# Patient Record
Sex: MALE | Race: WHITE | Employment: OTHER | URBAN - METROPOLITAN AREA
[De-identification: names, ages, dates, MRNs, and addresses within clinical notes are randomized per-mention and may not be internally consistent; named-entity substitution may affect disease eponyms.]

---

## 2017-02-22 ENCOUNTER — ANESTHESIA EVENT (OUTPATIENT)
Dept: ENDOSCOPY | Age: 62
End: 2017-02-22
Payer: COMMERCIAL

## 2017-02-22 ENCOUNTER — ANESTHESIA (OUTPATIENT)
Dept: ENDOSCOPY | Age: 62
End: 2017-02-22
Payer: COMMERCIAL

## 2017-02-22 ENCOUNTER — SURGERY (OUTPATIENT)
Age: 62
End: 2017-02-22

## 2017-02-22 PROCEDURE — 74011250636 HC RX REV CODE- 250/636

## 2017-02-22 PROCEDURE — 74011000250 HC RX REV CODE- 250

## 2017-02-22 RX ORDER — PROPOFOL 10 MG/ML
INJECTION, EMULSION INTRAVENOUS AS NEEDED
Status: DISCONTINUED | OUTPATIENT
Start: 2017-02-22 | End: 2017-02-22 | Stop reason: HOSPADM

## 2017-02-22 RX ORDER — SODIUM CHLORIDE 9 MG/ML
INJECTION, SOLUTION INTRAVENOUS
Status: DISCONTINUED | OUTPATIENT
Start: 2017-02-22 | End: 2017-02-22 | Stop reason: HOSPADM

## 2017-02-22 RX ORDER — LIDOCAINE HYDROCHLORIDE 20 MG/ML
INJECTION, SOLUTION EPIDURAL; INFILTRATION; INTRACAUDAL; PERINEURAL AS NEEDED
Status: DISCONTINUED | OUTPATIENT
Start: 2017-02-22 | End: 2017-02-22 | Stop reason: HOSPADM

## 2017-02-22 RX ADMIN — SODIUM CHLORIDE: 9 INJECTION, SOLUTION INTRAVENOUS at 12:15

## 2017-02-22 RX ADMIN — PROPOFOL 30 MG: 10 INJECTION, EMULSION INTRAVENOUS at 12:42

## 2017-02-22 RX ADMIN — PROPOFOL 20 MG: 10 INJECTION, EMULSION INTRAVENOUS at 12:41

## 2017-02-22 RX ADMIN — PROPOFOL 50 MG: 10 INJECTION, EMULSION INTRAVENOUS at 12:34

## 2017-02-22 RX ADMIN — PROPOFOL 50 MG: 10 INJECTION, EMULSION INTRAVENOUS at 12:33

## 2017-02-22 RX ADMIN — PROPOFOL 20 MG: 10 INJECTION, EMULSION INTRAVENOUS at 12:45

## 2017-02-22 RX ADMIN — LIDOCAINE HYDROCHLORIDE 40 MG: 20 INJECTION, SOLUTION EPIDURAL; INFILTRATION; INTRACAUDAL; PERINEURAL at 12:32

## 2017-02-22 RX ADMIN — PROPOFOL 30 MG: 10 INJECTION, EMULSION INTRAVENOUS at 12:44

## 2017-02-22 RX ADMIN — PROPOFOL 30 MG: 10 INJECTION, EMULSION INTRAVENOUS at 12:38

## 2017-02-22 RX ADMIN — PROPOFOL 20 MG: 10 INJECTION, EMULSION INTRAVENOUS at 12:39

## 2017-02-22 RX ADMIN — PROPOFOL 50 MG: 10 INJECTION, EMULSION INTRAVENOUS at 12:35

## 2017-02-22 NOTE — ANESTHESIA POSTPROCEDURE EVALUATION
Post-Anesthesia Evaluation and Assessment    Patient: Karina Black MRN: [id number]  SSN: [id number]    YOB: 1955  Age: 57 y.o. Sex: male       Cardiovascular Function/Vital Signs  Visit Vitals    /83    Pulse 68    Temp 36.8 °C (98.2 °F)    Resp 21    Ht 5' 10\" (1.778 m)    Wt 97.1 kg (214 lb)    SpO2 97%    BMI 30.71 kg/m2       Patient is status post MAC anesthesia for Procedure(s):  EGD WITH HALO  ENDOSCOPIC HALO ABLATION. Nausea/Vomiting: None    Postoperative hydration reviewed and adequate. Pain:  Pain Scale 1: Numeric (0 - 10) (02/22/17 1319)  Pain Intensity 1: 0 (02/22/17 1319)   Managed    Neurological Status: At baseline    Mental Status and Level of Consciousness: Arousable    Pulmonary Status:   O2 Device: Room air (02/22/17 1319)   Adequate oxygenation and airway patent    Complications related to anesthesia: None    Post-anesthesia assessment completed.  No concerns    Signed By: Eun Mcmillan MD     February 22, 2017

## 2017-02-22 NOTE — ANESTHESIA PREPROCEDURE EVALUATION
Anesthetic History   No history of anesthetic complications            Review of Systems / Medical History  Patient summary reviewed, nursing notes reviewed and pertinent labs reviewed    Pulmonary  Within defined limits                 Neuro/Psych   Within defined limits           Cardiovascular    Hypertension          Past MI and CAD      Comments: IMPRESSION  1. Moderate to severe left ventricular diastolic dysfunction. 2. Mild to moderate elevation of right-sided filling pressures. 3. Mild to moderate pulmonary hypertension (at least in part related to  left-sided disease). 4. No signs of left-to-right shunting by oximetry run.  5. Normal cardiac output. GI/Hepatic/Renal     GERD      Liver disease    Comments: St's  Esophageal Varies  Hiatal Hernia  cirrhosis Endo/Other    Diabetes: type 2    Arthritis     Other Findings   Comments: Hepatitis C  Alcoholic Cirrhosis  History of Thombocyopenia  Liver function improved per patient and he is not on the liver tx list anymore.          Physical Exam    Airway  Mallampati: II  TM Distance: 4 - 6 cm  Neck ROM: normal range of motion   Mouth opening: Normal     Cardiovascular    Rhythm: regular  Rate: normal         Dental  No notable dental hx       Pulmonary  Breath sounds clear to auscultation               Abdominal  Abdominal exam normal       Other Findings            Anesthetic Plan    ASA: 3  Anesthesia type: MAC          Induction: Intravenous  Anesthetic plan and risks discussed with: Patient

## 2017-03-01 ENCOUNTER — HOSPITAL ENCOUNTER (OUTPATIENT)
Dept: LAB | Age: 62
Discharge: HOME | End: 2017-03-01
Payer: COMMERCIAL

## 2017-03-01 ENCOUNTER — OFFICE VISIT (OUTPATIENT)
Dept: HEMATOLOGY | Age: 62
End: 2017-03-01

## 2017-03-01 VITALS
RESPIRATION RATE: 16 BRPM | OXYGEN SATURATION: 99 % | HEIGHT: 70 IN | DIASTOLIC BLOOD PRESSURE: 85 MMHG | HEART RATE: 68 BPM | TEMPERATURE: 96.8 F | SYSTOLIC BLOOD PRESSURE: 140 MMHG | WEIGHT: 214 LBS | BODY MASS INDEX: 30.64 KG/M2

## 2017-03-01 DIAGNOSIS — B19.20 COMPENSATED HCV CIRRHOSIS (HCC): Primary | ICD-10-CM

## 2017-03-01 DIAGNOSIS — K74.69 COMPENSATED HCV CIRRHOSIS (HCC): ICD-10-CM

## 2017-03-01 DIAGNOSIS — K74.69 COMPENSATED HCV CIRRHOSIS (HCC): Primary | ICD-10-CM

## 2017-03-01 DIAGNOSIS — B19.20 COMPENSATED HCV CIRRHOSIS (HCC): ICD-10-CM

## 2017-03-01 LAB
ALBUMIN SERPL BCP-MCNC: 3.6 G/DL (ref 3.4–5)
ALBUMIN/GLOB SERPL: 1 {RATIO} (ref 0.8–1.7)
ALP SERPL-CCNC: 139 U/L (ref 45–117)
ALT SERPL-CCNC: 33 U/L (ref 16–61)
ANION GAP BLD CALC-SCNC: 12 MMOL/L (ref 3–18)
AST SERPL W P-5'-P-CCNC: 34 U/L (ref 15–37)
BASOPHILS # BLD AUTO: 0 K/UL (ref 0–0.06)
BASOPHILS # BLD: 1 % (ref 0–2)
BILIRUB DIRECT SERPL-MCNC: 0.2 MG/DL (ref 0–0.2)
BILIRUB SERPL-MCNC: 0.5 MG/DL (ref 0.2–1)
BUN SERPL-MCNC: 21 MG/DL (ref 7–18)
BUN/CREAT SERPL: 18 (ref 12–20)
CALCIUM SERPL-MCNC: 8.9 MG/DL (ref 8.5–10.1)
CHLORIDE SERPL-SCNC: 109 MMOL/L (ref 100–108)
CO2 SERPL-SCNC: 24 MMOL/L (ref 21–32)
CREAT SERPL-MCNC: 1.19 MG/DL (ref 0.6–1.3)
DIFFERENTIAL METHOD BLD: ABNORMAL
EOSINOPHIL # BLD: 0.3 K/UL (ref 0–0.4)
EOSINOPHIL NFR BLD: 10 % (ref 0–5)
ERYTHROCYTE [DISTWIDTH] IN BLOOD BY AUTOMATED COUNT: 14.8 % (ref 11.6–14.5)
GLOBULIN SER CALC-MCNC: 3.6 G/DL (ref 2–4)
GLUCOSE SERPL-MCNC: 93 MG/DL (ref 74–99)
HCT VFR BLD AUTO: 34.8 % (ref 36–48)
HGB BLD-MCNC: 10.9 G/DL (ref 13–16)
INR PPP: 1.3 (ref 0.8–1.2)
LYMPHOCYTES # BLD AUTO: 20 % (ref 21–52)
LYMPHOCYTES # BLD: 0.6 K/UL (ref 0.9–3.6)
MCH RBC QN AUTO: 26.8 PG (ref 24–34)
MCHC RBC AUTO-ENTMCNC: 31.3 G/DL (ref 31–37)
MCV RBC AUTO: 85.5 FL (ref 74–97)
MONOCYTES # BLD: 0.3 K/UL (ref 0.05–1.2)
MONOCYTES NFR BLD AUTO: 9 % (ref 3–10)
NEUTS SEG # BLD: 1.9 K/UL (ref 1.8–8)
NEUTS SEG NFR BLD AUTO: 60 % (ref 40–73)
PLATELET # BLD AUTO: 76 K/UL (ref 135–420)
PMV BLD AUTO: 12.1 FL (ref 9.2–11.8)
POTASSIUM SERPL-SCNC: 4.7 MMOL/L (ref 3.5–5.5)
PROT SERPL-MCNC: 7.2 G/DL (ref 6.4–8.2)
PROTHROMBIN TIME: 15.3 SEC (ref 11.5–15.2)
RBC # BLD AUTO: 4.07 M/UL (ref 4.7–5.5)
SODIUM SERPL-SCNC: 145 MMOL/L (ref 136–145)
WBC # BLD AUTO: 3.1 K/UL (ref 4.6–13.2)

## 2017-03-01 PROCEDURE — 36415 COLL VENOUS BLD VENIPUNCTURE: CPT | Performed by: NURSE PRACTITIONER

## 2017-03-01 PROCEDURE — 82107 ALPHA-FETOPROTEIN L3: CPT | Performed by: NURSE PRACTITIONER

## 2017-03-01 PROCEDURE — 80076 HEPATIC FUNCTION PANEL: CPT | Performed by: NURSE PRACTITIONER

## 2017-03-01 PROCEDURE — 85610 PROTHROMBIN TIME: CPT | Performed by: NURSE PRACTITIONER

## 2017-03-01 PROCEDURE — 85025 COMPLETE CBC W/AUTO DIFF WBC: CPT | Performed by: NURSE PRACTITIONER

## 2017-03-01 PROCEDURE — 80048 BASIC METABOLIC PNL TOTAL CA: CPT | Performed by: NURSE PRACTITIONER

## 2017-03-01 NOTE — MR AVS SNAPSHOT
Visit Information Date & Time Provider Department Dept. Phone Encounter #  
 3/1/2017 10:00 AM Negar [facility] NP [facility] of [address] [place] [address] [id number] Follow-up Instructions Return in about 3 months (around 6/1/2017), or if symptoms worsen or fail to improve. Upcoming Health Maintenance Date Due HEMOGLOBIN A1C Q6M 1955 FOOT EXAM Q1 12/28/1964 MICROALBUMIN Q1 12/28/1964 EYE EXAM RETINAL OR DILATED Q1 12/28/1964 Pneumococcal 19-64 Medium Risk (1 of 1 - PPSV23) 12/28/1973 DTaP/Tdap/Td series (1 - Tdap) 12/28/1975 FOBT Q 1 YEAR AGE 50-75 12/28/2004 LIPID PANEL Q1 3/28/2014 ZOSTER VACCINE AGE 60> 12/28/2014 INFLUENZA AGE 9 TO ADULT 8/1/2016 Allergies as of 3/1/2017  Review Complete On: 3/1/2017 By: Jeanette Ying NP No Known Allergies Current Immunizations  Never Reviewed No immunizations on file. Not reviewed this visit You Were Diagnosed With   
  
 Codes Comments Compensated HCV cirrhosis ([facility]    -  Primary ICD-10-CM: B19.20, E86.86 ICD-9-CM: 070.54, 571.5 Vitals BP  
  
  
  
  
  
 140/85 (BP 1 Location: Left arm, BP Patient Position: Sitting) Vitals History BMI and BSA Data Body Mass Index Body Surface Area 30.71 kg/m 2 2.19 m 2 Preferred Pharmacy Pharmacy Name Phone [id number] [place] [address] [phone] Your Updated Medication List  
  
   
This list is accurate as of: 3/1/17 10:26 AM.  Always use your most recent med list.  
  
  
  
  
 gabapentin 600 mg tablet Commonly known as:  NEURONTIN Take 600 mg by mouth as needed. Indications: NEUROPATHIC PAIN  
  
 glipiZIDE SR 10 mg CR tablet Commonly known as:  GLUCOTROL XL Take 10 mg by mouth daily. Indications: TYPE 2 DIABETES MELLITUS  
  
 LIPITOR 10 mg tablet Generic drug:  atorvastatin Take 10 mg by mouth every evening. metoprolol succinate 50 mg XL tablet Commonly known as:  TOPROL-XL Take 50 mg by mouth daily. Indications: HYPERTENSION  
  
 multivitamin tablet Commonly known as:  ONE A DAY Take 1 Tab by mouth daily. nitroglycerin 0.4 mg SL tablet Commonly known as:  NITROSTAT  
1 Tab by SubLINGual route every five (5) minutes as needed for Chest Pain. ONGLYZA 5 mg Tab tablet Generic drug:  sAXagliptin Take 5 mg by mouth daily. PRILOSEC PO Take 20 mg by mouth daily. Indications: GERD Follow-up Instructions Return in about 3 months (around 6/1/2017), or if symptoms worsen or fail to improve. To-Do List   
 03/01/2017 Lab:  AFP WITH AFP-L3%   
  
 03/01/2017 Lab:  CBC WITH AUTOMATED DIFF   
  
 03/01/2017 Lab:  HEPATIC FUNCTION PANEL   
  
 03/01/2017 Lab:  METABOLIC PANEL, BASIC   
  
 03/01/2017 Lab:  PROTHROMBIN TIME + INR   
  
 03/01/2017 Imaging:  [facility]! Dear Mamta Elizabeth: Thank you for requesting a [employer] account. Our records indicate that you already have an active [employer] account. You can access your account anytime at [email or web address] Did you know that you can access your hospital and ER discharge instructions at any time in [employer]? You can also review all of your test results from your hospital stay or ER visit. Additional Information If you have questions, please visit the Frequently Asked Questions section of the [employer] website at [email or web address] Remember, [employer] is NOT to be used for urgent needs. For medical emergencies, dial 911. Now available from your iPhone and Android! Please provide this summary of care documentation to your next provider. Your primary care clinician is listed as VENITA PARKS. If you have any questions after today's visit, please call [phone].

## 2017-03-01 NOTE — PROGRESS NOTES
[address] Dilia Vidal MD, Jose Jackson NP   [address], NP   Rishabh Sales NP        at [address], [address] [facility]     [address] [facility] [place] [facility].     [phone]     FAX: [phone]    at [address], [id number] [place] [address], [address]     [phone]     FAX: [phone]       Patient Care Team:  Hodan Bray MD as PCP - General (Family Practice)  Ksenia Jackson MD (Gastroenterology)  Chapincito Montiel MD (Cardiology)  [address], NP as Consulting Provider (Gastroenterology)  Kaykay Whitney MD as Consulting Provider (Orthopedic Surgery)        Problem List  Date Reviewed: 9/27/2016          Codes Class Noted    H/O alcohol abuse ICD-10-CM: Z87.898  ICD-9-CM: 305.03  Unknown    Overview Signed 4/27/2016  6:37 PM by Nathaly Polanco V     quit few months ago, particpates in [place] [address]             H/O tobacco use, presenting hazards to health ICD-10-CM: Z87.891  ICD-9-CM: V15.82  Unknown    Overview Signed 4/27/2016  6:37 PM by Nathaly Polanco V     quit few months ago             CAD (coronary artery disease), native coronary artery ICD-10-CM: I25.10  ICD-9-CM: 414.01  Unknown    Overview Addendum 1/3/2014  3:55 PM by Sahil Polanco V     p/mLAD 60% ((FFR 0.84). Otherwise mild coronary artery disease (march 2013).  LV EF 65% (echo feb 2013)             Hepatic encephalopathy (HCC) ICD-10-CM: K72.90  ICD-9-CM: 572.2  2/26/2013        CKD (chronic kidney disease) ICD-10-CM: N18.9  ICD-9-CM: 585.9  2/26/2013        Esophageal varices ([facility] ICD-10-CM: I85.00  ICD-9-CM: 456.1  2/26/2013        Cirrhosis ([facility] ICD-10-CM: K74.60  ICD-9-CM: 571.5  2/2/2013    Overview Signed 2/2/2013  5:22 PM by Lance Hartmann MD     Secondary to chronic HCV             Ascites ICD-10-CM: R18.8  ICD-9-CM: 789.59  2/2/2013        Hypertension ICD-10-CM: I10  ICD-9-CM: 401.9  5/23/2011        Ventral hernia, unspecified, without mention of obstruction or gangrene ICD-10-CM: K43.9  ICD-9-CM: 553.20  5/23/2011        Hyperlipidemia ICD-10-CM: E78.5  ICD-9-CM: 272.4  5/23/2011        Type II or unspecified type diabetes mellitus without mention of complication, not stated as uncontrolled ICD-10-CM: E11.9  ICD-9-CM: 250.00  5/23/2011        GERD (gastroesophageal reflux disease) ICD-10-CM: K21.9  ICD-9-CM: 530.81  5/23/2011        St's esophagus ICD-10-CM: K22.70  ICD-9-CM: 530.85  5/23/2011        Chronic hepatitis C ([facility] ICD-10-CM: B18.2  ICD-9-CM: 070.54  5/23/2011    Overview Signed 2/2/2013  5:24 PM by Jean Kitchen MD     Genotype 1  Peginterferon/ribavirin non-response as part of HALT-C clinical trial.    Peginterferon maintenance therapy during HALT-C clinical trial.   Conatus non-responder study. Discontinued secondary to joint pain. Peginterferon/Riba/Boceprevir. Treated for 12 weeks with significant anemia and thrombocytopenia. Non-responder. Thrombocytopenia, secondary to cirrhosis ICD-10-CM: D69.59  ICD-9-CM: 287.49  5/23/2011              Ottoniel Beard returns to the [place] [address] for monitoring of cirrhosis secondary to chronic HCV. The active problem list, all pertinent past medical history, medications, liver histology, endoscopic studies, radiologic findings and laboratory findings related to the liver disorder were reviewed with the patient. Ascites resolved. The patient has not developed hepatic encephalopathy. The patient has esophageal varices without bleeding. The patient has began, but has not completed liver transplant evaluation testing. The Thallium stress test demonstrated a fixed and 2 small reversible lesions. Cardiac catheterization did not indicate the need for stenting. Mr. Elva Rebollar was noted to have a nodule on his thyroid while being worked up for a pleural effusion. He had a biopsy that was negative for malignancy. We have discussed the liver transplant process and various centers he could go to for liver transplantation. He and his wife would like to be seen at [place], [place] if this becomes neccessary. Mr. Casey Arevalo completed 12 weeks of treatment with sofosbuvir and simeprevir in June 2014. He had a SVR one year post treatment. Since Mr. Vincent Carroll last office visit he has been feeling well. He denies any further alcohol use since January 2016. He states that he continues to participate in [place]. Dr. Kenyon Stockton has performed two HALO procedures for St esophagus since his last office visit. No varices were identified during these procedures. Mr. Casey Arevalo is overall feeling well since his last office visit. His lower extremity edema has completely resolved and he is currently not taking any diuretics. He deneis any shortness of breathe. He continues to struggle with arthralgias due to osteoarthritis. He does have difficulty completing daily activities due to arthritis. The patient has not experienced problems concentrating, swelling of the abdomen, swelling of the lower extremities, hematemesis, hematochezia. ALLERGIES:  No Known Allergies    Current Outpatient Prescriptions   Medication Sig    atorvastatin (LIPITOR) 10 mg tablet Take 10 mg by mouth every evening.  nitroglycerin (NITROSTAT) 0.4 mg SL tablet 1 Tab by SubLINGual route every five (5) minutes as needed for Chest Pain.  metoprolol succinate (TOPROL-XL) 50 mg XL tablet Take 50 mg by mouth daily. Indications: HYPERTENSION    gabapentin (NEURONTIN) 600 mg tablet Take 600 mg by mouth as needed. Indications: NEUROPATHIC PAIN    glipiZIDE SR (GLUCOTROL) 10 mg CR tablet Take 10 mg by mouth daily. Indications: TYPE 2 DIABETES MELLITUS    saxagliptin (ONGLYZA) 5 mg Tab tablet Take 5 mg by mouth daily.  multivitamin (ONE A DAY) tablet Take 1 Tab by mouth daily.       OMEPRAZOLE (PRILOSEC PO) Take 20 mg by mouth daily. Indications: GERD     No current facility-administered medications for this visit. SYSTEM REVIEW NOT RELATED TO LIVER DISEASE OR REVIEWED ABOVE:  Constitution systems: Negative for fever, chills, weight gain, weight loss. Eyes: Negative for visual changes. ENT: Negative for sore throat, painful swallowing. Respiratory: Negative for cough, hemoptysis, SOB. Cardiology: Negative for chest pain, palpitations. GI:  Negative for constipation or diarrhea. : Negative for urinary frequency, dysuria, hematuria, nocturia. Skin: Negative for rash. Hematology: Positive for easy bruising. Negative for blood clots. Musculo-skelatal: Positive for weakness. Neurologic: Negative for headaches, dizziness, vertigo, memory problems not related to HE. Psychology: Negative for anxiety, depression. FAMILY/SOCIAL HISTORY:  The patient is . The patient has 2 children. Stooped smoking 1/2016. History of heavy alcohol use. Last alcohol consumption was 1/2016. The patient retired in 2010. PHYSICAL EXAMINATION:    Visit Vitals    /85 (BP 1 Location: Left arm, BP Patient Position: Sitting)    Pulse 68    Temp 96.8 °F (36 °C) (Tympanic)    Resp 16    Ht 5' 10\" (1.778 m)    Wt 214 lb (97.1 kg)    SpO2 99%    BMI 30.71 kg/m2       PHYSICAL EXAMINATION:  VS: per nursing note  General: No acute distress. Eyes: Sclera anicteric. ENT: No oral lesions. Thyroid normal.  Nodes: No adenopathy. Skin: No spider angiomata. No jaundice. No palmar erythema. Respiratory: Lungs clear to auscultation. Cardiovascular: Regular heart rate. No murmurs. No JVD. Abdomen: Soft non-tender, liver size normal to percussion/palpation. Spleen not palpable. No obvious ascites. Extremities: No edema. No muscle wasting. No gross arthritic changes. Neurologic: Alert and oriented. Cranial nerves grossly intact. No asterixis.       [facility] [facility] [facility] Units 8/29/2016 3/10/2016 12/1/2015   WBC 4.6 - 13.2 K/uL 2.3 (L) 2.4 (L) 2.3 (L)   ANC 1.8 - 8.0 K/UL 1.4 (L) 1.3 (L) 1.3 (L)   HGB 13.0 - 16.0 g/dL 9.5 (L) 10.0 (L) 9.5 (L)    - 420 K/uL 63 (L) 55 (L) 42 (L)   INR 0.8 - 1.2   1.3 (H) 1.4 (H) 1.4 (H)   AST 15 - 37 U/L 38 (H) 31 55 (H)   ALT 16 - 61 U/L 20 20 34   Alk Phos 45 - 117 U/L 181 (H) 127 (H) 243 (H)   Bili, Total 0.2 - 1.0 MG/DL 0.7 0.8 0.8   Bili, Direct 0.0 - 0.2 MG/DL 0.2 0.5 (H) 0.5 (H)   Albumin 3.4 - 5.0 g/dL 3.7 2.3 (L) 2.6 (L)   BUN 7.0 - 18 MG/DL 19 (H) 20 (H) 19 (H)   Creat 0.6 - 1.3 MG/DL 1.19 1.41 (H) 1.27   Na 136 - 145 mmol/L 143 141 145   K 3.5 - 5.5 mmol/L 4.2 4.7 5.0   Cl 100 - 108 mmol/L 109 (H) 110 (H) 110 (H)   CO2 21 - 32 mmol/L 25 25 26   Glucose 74 - 99 mg/dL 91 165 (H) 118 (H)   Ammonia 27 - 102 ug/dL        Liver [facility] Latest Ref Rng & Units 8/31/2015 5/20/2015   WBC 4.6 - 13.2 K/uL 2.9 (L) 3.0 (L)   ANC 1.8 - 8.0 K/UL 1.8 1.6   HGB 13.0 - 16.0 g/dL 9.0 (L) 10.9 (L)    - 420 K/uL 59 (LL) 33 (LL)   INR 0.8 - 1.2   1.3 (H) 1.3 (H)   AST 15 - 37 U/L 65 (H) 76 (H)   ALT 16 - 61 U/L 32 32   Alk Phos 45 - 117 U/L 149 (H) 159 (H)   Bili, Total 0.2 - 1.0 MG/DL 0.9 1.3 (H)   Bili, Direct 0.0 - 0.2 MG/DL 0.50 (H) 0.63 (H)   Albumin 3.4 - 5.0 g/dL 3.3 (L) 3.2 (L)   BUN 7.0 - 18 MG/DL 25 24   Creat 0.6 - 1.3 MG/DL 1.36 (H) 1.26   Na 136 - 145 mmol/L 135 136   K 3.5 - 5.5 mmol/L 5.2 5.7 (H)   Cl 100 - 108 mmol/L 100 101   CO2 21 - 32 mmol/L 18 20   Glucose 74 - 99 mg/dL 79 84   Ammonia 27 - 102 ug/dL       SEROLOGY  2/2012. Anti-HCV negative, CMV IgG positive, EBV IgG positive,     LIVER HISTOLOGY  None available. ENDOSCOPIC PROCEDURES  11/2009:  EGD. Reported to demonstrate varices and Sts esophagus. 12/2010:  EGD. Grade I-II esophageal varices. 12/2010:  Colonoscopy - unremarkable. 3/2013. EGD by Dr Carmel Jo. Report requested. 05/27/2014:  EGD per MLS. Multiple medium esophageal varices. Six bands placed. Barrets esophagus (bx negative for malignancy). Repeat in 6 weeks. 09/2014: EGD per MLS. Small esophageal varices. Mild portal hypertensive gastropathy. No gastric varices. Polyp biopsied in the duodenal bulb found to be carcinoid by pathology. Large segment of Barretts esophagus. Biopsy technologically difficult due to bleeding. 02/2015:  EGD per Dr. Annika Jensen. Esophageal varices. Seven bands placed. Portal hypertensive gastropathy. St esophagus. 3/2015: EGD per Dr. Annika Jensen. St esophagus. Portal hypertensive gastropathy. Varices. 4/2015: EGD per Dr. Annika Jensen. Esophageal varices. Seven bands placed. Portal hypertensive gastropathy. St esophagus. Will continue EGDs every four months. Can not perform HALO procedure until varices completley obliterated. 9/2015:  EGD per Dr. Annika Jensen. Grade 1 varices. Four bands applied. Repeat in 6 months. If varices obliterated will perform Halo. 9/2016:  EGD per Dr. Mildred Rascon. Small varices in mid esophagus. Distal esophagus with several columns of St's as previously noted. Banding of this mucosa was attempted but bands would not apply. The mucosa was friable and started to bleed actively. 1:10:000 epinephrine was injected (5 ml) at various bleeding sites. This did not control the bleeding. Gold probe cautery was then applies to the bleeding sites and hemostasis was achieved. 11/2016:  EGD with HALO per Dr. Javier Womack. [place] colored mucosa was seen at 34 cm and extended upto 38 cm. No nodules or ulceration was seen. Scarring from previous band ligation of varices was seen. No varices were seen. Small sliding hiatal hernia was seen. Stomach: Streaky erythema was seen in antrum and was radiating towards the pylorus. No gastric varices were seen Duodenum: normal  2/2017:  EGD with HALO per Dr. Javier Womack. Repeat in 3 months.     RADIOLOGY  1/2009:  Ultrasound liver - echogenic consistent with cirrhosis, no liver mass lesions, no ascites. 11/2010:  Abdominal ultrasound - no focal liver lesions noted, no ascites. 03/2012:  Ultrasound of the liver. Echogenic consistent with chronic liver disease, cirrhosis. No liver mass lesions. No ascites. 09/2012: Ultrasound of the liver. Echogenic consistent with chronic liver disease, cirrhosis. No liver mass lesions. No ascites. 2/12:  Chest x-ray PA and Lateral:  Normal heart size without consolidation. Band of atelectasis or scarring noted in the retrosternal region. There is blunting on the right CP angle due to small effusion. 2/2013:  Ultrasound of liver. Echogenic consistent with cirrhosis. 2.5 x 1.7 lesion in left lobe. Appears larger than on previous US. No dilated bile ducts. No ascites. 2/2013. MRI abdomen. No contrast given. Changes consistent with cirrhosis. No liver mass lesions. No dilated bile ducts. No bile duct strictures. No ascites. 03/2013 - Triple phase CT of abdomen. Hypervascular mass in left lobe stable and consistent with hemangioma. Stable non enhancing mass in segment 4/5. Large right pleural effusion. 08/2013: Ultrasound of the liver. Echogenic consistent with cirrhosis. No ascites. No bile duct strictures. 4.5 x 1.9 x 2.1 hyperechoic circumscribed structure similar to to CT which indicated hemangioma. 02/2014: The previously documented hemangioma within the subcapsular left hepatic lobe  is not identified on this exam. Hyperechoic mass within segment V/IV is identified, shows some interval increase since comparison ultrasound (maximum diameter currently 3.2 cm, previously 2.5 cm) but prior MRI suggests this represents a regenerative nodule. Second hyperechoic masslike area is now seen at subcapsular anterior right hepatic lobe measuring 3 cm maximally. This lesion is not clearly identified on prior ultrasound or MRI. Regenerative nodule is certainly possible but the finding is nonspecific.  04/2014:  Abdominal MRI.  2 cm lesion in the right hepatic lobe demonstrating faint arterial enhancement and early washout, concerning for hepatocellular carcinoma. 10/2014: Dynamic CT of the abdomen. Cirrhosis of the liver and evidence for portal hypertension with hepatomegaly and varices. Probable hemangioma although now with slightly atypical appearance in the left lobe of the liver lateral segment is not significantly changed in the interval in size as far back as 2011. This is an atypical appearance and behavior for hepatocellular carcinoma. 3. Probable focal fibrosis or regenerative nodule in the interlobar fissure. 3/2015: Ultrasound of the liver. A relatively discrete nodule is present in the right lobe measuring 3.2 x 2.2 x 2.2 cm. This is visible in retrospect on the prior study of 2014 February and is not hypervascular or well-defined on the following CT and MR studies. 11/2015: Abdominal ultrasound: Nodule in right hepatic lobe unchanged from previous imaging. No new lesions. No ascites. 01/2016: Ultrasound of liver. Echogenic consistent with cirrhosis. No liver mass lesions. No dilated bile ducts. No ascites. 9/2016: Ultrasound of the liver. Heterogeneous echotexture and lobulated contour of the liver, consistent with reported cirrhosis. Echogenic right hepatic mass is grossly stable given slight differences in  technique/positioning. No new hepatic mass was seen. Additional previously seen left hepatic mass is  not visualized on today's exam.     LIVER TRANSPLANT EVALUATION TESTING  2/2013. Blood ETOH positive. 2/2013. TSH 2.8, T3 112, T4 free 2.0  2/103. PSA 0.5  2/103. TB Quantiferon negative  2/2013. Blood type O+  2/2013. ECHO. Normal wall motion. LVEF 65%. No valve abnormalities. Trivial TR.  2/2013. FEV1 108% of predicted, FEV1% 103% of predicted, DLCO **% of predicted. 2/2013. Lexicon stress test.  LVEF 74%. Fixed inferior defect with normal wall motion.  Small reversible defect at apex and in distal inferolateral apical wall.  02/2013:  DEXA scan - Osteopenia. ASSESSMENT AND PLAN:    1. Cirrhosis secondary to chronic HCV and ETOH abuse. The liver function is depressed. The platelet count is depressed. CBC, BMP, hepatic panel, PT/INR ordered today. 2. Chronic HCV, genotype 1. He completed twelve weeks of treatment with simeprevir and sofosbuvir in June 2014. He had a SVR one year post treatment. 3. Peripheral edema. Resolved. 4. Esophageal varicies without prior bleeding. He has had repeat EGDs with HALO procedure performed. No varices identified. 5. Hepatic encephalopathy has not developed to date. There is no need for treatment with lactulose and/or xifaxan at this time. 6. ETOH abuse. Continue AA. Will repeat ETOH at every office visit. ETOH level ordered today. 7. The patient had a postive thalium stress test with reversible ischemia. He has a cardiac catheterization. No significant ASCAD identified. 8.  Back pain. Improved. 9. Thrombocytopenia secondary to cirrhosis. There is no evidence of overt bleeding. There is no indication for platelet transfusions or pharmacologic treatment to increase the platelet count. 10. [address] screening. AFP and abdominal ultrasound ordered today. 11. All of the above issues were discussed with the patient. All questions were answered. The patient expressed a clear understanding of the above. [address] in 12 weeks. 15. Dr. Benja Story was available during this visit.        Negar Kumar NP  [facility] of [address] [place] [address], [address] [place] [address]   [phone]

## 2017-03-03 LAB
AFP L3 MFR SERPL: 8.4 % (ref 0–9.9)
AFP SERPL-MCNC: 10.5 NG/ML (ref 0–8)

## 2017-03-28 ENCOUNTER — TELEPHONE (OUTPATIENT)
Dept: HEMATOLOGY | Age: 62
End: 2017-03-28

## 2017-03-28 NOTE — TELEPHONE ENCOUNTER
Patient would like for you to give him a call. He have some questions about a Ultrasound that he is suppose to be having at [place].

## 2017-04-04 ENCOUNTER — HOSPITAL ENCOUNTER (OUTPATIENT)
Dept: ULTRASOUND IMAGING | Age: 62
Discharge: HOME | End: 2017-04-04
Attending: NURSE PRACTITIONER
Payer: COMMERCIAL

## 2017-04-04 DIAGNOSIS — B19.20 COMPENSATED HCV CIRRHOSIS (HCC): ICD-10-CM

## 2017-04-04 DIAGNOSIS — K74.69 COMPENSATED HCV CIRRHOSIS (HCC): ICD-10-CM

## 2017-04-04 PROCEDURE — 76705 ECHO EXAM OF ABDOMEN: CPT

## 2017-04-19 ENCOUNTER — OFFICE VISIT (OUTPATIENT)
Dept: CARDIOLOGY CLINIC | Age: 62
End: 2017-04-19

## 2017-04-19 VITALS
DIASTOLIC BLOOD PRESSURE: 80 MMHG | BODY MASS INDEX: 31.92 KG/M2 | HEART RATE: 62 BPM | HEIGHT: 70 IN | OXYGEN SATURATION: 98 % | SYSTOLIC BLOOD PRESSURE: 138 MMHG | WEIGHT: 223 LBS

## 2017-04-19 DIAGNOSIS — D69.6 THROMBOCYTOPENIA (HCC): ICD-10-CM

## 2017-04-19 DIAGNOSIS — I10 ESSENTIAL HYPERTENSION: ICD-10-CM

## 2017-04-19 DIAGNOSIS — I25.10 CORONARY ARTERY DISEASE INVOLVING NATIVE CORONARY ARTERY OF NATIVE HEART WITHOUT ANGINA PECTORIS: Primary | ICD-10-CM

## 2017-04-19 DIAGNOSIS — D72.819 LEUKOPENIA, UNSPECIFIED TYPE: ICD-10-CM

## 2017-04-19 DIAGNOSIS — E78.5 HYPERLIPIDEMIA, UNSPECIFIED HYPERLIPIDEMIA TYPE: ICD-10-CM

## 2017-04-19 RX ORDER — TERAZOSIN 5 MG/1
5 CAPSULE ORAL DAILY
COMMUNITY
Start: 2017-04-14

## 2017-04-19 NOTE — PROGRESS NOTES
1. Have you been to the ER, urgent care clinic since your last visit? Hospitalized since your last visit? No     2. Have you seen or consulted any other health care providers outside of the [facility] since your last visit? Include any pap smears or colon screening.  No

## 2017-04-24 RX ORDER — ATORVASTATIN CALCIUM 10 MG/1
TABLET, FILM COATED ORAL
Qty: 90 TAB | Refills: 2 | Status: SHIPPED | OUTPATIENT
Start: 2017-04-24 | End: 2017-05-18

## 2017-04-24 NOTE — PROGRESS NOTES
PATIENT NAME: Valencia Claude         57 y.o.      1955              DATE:4/19/2017    REASON FOR VISIT: Coronary artery disease    HISTORY OF PRESENT ILLNESS: 60% LAD lesion seen on cath in January 2014. Left ventricular ejection fraction was 65%. The patient has a history of hyperlipidemia. He also has a history of cirrhosis of the liver and has questions about the safety of taking a statin in the setting of liver disease. His lipids have apparently been well controlled. He is hypertensive. Blood pressures have been well controlled. Ideally he should be on low-dose aspirin. He has a history of thrombocytopenia and leukopenia and wonders about the safety of taking aspirin. Denies chest pain and other symptoms suggestive of angina. Denies MOLINA, PND, orthopnea. Denies palpitation, syncope, presyncope. Denies edema in the lower extremities. PAST MEDICAL HISTORY:   Past Medical History:  5/23/2011: St's esophagus  No date: CAD (coronary artery disease), native coronary*      Comment: p/mLAD 60% ((FFR 0.84). Otherwise mild                coronary artery disease. 5/23/2011: Chronic hepatitis C without mention of hepatic*  No date: Chronic pain      Comment: back  5/23/2011: GERD (gastroesophageal reflux disease)  No date: H/O alcohol abuse      Comment: quit few months ago, particpates in AA  No date: H/O tobacco use, presenting hazards to health      Comment: quit few months ago  02/19/2013: History of echocardiogram      Comment: EF 65%. No reg'l WMA. RVSP 25 mmHg.    02/19/2013: History of myocardial perfusion scan      Comment: Fixed inferior defect, likely artifact. Sm,                mild reversible apical defect concerning for                mild ischemia; could be apical thinning. Sm                reversible distal inferolateral apical wall. Sm distal inferolateral, apical, & inferoapical               defect possibly ischemia. EF 74%. No WMA. Neg EKG on pharm stress test.  5/23/2011: Hyperlipidemia  20yrs+- 1990's: Hypertension  No date: Liver disease      Comment: chronic hep c   -non detectable since 2013  No date: Liver disease      Comment: cirrhosis  No date: OA (osteoarthritis)  No date: Other ill-defined conditions      Comment: triglycerides  03/28/2013: S/P cardiac cath      Comment: p/mLAD 60% ((FFR 0.84). Otherwise mild                coronary artery disease. RA 9.  RV 48/14. PA                50/20. W 25. COCI:  6.5/3.2 (TD) and 6.8/3.2                (Ingrid). 5/23/2011: Thrombocytopenia, secondary to cirrhosis  2007: Type II or unspecified type diabetes mellitus *  5/23/2011: Ventral hernia, unspecified, without mention o*    PAST SURGICAL HISTORY:   Past Surgical History:  No date: HX CATARACT REMOVAL      Comment: bilateral  No date: HX COLECTOMY      Comment: diverticulitis  No date: HX COLOSTOMY      Comment: then reversable  December 2010: HX GI      Comment: endoscopy  No date: HX GI      Comment: bowel resection - one surgery as of 3/11/2015  3/28/2013: HX HEART CATHETERIZATION      Comment: no stents  No date: HX HERNIA REPAIR      Comment: 3x ventral hernia, 1 testicular  1985: HX ORTHOPAEDIC      Comment: right wrist fracture (old injury) - and right                carpal tunnel  08/2016: HX ORTHOPAEDIC Right      Comment: wrist surgery - fusion and second carpal                tunnel      SOCIAL HISTORY:  Social History    Marital status:              Spouse name:                       Years of education:                 Number of children:               Social History Main Topics    Smoking status: Former Smoker                                                                Packs/day: 0.50      Years: 25.00          Quit date: 1/19/2016    Smokeless status: Never Used                        Alcohol use: No              Drug use:  No              Sexual activity: Yes               Partners with: Female        ALLERGIES:   No Known Allergies     CURRENT MEDICATIONS:   Current Outpatient Prescriptions:  terazosin (HYTRIN) 5 mg capsule,   atorvastatin (LIPITOR) 10 mg tablet, Take 10 mg by mouth every evening. metoprolol succinate (TOPROL-XL) 50 mg XL tablet, Take 50 mg by mouth daily. Indications: HYPERTENSION  glipiZIDE SR (GLUCOTROL) 10 mg CR tablet, Take 10 mg by mouth two (2) times a day. Indications: type 2 diabetes mellitus  saxagliptin (ONGLYZA) 5 mg Tab tablet, Take 5 mg by mouth daily. multivitamin (ONE A DAY) tablet, Take 1 Tab by mouth daily. OMEPRAZOLE (PRILOSEC PO), Take 20 mg by mouth daily. Indications: GERD  nitroglycerin (NITROSTAT) 0.4 mg SL tablet, 1 Tab by SubLINGual route every five (5) minutes as needed for Chest Pain.  gabapentin (NEURONTIN) 600 mg tablet, Take 600 mg by mouth as needed. Indications: NEUROPATHIC PAIN    No current facility-administered medications for this visit. REVIEW of SYSTEMS:History obtained from chart review and the patient  General ROS: negative for - weight gain or weight loss  Hematological and Lymphatic ROS: negative for - bleeding problems  Respiratory ROS: no cough, shortness of breath, or wheezing  Cardiovascular ROS: Please see history of present illness  Gastrointestinal ROS: no abdominal pain, change in bowel habits, or black or bloody stools  Musculoskeletal ROS: negative     PHYSICAL EXAMINATION:   /80  Pulse 62  Ht 5' 10\" (1.778 m)  Wt 101.2 kg (223 lb)  SpO2 98%  BMI 32 kg/m2  BP Readings from Last 3 Encounters:  04/19/17 : 138/80  03/01/17 : 140/85  02/22/17 : 142/83    Pulse Readings from Last 3 Encounters:  04/19/17 : 62  03/01/17 : 68  02/22/17 : 68    Wt Readings from Last 3 Encounters:  04/19/17 : 101.2 kg (223 lb)  03/01/17 : 97.1 kg (214 lb)  02/22/17 : 97.1 kg (214 lb)    General: Well-developed white male no apparent distress. HEENT: Sclera clear. Mucous membranes pink and moist.  Neck: No jugular venous distention. Carotid upstrokes 2+ without bruits. Chest: Clear to percussion and auscultation. Heart: PMI not palpable. Regular rhythm. No murmur or gallop. Abdomen: Nontender without masses or organomegaly. Extremities: No edema. Dorsalis pedis and posterior tibial pulses 2+. Skin: Warm and dry. No stasis changes. Neuro: Alert, oriented, speech WNL, no facial asymmetry. Gait WNL. EKG: Within normal limits    IMPRESSION:   Coronary artery disease, nonobstructive as of cath in January 2014. Asymptomatic  Hypertension, controlled  Hyperlipidemia, controlled  History of thrombocytopenia  History of cirrhosis of the liver    PLAN:  I have discussed statins with the patient. These are not contraindicated in patients with cirrhosis of the liver  As far as the low dose aspirin in the setting of thrombocytopenia is concerned, I have referred him to his hematologist oncologist regarding this question. My understanding is that aspirin is not contraindicated in this situation but I defer to the hematologist  No additional cardiac testing indicated at this time. No changes recommended    The diagnoses and plan were discussed with patient. All questions answered. Plan of care agreed to by all concerned. Rama Coleman MD       ,

## 2017-05-18 RX ORDER — LISINOPRIL 5 MG/1
20 TABLET ORAL DAILY
COMMUNITY
End: 2020-03-02 | Stop reason: DRUGHIGH

## 2017-05-19 ENCOUNTER — ANESTHESIA EVENT (OUTPATIENT)
Dept: ENDOSCOPY | Age: 62
End: 2017-05-19
Payer: COMMERCIAL

## 2017-05-19 ENCOUNTER — ANESTHESIA (OUTPATIENT)
Dept: ENDOSCOPY | Age: 62
End: 2017-05-19
Payer: COMMERCIAL

## 2017-05-19 ENCOUNTER — HOSPITAL ENCOUNTER (OUTPATIENT)
Age: 62
Setting detail: OUTPATIENT SURGERY
Discharge: HOME | End: 2017-05-19
Attending: INTERNAL MEDICINE | Admitting: INTERNAL MEDICINE
Payer: COMMERCIAL

## 2017-05-19 VITALS
HEART RATE: 62 BPM | BODY MASS INDEX: 30.49 KG/M2 | TEMPERATURE: 97.6 F | OXYGEN SATURATION: 100 % | SYSTOLIC BLOOD PRESSURE: 170 MMHG | WEIGHT: 213 LBS | HEIGHT: 70 IN | RESPIRATION RATE: 22 BRPM | DIASTOLIC BLOOD PRESSURE: 85 MMHG

## 2017-05-19 PROCEDURE — 77030036721 HC CATH BLN ABLAT ESOPH BARRX COVD -H1: Performed by: INTERNAL MEDICINE

## 2017-05-19 PROCEDURE — 77030025937 HC CAP SEAL HOLO BRMX -B: Performed by: INTERNAL MEDICINE

## 2017-05-19 PROCEDURE — 76040000019: Performed by: INTERNAL MEDICINE

## 2017-05-19 PROCEDURE — 74011250636 HC RX REV CODE- 250/636

## 2017-05-19 PROCEDURE — 74011000250 HC RX REV CODE- 250

## 2017-05-19 PROCEDURE — 76060000031 HC ANESTHESIA FIRST 0.5 HR: Performed by: INTERNAL MEDICINE

## 2017-05-19 RX ORDER — ATROPINE SULFATE 0.1 MG/ML
0.5 INJECTION INTRAVENOUS
Status: DISCONTINUED | OUTPATIENT
Start: 2017-05-19 | End: 2017-05-19 | Stop reason: HOSPADM

## 2017-05-19 RX ORDER — EPINEPHRINE 0.1 MG/ML
1 INJECTION INTRACARDIAC; INTRAVENOUS
Status: DISCONTINUED | OUTPATIENT
Start: 2017-05-19 | End: 2017-05-19 | Stop reason: HOSPADM

## 2017-05-19 RX ORDER — DEXTROMETHORPHAN/PSEUDOEPHED 2.5-7.5/.8
1.2 DROPS ORAL
Status: DISCONTINUED | OUTPATIENT
Start: 2017-05-19 | End: 2017-05-19 | Stop reason: HOSPADM

## 2017-05-19 RX ORDER — MIDAZOLAM HYDROCHLORIDE 1 MG/ML
.25-1 INJECTION, SOLUTION INTRAMUSCULAR; INTRAVENOUS
Status: DISCONTINUED | OUTPATIENT
Start: 2017-05-19 | End: 2017-05-19 | Stop reason: HOSPADM

## 2017-05-19 RX ORDER — PROPOFOL 10 MG/ML
INJECTION, EMULSION INTRAVENOUS AS NEEDED
Status: DISCONTINUED | OUTPATIENT
Start: 2017-05-19 | End: 2017-05-19 | Stop reason: HOSPADM

## 2017-05-19 RX ORDER — FENTANYL CITRATE 50 UG/ML
100 INJECTION, SOLUTION INTRAMUSCULAR; INTRAVENOUS
Status: DISCONTINUED | OUTPATIENT
Start: 2017-05-19 | End: 2017-05-19 | Stop reason: HOSPADM

## 2017-05-19 RX ORDER — SODIUM CHLORIDE 0.9 % (FLUSH) 0.9 %
5-10 SYRINGE (ML) INJECTION AS NEEDED
Status: DISCONTINUED | OUTPATIENT
Start: 2017-05-19 | End: 2017-05-19 | Stop reason: HOSPADM

## 2017-05-19 RX ORDER — SODIUM CHLORIDE 9 MG/ML
INJECTION, SOLUTION INTRAVENOUS
Status: DISCONTINUED | OUTPATIENT
Start: 2017-05-19 | End: 2017-05-19 | Stop reason: HOSPADM

## 2017-05-19 RX ORDER — SODIUM CHLORIDE 0.9 % (FLUSH) 0.9 %
5-10 SYRINGE (ML) INJECTION EVERY 8 HOURS
Status: DISCONTINUED | OUTPATIENT
Start: 2017-05-19 | End: 2017-05-19 | Stop reason: HOSPADM

## 2017-05-19 RX ORDER — SODIUM CHLORIDE 9 MG/ML
50 INJECTION, SOLUTION INTRAVENOUS CONTINUOUS
Status: DISCONTINUED | OUTPATIENT
Start: 2017-05-19 | End: 2017-05-19 | Stop reason: HOSPADM

## 2017-05-19 RX ORDER — LIDOCAINE HYDROCHLORIDE 20 MG/ML
INJECTION, SOLUTION EPIDURAL; INFILTRATION; INTRACAUDAL; PERINEURAL AS NEEDED
Status: DISCONTINUED | OUTPATIENT
Start: 2017-05-19 | End: 2017-05-19 | Stop reason: HOSPADM

## 2017-05-19 RX ORDER — NALOXONE HYDROCHLORIDE 0.4 MG/ML
0.4 INJECTION, SOLUTION INTRAMUSCULAR; INTRAVENOUS; SUBCUTANEOUS
Status: DISCONTINUED | OUTPATIENT
Start: 2017-05-19 | End: 2017-05-19 | Stop reason: HOSPADM

## 2017-05-19 RX ORDER — FLUMAZENIL 0.1 MG/ML
0.2 INJECTION INTRAVENOUS
Status: DISCONTINUED | OUTPATIENT
Start: 2017-05-19 | End: 2017-05-19 | Stop reason: HOSPADM

## 2017-05-19 RX ADMIN — PROPOFOL 40 MG: 10 INJECTION, EMULSION INTRAVENOUS at 10:59

## 2017-05-19 RX ADMIN — PROPOFOL 40 MG: 10 INJECTION, EMULSION INTRAVENOUS at 11:01

## 2017-05-19 RX ADMIN — PROPOFOL 40 MG: 10 INJECTION, EMULSION INTRAVENOUS at 10:55

## 2017-05-19 RX ADMIN — PROPOFOL 80 MG: 10 INJECTION, EMULSION INTRAVENOUS at 10:53

## 2017-05-19 RX ADMIN — PROPOFOL 40 MG: 10 INJECTION, EMULSION INTRAVENOUS at 11:11

## 2017-05-19 RX ADMIN — PROPOFOL 40 MG: 10 INJECTION, EMULSION INTRAVENOUS at 11:07

## 2017-05-19 RX ADMIN — PROPOFOL 40 MG: 10 INJECTION, EMULSION INTRAVENOUS at 10:57

## 2017-05-19 RX ADMIN — PROPOFOL 40 MG: 10 INJECTION, EMULSION INTRAVENOUS at 11:03

## 2017-05-19 RX ADMIN — LIDOCAINE HYDROCHLORIDE 30 MG: 20 INJECTION, SOLUTION EPIDURAL; INFILTRATION; INTRACAUDAL; PERINEURAL at 10:53

## 2017-05-19 RX ADMIN — PROPOFOL 40 MG: 10 INJECTION, EMULSION INTRAVENOUS at 11:09

## 2017-05-19 RX ADMIN — SODIUM CHLORIDE: 9 INJECTION, SOLUTION INTRAVENOUS at 10:32

## 2017-05-19 RX ADMIN — PROPOFOL 40 MG: 10 INJECTION, EMULSION INTRAVENOUS at 11:05

## 2017-05-19 NOTE — PROGRESS NOTES
Radha Lang  1955  [id number]    Situation:  Verbal report received from: GUANAKO ORTEGA RN  Procedure: Procedure(s):  EGD, HALO     Background:    Preoperative diagnosis: BARRETTS, HIATAL HERNIA  Postoperative diagnosis: 1.- Barretts Esophagus    :  Dr. Rommel Allen  Assistant(s): Endoscopy Technician-1: Branden Arthur  Endoscopy RN-1: Laith Calle RN    Specimens: * No specimens in log *  H. Pylori  no    Assessment:  Intra-procedure medications   Anesthesia gave intra-procedure sedation and medications, see anesthesia flow sheet yes    Intravenous fluids: NS@ KVO     Vital signs stable YES    Abdominal assessment: round and soft YES    Recommendation:  Discharge patient per MD order YES.   Return to floor N/A  Family or Friend YES  Permission to share finding with family or friend yes

## 2017-05-19 NOTE — ANESTHESIA POSTPROCEDURE EVALUATION
Post-Anesthesia Evaluation and Assessment    Patient: Brenda Duenas MRN: [id number]  SSN: [id number]    YOB: 1955  Age: 57 y.o. Sex: male       Cardiovascular Function/Vital Signs  Visit Vitals    /85    Pulse 62    Temp 36.4 °C (97.6 °F)    Resp 22    Ht 5' 10\" (1.778 m)    Wt 96.6 kg (213 lb)    SpO2 100%    BMI 30.56 kg/m2       Patient is status post MAC anesthesia for Procedure(s):  EGD, HALO . Nausea/Vomiting: None    Postoperative hydration reviewed and adequate. Pain:  Pain Scale 1: Numeric (0 - 10) (05/19/17 1139)  Pain Intensity 1: 0 (05/19/17 1139)   Managed    Neurological Status: At baseline    Mental Status and Level of Consciousness: Arousable    Pulmonary Status:   O2 Device: Room air (05/19/17 1139)   Adequate oxygenation and airway patent    Complications related to anesthesia: None    Post-anesthesia assessment completed.  No concerns    Signed By: Mikal Shrestha MD     May 19, 2017

## 2017-05-19 NOTE — H&P
[address]  [address] [facility] [address] [facility], [address]  [phone]                                History and Physical     NAME: Cruz Sousa   :  1955   MRN:  [id number]     HPI:  The patient was seen and examined. Past Surgical History:   Procedure Laterality Date    HX CATARACT REMOVAL      bilateral    HX COLECTOMY      diverticulitis    HX COLOSTOMY      then reversable    HX GI  2010    endoscopy    HX GI      bowel resection - one surgery as of 3/11/2015    HX GI      EGD with halo    HX HEART CATHETERIZATION  3/28/2013    no stents    HX HERNIA REPAIR      3x ventral hernia, 1 testicular    HX ORTHOPAEDIC  1985    right wrist fracture (old injury) - and right carpal tunnel    HX ORTHOPAEDIC Right 2016    wrist surgery - fusion and second carpal tunnel     Past Medical History:   Diagnosis Date    St's esophagus 2011    CAD (coronary artery disease), native coronary artery     p/mLAD 60% ((FFR 0.84). Otherwise mild coronary artery disease.  Chronic hepatitis C without mention of hepatic coma, genotype 1 2011    Chronic pain     back    GERD (gastroesophageal reflux disease) 2011    H/O alcohol abuse     quit few months ago, particpates in [place] [id number]    H/O tobacco use, presenting hazards to health     quit few months ago    History of echocardiogram 2013    EF 65%. No reg'l WMA. RVSP 25 mmHg.  History of myocardial perfusion scan 2013    Fixed inferior defect, likely artifact. Sm, mild reversible apical defect concerning for mild ischemia; could be apical thinning. Sm reversible distal inferolateral apical wall. Sm distal inferolateral, apical, & inferoapical defect possibly ischemia. EF 74%. No WMA.   Neg EKG on pharm stress test.    Hyperlipidemia 2011    Hypertension 20yrs+- 's    Liver disease     chronic hep c   -non detectable since     Liver disease     cirrhosis    OA (osteoarthritis)     Other ill-defined conditions     triglycerides    S/P cardiac cath 03/28/2013    p/mLAD 60% ((FFR 0.84). Otherwise mild coronary artery disease. RA 9.  RV 48/14. PA 50/20. W 25. COCI:  6.5/3.2 (TD) and 6.8/3.2 (Ingrid).       Thrombocytopenia, secondary to cirrhosis 5/23/2011    Type II or unspecified type diabetes mellitus without mention of complication, not stated as uncontrolled 2007    Ventral hernia, unspecified, without mention of obstruction or gangrene 5/23/2011     Social History   Substance Use Topics    Smoking status: Former Smoker     Packs/day: 0.50     Years: 25.00     Quit date: 1/19/2016    Smokeless tobacco: Never Used    Alcohol use No     No Known Allergies  Family History   Problem Relation Age of Onset    Diabetes Mother     Hypertension Mother     Other Mother      Gout    Heart Failure Mother     Cancer Father      carcinoma left lung    Diabetes Sister     Heart Failure Sister     HIV/AIDS Brother      Current Facility-Administered Medications   Medication Dose Route Frequency    0.9% sodium chloride infusion  50 mL/hr IntraVENous CONTINUOUS    sodium chloride (NS) flush 5-10 mL  5-10 mL IntraVENous Q8H    sodium chloride (NS) flush 5-10 mL  5-10 mL IntraVENous PRN    midazolam (VERSED) injection 0.25-10 mg  0.25-10 mg IntraVENous Multiple    fentaNYL citrate (PF) injection 100 mcg  100 mcg IntraVENous MULTIPLE DOSE GIVEN    naloxone (NARCAN) injection 0.4 mg  0.4 mg IntraVENous Multiple    flumazenil (ROMAZICON) 0.1 mg/mL injection 0.2 mg  0.2 mg IntraVENous Multiple    simethicone (MYLICON) 20VO/8.8YJ oral drops 80 mg  1.2 mL Oral Multiple    atropine injection 0.5 mg  0.5 mg IntraVENous ONCE PRN    EPINEPHrine (ADRENALIN) 0.1 mg/mL syringe 1 mg  1 mg Endoscopically ONCE PRN     Facility-Administered Medications Ordered in Other Encounters   Medication Dose Route Frequency    0.9% sodium chloride infusion   IntraVENous CONTINUOUS PHYSICAL EXAM:  General: WD, WN. Alert, cooperative, no acute distress    HEENT: NC, Atraumatic. PERRLA, EOMI. Anicteric sclerae. Lungs:  CTA Bilaterally. No Wheezing/Rhonchi/Rales. Heart:  Regular  rhythm,  No murmur, No Rubs, No Gallops  Abdomen: Soft, Non distended, Non tender.  +Bowel sounds, no HSM  Extremities: No c/c/e  Neurologic:  CN 2-12 gi, Alert and oriented X 3. No acute neurological distress   Psych:   Good insight. Not anxious nor agitated. The heart, lungs and mental status were satisfactory for the administration of MAC sedation and for the procedure.       Mallampati score: 3       Assessment:   · St's esophagus    Plan:   · Endoscopic procedure  · MAC sedation   ·

## 2017-05-19 NOTE — DISCHARGE INSTRUCTIONS
[address]  [address] [facility]  [phone]                     Discharge Instructions after Ablation of St's Esophagus    You have undergone an upper Endoscopy with ablation of St's esophagus. You may experience one or more of the following symptoms after treatment: chest discomfort, sore throat, difficulty or pain when swallowing and /or nausea/vomiting. These symptoms should improve with each day. You will be provided with several medications and specific instructions (below) to make you as comfortable as possible during this time. Should any of your symptoms be more severe in nature or longer in duration then we have described, please contact your physician. It is important to continue a strict and long-term regimen of anti-secretory medication after this treatment, such as Nexium, Prevacid, or other similar medication. ·  Maximize anti-secretory regimen, per MD  Please DO NOT stop taking this   medicine. If you are unable to swallow the pill you may crush it if allowed   or contact your physician for a liquid form. ·  Antacid/lidocaine mixture by mouth as needed, per MD    ·  Liquid acetaminophen (Tylenol) with or without codeine by mouth as needed, per  MD    ·  Anti-emetic (anti-nausea) medication per rectum as needed, per MD    ·  Full liquid diet for 24 hours, and then advance to soft diet for 1 week. Avoid   extreme cold or hot beverage and food. Avoid aspirin or non-steroidal anti-inflammatory medications (motrin, advil, and   Ibuprofen)    ·  Contact your treating physician immediately for significant chest pain, difficulty   swallowing, fever, bleeding, abdominal pain, difficulty breathing, vomiting or   other warning signs provided by the physician, so that the physician may    complete the appropriate diagnostic work-up and/or interventions as needed to   avoid complications.     ·  If you seek care for a digestive issue from any healthcare personnel in the   next 6 months following this procedure, other than the treating physician,   the treating physician should be consulted before any treatment is    Initiated. St's Esophagus: Care Instructions  Your Care Instructions    The esophagus is the tube that connects the throat to the stomach. Food and liquids go through this tube. In St's esophagus, the cells that line the tube change. This is usually because of gastroesophageal reflux disease (GERD). GERD causes acid from your stomach to back up into the esophagus. When you have St's esophagus, you are slightly more likely to get cancer of the esophagus. So regular testing is important to watch for signs of this cancer. You can treat GERD to control your symptoms and feel better. Follow-up care is a key part of your treatment and safety. Be sure to make and go to all appointments, and call your doctor if you are having problems. It's also a good idea to know your test results and keep a list of the medicines you take. How can you care for yourself at home? · Take your medicines exactly as prescribed. Call your doctor if you think you are having a problem with your medicine. · If you take over-the-counter medicine, such as antacids or acid reducers, follow all instructions on the label. If you use these medicines often, talk with your doctor. Be careful when you take over-the-counter antacid medicines. Many of these medicines have aspirin in them. Read the label to make sure that you are not taking more than the recommended dose. Too much aspirin can be harmful. · Do not smoke or chew tobacco. Smoking can make GERD worse. If you need help quitting, talk to your doctor about stop-smoking programs and medicines. These can increase your chances of quitting for good. · Chocolate, mint, and alcohol can make GERD worse. They relax the valve between the esophagus and the stomach.   · Spicy foods, foods that have a lot of acid (like tomatoes and oranges), and coffee can make GERD symptoms worse in some people. If your symptoms are worse after you eat a certain food, you may want to stop eating that food to see if your symptoms get better. · Eat smaller meals, and more often. After eating, wait 2 to 3 hours before you lie down. · Raise the head of your bed 6 to 8 inches by putting blocks under the frame or a foam wedge under the head of the mattress. · Do not wear tight clothing around your midsection. · If you are overweight, lose weight. Even losing 5 to 10 pounds can help. When should you call for help? Call 911 if you have symptoms of a heart attack. These may include:  · Chest pain or pressure. · Sweating. · Shortness of breath. · Nausea or vomiting. · Pain, pressure, or a strange feeling in the back, neck, jaw, or upper belly or in one or both shoulders or arms. · Lightheadedness or sudden weakness. · A fast or irregular heartbeat. After you call 911, the  may tell you to chew 1 adult-strength or 2 to 4 low-dose aspirin. Wait for an ambulance. Do not try to drive yourself. Call your doctor now or seek immediate medical care if:  · You have new or different belly pain. · Your stools are black and look like tar, or they have streaks of blood. Watch closely for changes in your health, and be sure to contact your doctor if:  · Your symptoms get worse or are not improving as expected. · You have any pain or difficulty swallowing. · Food seems to catch in your throat or chest.  Where can you learn more? Go to http://fermin-ana maria.info/. Enter L146 in the search box to learn more about \"St's Esophagus: Care Instructions. \"  Current as of: November 16, 2016  Content Version: 11.2  © 1261-7481 [employer]. Care instructions adapted under license by [employer] (which disclaims liability or warranty for this information).  If you have questions about a medical condition or this instruction, always ask your healthcare professional. [place] [address] any warranty or liability for your use of this information.

## 2017-05-19 NOTE — PROCEDURES
[address] [facility].  [address] [facility] [address], [address]  [phone]                            NAME:  Radha Lang   :   1955   MRN:   [id number]     Date/Time:  2017 11:17 AM    Endoscopy with Circumferential Ablation of Esophageal Tissue Procedure Note    :  Camelia Vargas MD    Referring Provider:  Olvin Owen MD    Anethesia/Sedation:  MAC anesthesia    Baseline Patient History:  · Length of IM: 3 cm  · Presence of Dysplasia NO  · GERD Symptoms NO  · Anti-Secretory Therapy Yes  Pre-Op Diagnosis: St's esophagus  Post-Op Diagnosis: St's esophagus    Summary of Findings:   Esophagus: Kenefic colored mucosa was seen starting at 35 cm till 38 cm. This was non circumferential.   Stomach: normal  Duodenum: normal    Procedure Description:  The patient was positioned in the left lateral decubitus position and vital sign monitoring equipment connected in the usual manner. Intravenous sedation was administered. (Esophagoscopy or EGD) was performed with identification of the top of the intestinal metaplasia and the top of the gastric folds. (Is this a follow up exam? YES If yes, record the estimate of resolution from baseline 75%. ) The distance from the bite block to each of these anatomic landmarks was recorded, and the total length of intestinal metaplasia calculated from these measurements. These landmarks were located as follows:    Top of intestinal metaplasia: 35 cm  Top of the gastric folds: 38 cm    The St's esophagus tissue was irrigated with water. A guidewire was placed and the endoscope removed. A Barrx 360 Express RFA Balloon Catheter was introduced over the guidewire. The endoscope was introduced in a [id number] manner with the Balloon Catheter. The balloon electrode was positioned under direct visualization so that the proximal edge of the electrode was slightly above the top of the intestinal metaplasia.  The balloon was automatically inflated and energy applied at 230 W and 10 J/cm2. The displayed inner diameter measurement was recorded. The electrode was moved distally by 4cm. aligning the proximal edge of the electrode with the distal edge of the ablation zone. Inflation and ablation was repeated until the top of the gastric folds was reached. The Ablation Catheter was removed, cleaned and reintroduced. The ablation zone was cleaned of coagulative debris with irrigation and suction using the endoscope and a cleaning cap. The Ablation Catheter was positioned under direct visualization so that the proximal edge of the electrode was at the proximal edge of the ablation zone. Inflation, ablation, and repositioning were repeated as in the first set of treatments. The Ablation Catheter and guidewire were removed. (Esophagoscopy or EGD) confirmed complete ablation of all intestinal metaplasia.     Follow Up:   Follow up EGD with HALO in 3 months   Follow post HALO ablation guidelines

## 2017-05-19 NOTE — IP AVS SNAPSHOT
[address] 
[phone] Patient: Zohra Burkett MRN: [id number] :1955 You are allergic to the following No active allergies Recent Documentation Height Weight BMI Smoking Status 1.778 m 96.6 kg 30.56 kg/m2 Former Smoker Emergency Contacts Name Discharge Info Relation Home Work Mobile Heidy Waterman DISCHARGE CAREGIVER [3] Spouse [3] [phone] Sid Waterman  Spouse [3] [phone] About your hospitalization You were admitted on:  May 19, 2017 You last received care in the:  [facility] ENDOSCOPY You were discharged on:  May 19, 2017 Unit phone number:  [phone] Why you were hospitalized Your primary diagnosis was:  Not on File Providers Seen During Your Hospitalizations Provider Role Specialty Primary office phone Guanako Albert MD Attending Provider Gastroenterology [phone] Your Primary Care Physician (PCP) Primary Care Physician Office Phone Office Fax Vincent Mayer [phone] Follow-up Information None Current Discharge Medication List  
  
CONTINUE these medications which have NOT CHANGED Dose & Instructions Dispensing Information Comments Morning Noon Evening Bedtime  
 glipiZIDE SR 10 mg CR tablet Commonly known as:  GLUCOTROL XL Your last dose was: Your next dose is:    
   
   
 Dose:  10 mg Take 10 mg by mouth two (2) times a day. Indications: type 2 diabetes mellitus Refills:  0 LIPITOR 10 mg tablet Generic drug:  atorvastatin Your last dose was: Your next dose is:    
   
   
 Dose:  10 mg Take 10 mg by mouth every evening. Refills:  0  
     
   
   
   
  
 lisinopril 5 mg tablet Commonly known as:  Jodean Embs Your last dose was: Your next dose is:    
   
   
 Dose:  5 mg Take 5 mg by mouth daily. Refills:  0  
     
   
   
   
  
 metoprolol succinate 50 mg XL tablet Commonly known as:  TOPROL-XL Your last dose was: Your next dose is:    
   
   
 Dose:  50 mg Take 50 mg by mouth daily. Indications: HYPERTENSION Refills:  0  
     
   
   
   
  
 multivitamin tablet Commonly known as:  ONE A DAY Your last dose was: Your next dose is:    
   
   
 Dose:  1 Tab Take 1 Tab by mouth daily. Refills:  0 ONGLYZA 5 mg Tab tablet Generic drug:  sAXagliptin Your last dose was: Your next dose is:    
   
   
 Dose:  5 mg Take 5 mg by mouth daily. Refills:  0 PRILOSEC PO Your last dose was: Your next dose is:    
   
   
 Dose:  20 mg Take 20 mg by mouth daily. Indications: GERD Refills:  0  
     
   
   
   
  
 terazosin 5 mg capsule Commonly known as:  HYTRIN Your last dose was: Your next dose is:    
   
   
 Dose:  5 mg Take 5 mg by mouth daily. Refills:  0 Discharge Instructions [address] 
[address] [place] [address], [address] 
[phone] Discharge Instructions after Ablation of St's Esophagus You have undergone an upper Endoscopy with ablation of St's esophagus. You may experience one or more of the following symptoms after treatment: chest discomfort, sore throat, difficulty or pain when swallowing and /or nausea/vomiting. These symptoms should improve with each day. You will be provided with several medications and specific instructions (below) to make you as comfortable as possible during this time. Should any of your symptoms be more severe in nature or longer in duration then we have described, please contact your physician.   It is important to continue a strict and long-term regimen of anti-secretory medication after this treatment, such as Nexium, Prevacid, or other similar medication. ·  Maximize anti-secretory regimen, per MD  Please DO NOT stop taking this   medicine. If you are unable to swallow the pill you may crush it if allowed   or contact your physician for a liquid form. ·  Antacid/lidocaine mixture by mouth as needed, per MD 
 
·  Liquid acetaminophen (Tylenol) with or without codeine by mouth as needed, per  MD 
 
·  Anti-emetic (anti-nausea) medication per rectum as needed, per MD 
 
·  Full liquid diet for 24 hours, and then advance to soft diet for 1 week. Avoid   extreme cold or hot beverage and food. Avoid aspirin or non-steroidal anti-inflammatory medications (motrin, advil, and   Ibuprofen) ·  Contact your treating physician immediately for significant chest pain, difficulty   swallowing, fever, bleeding, abdominal pain, difficulty breathing, vomiting or   other warning signs provided by the physician, so that the physician may    complete the appropriate diagnostic work-up and/or interventions as needed to   avoid complications. ·  If you seek care for a digestive issue from any healthcare personnel in the   next 6 months following this procedure, other than the treating physician,   the treating physician should be consulted before any treatment is    Initiated. St's Esophagus: Care Instructions Your Care Instructions The esophagus is the tube that connects the throat to the stomach. Food and liquids go through this tube. In St's esophagus, the cells that line the tube change. This is usually because of gastroesophageal reflux disease (GERD). GERD causes acid from your stomach to back up into the esophagus. When you have St's esophagus, you are slightly more likely to get cancer of the esophagus. So regular testing is important to watch for signs of this cancer. You can treat GERD to control your symptoms and feel better. Follow-up care is a key part of your treatment and safety. Be sure to make and go to all appointments, and call your doctor if you are having problems. It's also a good idea to know your test results and keep a list of the medicines you take. How can you care for yourself at home? · Take your medicines exactly as prescribed. Call your doctor if you think you are having a problem with your medicine. · If you take over-the-counter medicine, such as antacids or acid reducers, follow all instructions on the label. If you use these medicines often, talk with your doctor. Be careful when you take over-the-counter antacid medicines. Many of these medicines have aspirin in them. Read the label to make sure that you are not taking more than the recommended dose. Too much aspirin can be harmful. · Do not smoke or chew tobacco. Smoking can make GERD worse. If you need help quitting, talk to your doctor about stop-smoking programs and medicines. These can increase your chances of quitting for good. · Chocolate, mint, and alcohol can make GERD worse. They relax the valve between the esophagus and the stomach. · Spicy foods, foods that have a lot of acid (like tomatoes and oranges), and coffee can make GERD symptoms worse in some people. If your symptoms are worse after you eat a certain food, you may want to stop eating that food to see if your symptoms get better. · Eat smaller meals, and more often. After eating, wait 2 to 3 hours before you lie down. · Raise the head of your bed 6 to 8 inches by putting blocks under the frame or a foam wedge under the head of the mattress. · Do not wear tight clothing around your midsection. · If you are overweight, lose weight. Even losing 5 to 10 pounds can help. When should you call for help? Call 911 if you have symptoms of a heart attack. These may include: · Chest pain or pressure. · Sweating. · Shortness of breath. · Nausea or vomiting. · Pain, pressure, or a strange feeling in the back, neck, jaw, or upper belly or in one or both shoulders or arms. · Lightheadedness or sudden weakness. · A fast or irregular heartbeat. After you call 911, the  may tell you to chew 1 adult-strength or 2 to 4 low-dose aspirin. Wait for an ambulance. Do not try to drive yourself. Call your doctor now or seek immediate medical care if: 
· You have new or different belly pain. · Your stools are black and look like tar, or they have streaks of blood. Watch closely for changes in your health, and be sure to contact your doctor if: 
· Your symptoms get worse or are not improving as expected. · You have any pain or difficulty swallowing. · Food seems to catch in your throat or chest. 
Where can you learn more? Go to http://fermin-ana maria.info/. Enter L146 in the search box to learn more about \"St's Esophagus: Care Instructions. \" Current as of: November 16, 2016 Content Version: 11.2 © 2662-6534 [employer]. Care instructions adapted under license by [employer] (which disclaims liability or warranty for this information). If you have questions about a medical condition or this instruction, always ask your healthcare professional. [address] any warranty or liability for your use of this information. Discharge Orders None Introducing [facility]! Dear Slick Trevino: Thank you for requesting a [employer] account. Our records indicate that you already have an active [employer] account. You can access your account anytime at [email or web address] Did you know that you can access your hospital and ER discharge instructions at any time in [employer]? You can also review all of your test results from your hospital stay or ER visit. Additional Information If you have questions, please visit the Frequently Asked Questions section of the MyChart website at [email or web address] Remember, MyChart is NOT to be used for urgent needs. For medical emergencies, dial 911. Now available from your iPhone and Android! General Information Please provide this summary of care documentation to your next provider. Patient Signature:  ____________________________________________________________ Date:  ____________________________________________________________  
  
[facility] Provider Signature:  ____________________________________________________________ Date:  ____________________________________________________________

## 2017-08-23 ENCOUNTER — HOSPITAL ENCOUNTER (OUTPATIENT)
Age: 62
Setting detail: OUTPATIENT SURGERY
Discharge: HOME | End: 2017-08-23
Attending: INTERNAL MEDICINE | Admitting: INTERNAL MEDICINE
Payer: COMMERCIAL

## 2017-08-23 ENCOUNTER — ANESTHESIA EVENT (OUTPATIENT)
Dept: ENDOSCOPY | Age: 62
End: 2017-08-23
Payer: COMMERCIAL

## 2017-08-23 ENCOUNTER — ANESTHESIA (OUTPATIENT)
Dept: ENDOSCOPY | Age: 62
End: 2017-08-23
Payer: COMMERCIAL

## 2017-08-23 VITALS
SYSTOLIC BLOOD PRESSURE: 143 MMHG | TEMPERATURE: 98.2 F | HEIGHT: 70 IN | HEART RATE: 61 BPM | BODY MASS INDEX: 31.64 KG/M2 | RESPIRATION RATE: 20 BRPM | WEIGHT: 221 LBS | OXYGEN SATURATION: 100 % | DIASTOLIC BLOOD PRESSURE: 90 MMHG

## 2017-08-23 LAB
GLUCOSE BLD STRIP.AUTO-MCNC: 119 MG/DL (ref 65–100)
SERVICE CMNT-IMP: ABNORMAL

## 2017-08-23 PROCEDURE — 77030025937 HC CAP SEAL HOLO BRMX -B: Performed by: INTERNAL MEDICINE

## 2017-08-23 PROCEDURE — 76040000019: Performed by: INTERNAL MEDICINE

## 2017-08-23 PROCEDURE — 82962 GLUCOSE BLOOD TEST: CPT

## 2017-08-23 PROCEDURE — 76060000031 HC ANESTHESIA FIRST 0.5 HR: Performed by: INTERNAL MEDICINE

## 2017-08-23 PROCEDURE — 74011250636 HC RX REV CODE- 250/636: Performed by: INTERNAL MEDICINE

## 2017-08-23 PROCEDURE — C1886 CATHETER, ABLATION: HCPCS | Performed by: INTERNAL MEDICINE

## 2017-08-23 PROCEDURE — 74011000250 HC RX REV CODE- 250

## 2017-08-23 PROCEDURE — 74011250636 HC RX REV CODE- 250/636

## 2017-08-23 RX ORDER — PROPOFOL 10 MG/ML
INJECTION, EMULSION INTRAVENOUS AS NEEDED
Status: DISCONTINUED | OUTPATIENT
Start: 2017-08-23 | End: 2017-08-23 | Stop reason: HOSPADM

## 2017-08-23 RX ORDER — SODIUM CHLORIDE 0.9 % (FLUSH) 0.9 %
5-10 SYRINGE (ML) INJECTION EVERY 8 HOURS
Status: DISCONTINUED | OUTPATIENT
Start: 2017-08-23 | End: 2017-08-23 | Stop reason: HOSPADM

## 2017-08-23 RX ORDER — LIDOCAINE HYDROCHLORIDE 20 MG/ML
INJECTION, SOLUTION EPIDURAL; INFILTRATION; INTRACAUDAL; PERINEURAL AS NEEDED
Status: DISCONTINUED | OUTPATIENT
Start: 2017-08-23 | End: 2017-08-23 | Stop reason: HOSPADM

## 2017-08-23 RX ORDER — SODIUM CHLORIDE 9 MG/ML
50 INJECTION, SOLUTION INTRAVENOUS CONTINUOUS
Status: DISCONTINUED | OUTPATIENT
Start: 2017-08-23 | End: 2017-08-23 | Stop reason: HOSPADM

## 2017-08-23 RX ORDER — SODIUM CHLORIDE 9 MG/ML
INJECTION, SOLUTION INTRAVENOUS
Status: DISCONTINUED | OUTPATIENT
Start: 2017-08-23 | End: 2017-08-23 | Stop reason: HOSPADM

## 2017-08-23 RX ORDER — FLUMAZENIL 0.1 MG/ML
0.2 INJECTION INTRAVENOUS
Status: DISCONTINUED | OUTPATIENT
Start: 2017-08-23 | End: 2017-08-23 | Stop reason: HOSPADM

## 2017-08-23 RX ORDER — SODIUM CHLORIDE 0.9 % (FLUSH) 0.9 %
5-10 SYRINGE (ML) INJECTION AS NEEDED
Status: DISCONTINUED | OUTPATIENT
Start: 2017-08-23 | End: 2017-08-23 | Stop reason: HOSPADM

## 2017-08-23 RX ORDER — EPINEPHRINE 0.1 MG/ML
1 INJECTION INTRACARDIAC; INTRAVENOUS
Status: DISCONTINUED | OUTPATIENT
Start: 2017-08-23 | End: 2017-08-23 | Stop reason: HOSPADM

## 2017-08-23 RX ORDER — DEXTROMETHORPHAN/PSEUDOEPHED 2.5-7.5/.8
1.2 DROPS ORAL
Status: DISCONTINUED | OUTPATIENT
Start: 2017-08-23 | End: 2017-08-23 | Stop reason: HOSPADM

## 2017-08-23 RX ORDER — MIDAZOLAM HYDROCHLORIDE 1 MG/ML
.25-1 INJECTION, SOLUTION INTRAMUSCULAR; INTRAVENOUS
Status: DISCONTINUED | OUTPATIENT
Start: 2017-08-23 | End: 2017-08-23 | Stop reason: HOSPADM

## 2017-08-23 RX ORDER — NALOXONE HYDROCHLORIDE 0.4 MG/ML
0.4 INJECTION, SOLUTION INTRAMUSCULAR; INTRAVENOUS; SUBCUTANEOUS
Status: DISCONTINUED | OUTPATIENT
Start: 2017-08-23 | End: 2017-08-23 | Stop reason: HOSPADM

## 2017-08-23 RX ORDER — FENTANYL CITRATE 50 UG/ML
100 INJECTION, SOLUTION INTRAMUSCULAR; INTRAVENOUS
Status: DISCONTINUED | OUTPATIENT
Start: 2017-08-23 | End: 2017-08-23 | Stop reason: HOSPADM

## 2017-08-23 RX ORDER — ATROPINE SULFATE 0.1 MG/ML
0.5 INJECTION INTRAVENOUS
Status: DISCONTINUED | OUTPATIENT
Start: 2017-08-23 | End: 2017-08-23 | Stop reason: HOSPADM

## 2017-08-23 RX ADMIN — PROPOFOL 40 MG: 10 INJECTION, EMULSION INTRAVENOUS at 12:07

## 2017-08-23 RX ADMIN — PROPOFOL 40 MG: 10 INJECTION, EMULSION INTRAVENOUS at 12:11

## 2017-08-23 RX ADMIN — PROPOFOL 40 MG: 10 INJECTION, EMULSION INTRAVENOUS at 12:10

## 2017-08-23 RX ADMIN — SODIUM CHLORIDE 50 ML/HR: 900 INJECTION, SOLUTION INTRAVENOUS at 11:42

## 2017-08-23 RX ADMIN — LIDOCAINE HYDROCHLORIDE 50 MG: 20 INJECTION, SOLUTION EPIDURAL; INFILTRATION; INTRACAUDAL; PERINEURAL at 12:00

## 2017-08-23 RX ADMIN — PROPOFOL 40 MG: 10 INJECTION, EMULSION INTRAVENOUS at 12:16

## 2017-08-23 RX ADMIN — PROPOFOL 80 MG: 10 INJECTION, EMULSION INTRAVENOUS at 12:00

## 2017-08-23 RX ADMIN — PROPOFOL 30 MG: 10 INJECTION, EMULSION INTRAVENOUS at 12:13

## 2017-08-23 RX ADMIN — PROPOFOL 40 MG: 10 INJECTION, EMULSION INTRAVENOUS at 12:02

## 2017-08-23 RX ADMIN — PROPOFOL 30 MG: 10 INJECTION, EMULSION INTRAVENOUS at 12:05

## 2017-08-23 RX ADMIN — SODIUM CHLORIDE: 9 INJECTION, SOLUTION INTRAVENOUS at 11:44

## 2017-08-23 NOTE — ANESTHESIA PREPROCEDURE EVALUATION
Anesthetic History   No history of anesthetic complications            Review of Systems / Medical History  Patient summary reviewed, nursing notes reviewed and pertinent labs reviewed    Pulmonary  Within defined limits                 Neuro/Psych   Within defined limits           Cardiovascular  Within defined limits  Hypertension          CAD         GI/Hepatic/Renal  Within defined limits         Liver disease     Endo/Other  Within defined limits  Diabetes         Other Findings              Physical Exam    Airway  Mallampati: II  TM Distance: > 6 cm  Neck ROM: normal range of motion   Mouth opening: Normal     Cardiovascular  Regular rate and rhythm,  S1 and S2 normal,  no murmur, click, rub, or gallop             Dental  No notable dental hx       Pulmonary  Breath sounds clear to auscultation               Abdominal  GI exam deferred       Other Findings            Anesthetic Plan    ASA: 3  Anesthesia type: MAC          Induction: Intravenous  Anesthetic plan and risks discussed with: Patient

## 2017-08-23 NOTE — H&P
[address] [facility] [address]  [address] [facility] 140 [facility], [address]  [phone]                                History and Physical     NAME: Cedrick Shirley   :  1955   MRN:  [id number]     HPI:  The patient was seen and examined. Past Surgical History:   Procedure Laterality Date    HX CATARACT REMOVAL      bilateral    HX COLECTOMY      diverticulitis    HX COLOSTOMY      then reversable    HX GI  2010    endoscopy    HX GI      bowel resection - one surgery as of 3/11/2015    HX GI      EGD with halo    HX HEART CATHETERIZATION  3/28/2013    no stents    HX HERNIA REPAIR      3x ventral hernia, 1 testicular    HX ORTHOPAEDIC  1985    right wrist fracture (old injury) - and right carpal tunnel    HX ORTHOPAEDIC Right 2016    wrist surgery - fusion and second carpal tunnel     Past Medical History:   Diagnosis Date    St's esophagus 2011    CAD (coronary artery disease), native coronary artery     p/mLAD 60% ((FFR 0.84). Otherwise mild coronary artery disease.  Chronic hepatitis C without mention of hepatic coma, genotype 1 2011    Chronic pain     back    GERD (gastroesophageal reflux disease) 2011    H/O alcohol abuse     quit few months ago, particpates in [place] [address]    H/O tobacco use, presenting hazards to health     quit few months ago    History of echocardiogram 2013    EF 65%. No reg'l WMA. RVSP 25 mmHg.  History of myocardial perfusion scan 2013    Fixed inferior defect, likely artifact. Sm, mild reversible apical defect concerning for mild ischemia; could be apical thinning. Sm reversible distal inferolateral apical wall. Sm distal inferolateral, apical, & inferoapical defect possibly ischemia. EF 74%. No WMA.   Neg EKG on pharm stress test.    Hyperlipidemia 2011    Hypertension 20yrs+- 1990's    Liver disease     chronic hep c   -non detectable since     Liver disease     cirrhosis    OA (osteoarthritis)     Other ill-defined conditions     triglycerides    S/P cardiac cath 03/28/2013    p/mLAD 60% ((FFR 0.84). Otherwise mild coronary artery disease. RA 9.  RV 48/14. PA 50/20. W 25. COCI:  6.5/3.2 (TD) and 6.8/3.2 (Ingrid).       Thrombocytopenia, secondary to cirrhosis 5/23/2011    Type II or unspecified type diabetes mellitus without mention of complication, not stated as uncontrolled 2007    Ventral hernia, unspecified, without mention of obstruction or gangrene 5/23/2011     Social History   Substance Use Topics    Smoking status: Former Smoker     Packs/day: 0.50     Years: 25.00     Quit date: 1/19/2016    Smokeless tobacco: Never Used    Alcohol use No     No Known Allergies  Family History   Problem Relation Age of Onset    Diabetes Mother     Hypertension Mother     Other Mother      Gout    Heart Failure Mother     Cancer Father      carcinoma left lung    Diabetes Sister     Heart Failure Sister     HIV/AIDS Brother      Current Facility-Administered Medications   Medication Dose Route Frequency    0.9% sodium chloride infusion  50 mL/hr IntraVENous CONTINUOUS    sodium chloride (NS) flush 5-10 mL  5-10 mL IntraVENous Q8H    sodium chloride (NS) flush 5-10 mL  5-10 mL IntraVENous PRN    midazolam (VERSED) injection 0.25-10 mg  0.25-10 mg IntraVENous Multiple    fentaNYL citrate (PF) injection 100 mcg  100 mcg IntraVENous MULTIPLE DOSE GIVEN    naloxone (NARCAN) injection 0.4 mg  0.4 mg IntraVENous Multiple    flumazenil (ROMAZICON) 0.1 mg/mL injection 0.2 mg  0.2 mg IntraVENous Multiple    simethicone (MYLICON) 96US/8.9IA oral drops 80 mg  1.2 mL Oral Multiple    atropine injection 0.5 mg  0.5 mg IntraVENous ONCE PRN    EPINEPHrine (ADRENALIN) 0.1 mg/mL syringe 1 mg  1 mg Endoscopically ONCE PRN     Facility-Administered Medications Ordered in Other Encounters   Medication Dose Route Frequency    0.9% sodium chloride infusion   IntraVENous CONTINUOUS PHYSICAL EXAM:  General: WD, WN. Alert, cooperative, no acute distress    HEENT: NC, Atraumatic. PERRLA, EOMI. Anicteric sclerae. Lungs:  CTA Bilaterally. No Wheezing/Rhonchi/Rales. Heart:  Regular  rhythm,  No murmur, No Rubs, No Gallops  Abdomen: Soft, Non distended, Non tender.  +Bowel sounds, no HSM  Extremities: No c/c/e  Neurologic:  CN 2-12 gi, Alert and oriented X 3. No acute neurological distress   Psych:   Good insight. Not anxious nor agitated. The heart, lungs and mental status were satisfactory for the administration of MAC sedation and for the procedure.       Mallampati score: 3       Assessment:   · St's     Plan:   · Endoscopic procedure  · MAC sedation   ·

## 2017-08-23 NOTE — ROUTINE PROCESS
Shellie [facility]  1955  [id number]    Situation:  Verbal report received from: RN Luna Kendrick  Procedure: Procedure(s):  EGD WITH HALO ABLATION  ESOPHAGOGASTRODUODENOSCOPY (EGD)    Background:    Preoperative diagnosis: BARRETTS  Postoperative diagnosis: Barrettt's Esophagus  Hiatal Hernia    :  Dr. Clementina Baer  Assistant(s): Endoscopy Technician-1: Geovany Cristina  Endoscopy RN-1: Eliseo Quiroz RN    Specimens: * No specimens in log *  H. Pylori  no    Assessment:  Intra-procedure medications       Anesthesia gave intra-procedure sedation and medications, see anesthesia flow sheet yes    Intravenous fluids:   400  NS @ KVO     Vital signs stable yes    Abdominal assessment: round and soft yes    Recommendation:  Discharge patient per MD order yes.   Return to floor no  Family or Friend : wife  Permission to share finding with family or friend yes

## 2017-08-23 NOTE — PERIOP NOTES

## 2017-08-23 NOTE — DISCHARGE INSTRUCTIONS
[address] [facility] [address]  [address] [facility] [address] [facility]  [phone]                     Discharge Instructions after Ablation of St's Esophagus    You have undergone an upper Endoscopy with ablation of Ts's esophagus. You may experience one or more of the following symptoms after treatment: chest discomfort, sore throat, difficulty or pain when swallowing and /or nausea/vomiting. These symptoms should improve with each day. You will be provided with several medications and specific instructions (below) to make you as comfortable as possible during this time. Should any of your symptoms be more severe in nature or longer in duration then we have described, please contact your physician. It is important to continue a strict and long-term regimen of anti-secretory medication after this treatment, such as Nexium, Prevacid, or other similar medication. ·  Maximize anti-secretory regimen, per MD  Please DO NOT stop taking this   medicine. If you are unable to swallow the pill you may crush it if allowed   or contact your physician for a liquid form. ·  Antacid/lidocaine mixture by mouth as needed, per MD    ·  Liquid acetaminophen (Tylenol) with or without codeine by mouth as needed, per  MD    ·  Anti-emetic (anti-nausea) medication per rectum as needed, per MD    ·  Full liquid diet for 24 hours, and then advance to soft diet for 1 week. Avoid   extreme cold or hot beverage and food. Avoid aspirin or non-steroidal anti-inflammatory medications (motrin, advil, and   Ibuprofen)    ·  Contact your treating physician immediately for significant chest pain, difficulty   swallowing, fever, bleeding, abdominal pain, difficulty breathing, vomiting or   other warning signs provided by the physician, so that the physician may    complete the appropriate diagnostic work-up and/or interventions as needed to   avoid complications.     ·  If you seek care for a digestive issue from any healthcare personnel in the   next 6 months following this procedure, other than the treating physician,   the treating physician should be consulted before any treatment is    Initiated.

## 2017-08-23 NOTE — ANESTHESIA POSTPROCEDURE EVALUATION
Post-Anesthesia Evaluation and Assessment    Patient: Jessika Rehman MRN: [id number]  SSN: [id number]    YOB: 1955  Age: 57 y.o. Sex: male       Cardiovascular Function/Vital Signs  Visit Vitals    /90    Pulse 61    Temp 36.8 °C (98.2 °F)    Resp 20    Ht 5' 10\" (1.778 m)    Wt 100.2 kg (221 lb)    SpO2 100%    BMI 31.71 kg/m2       Patient is status post MAC anesthesia for Procedure(s):  EGD WITH HALO ABLATION  ESOPHAGOGASTRODUODENOSCOPY (EGD). Nausea/Vomiting: None    Postoperative hydration reviewed and adequate. Pain:  Pain Scale 1: Visual (08/23/17 1245)  Pain Intensity 1: 0 (08/23/17 1245)   Managed    Neurological Status: At baseline    Mental Status and Level of Consciousness: Arousable    Pulmonary Status:   O2 Device: Room air (08/23/17 1240)   Adequate oxygenation and airway patent    Complications related to anesthesia: None    Post-anesthesia assessment completed.  No concerns    Signed By: Ganga Chappell MD     August 23, 2017

## 2017-08-23 NOTE — PROCEDURES
118 [facility].  217 [facility] [address]  [place], [address]  [phone]                            NAME:  Javier James   :   1955   MRN:   [id number]     Date/Time:  2017 12:43 PM    Endoscopy with Channel Ablation of Esophageal Tissue Procedure Note    :  Rebecca Harrison MD    Referring Provider:  Stanly Mcardle, MD    Anethesia/Sedation:  MAC anesthesia    Baseline Patient History:  · Length of IM: 2 cm  · Presence of Dysplasia NO  · GERD Symptoms NO  · Anti-Secretory Therapy Yes  Pre-Op Diagnosis: St's esophagus  Post-Op Diagnosis: St's esophagus    Summary of Findings:   Esophagus: Scarring from previous banding was seen in lower third of esophagus. Wink colored islands were seen at 35 to 37 cm. No nodule or ulceration was seen. This was about 70 % resolved. Stomach: Mucosal changes of GAVE were seen in antrum. Duodenum: normal        Procedure Description:  The patient was positioned in the left lateral decubitus position and vital sign monitoring equipment connected in the usual manner. Intravenous sedation was administered. (Esophagoscopy or EGD) was performed with identification of the top of the intestinal metaplasia and the top of the gastric folds. This a follow up exam and the estimate of resolution from baseline is about 70%. The distance from the bite block to each of these anatomic landmarks was recorded, and the total length of intestinal metaplasia calculated from these measurements. These landmarks were located as follows:    Top of intestinal metaplasia: 35 cm  Top of the gastric folds: 38 cm    NBI was used. The St's esophagus tissue was irrigated with water. The endoscope was introduced. The Ablation Catheter was introduced through the endoscope working channel. Sts tissue was targeted.  The endoscope with Ablation Catheter was positioned under direct visualization so that the Ablation Catheter was in contact with Sts tissue. Energy was applied twice at a power density of 48 W/cm2 and energy density of 12 J/cm2. The electrode was then moved to the next region of visible of Sts tissue. Ablation was repeated until all visible Sts tissue was ablated. The Ablation Catheter was removed through the endoscope working channel and cleaned. The endoscope was left in place. The ablation zone was cleaned of coagulative debris. The Ablation Catheter was then reintroduced. A second complete ablation set was applied as in the first treatment set. The Ablation Catheter was removed through the endoscope working channel. The ablated area was inspected. The endoscope was then removed. Follow Up:   EGD with HALO in 6 months.  Follow post ablation guidelines   Low salt diet   Follow up with Dr Hernan Newton as scheduled.

## 2017-08-23 NOTE — IP AVS SNAPSHOT
[address] [place] [address] 
[phone] Patient: Perez Duran MRN: [id number] :1955 You are allergic to the following No active allergies Recent Documentation Height Weight BMI Smoking Status 1.778 m 100.2 kg 31.71 kg/m2 Former Smoker Emergency Contacts Name Discharge Info Relation Home Work Mobile Heidy Waterman DISCHARGE CAREGIVER [3] Spouse [3] [phone] DAMIEN WatermanHeidy  Spouse [3] [phone] About your hospitalization You were admitted on:  2017 You last received care in the:  [facility] ENDOSCOPY You were discharged on:  2017 Unit phone number:  [phone] Why you were hospitalized Your primary diagnosis was:  Not on File Providers Seen During Your Hospitalizations Provider Role Specialty Primary office phone Juan Miguel Villalta MD Attending Provider Gastroenterology [phone] Your Primary Care Physician (PCP) Primary Care Physician Office Phone Office Fax Alba Monet [phone] Follow-up Information None Your Appointments   9:30 AM [place] [id number] [facility] ([address])  
 [place] [address]  
[phone] Current Discharge Medication List  
  
CONTINUE these medications which have NOT CHANGED Dose & Instructions Dispensing Information Comments Morning Noon Evening Bedtime  
 glipiZIDE SR 10 mg CR tablet Commonly known as:  GLUCOTROL XL Your last dose was: Your next dose is:    
   
   
 Dose:  10 mg Take 10 mg by mouth two (2) times a day. Indications: type 2 diabetes mellitus Refills:  0 LIPITOR 10 mg tablet Generic drug:  atorvastatin Your last dose was: Your next dose is:    
   
   
 Dose:  10 mg Take 10 mg by mouth every evening. Refills:  0  
     
   
   
   
  
 lisinopril 5 mg tablet Commonly known as:  [place] Your last dose was: Your next dose is:    
   
   
 Dose:  5 mg Take 5 mg by mouth daily. Refills:  0  
     
   
   
   
  
 metoprolol succinate 50 mg XL tablet Commonly known as:  TOPROL-XL Your last dose was: Your next dose is:    
   
   
 Dose:  50 mg Take 50 mg by mouth daily. Indications: HYPERTENSION Refills:  0  
     
   
   
   
  
 multivitamin tablet Commonly known as:  ONE A DAY Your last dose was: Your next dose is:    
   
   
 Dose:  1 Tab Take 1 Tab by mouth daily. Refills:  0 ONGLYZA 5 mg Tab tablet Generic drug:  sAXagliptin Your last dose was: Your next dose is:    
   
   
 Dose:  5 mg Take 5 mg by mouth daily. Refills:  0 PRILOSEC PO Your last dose was: Your next dose is:    
   
   
 Dose:  20 mg Take 20 mg by mouth daily. Indications: GERD Refills:  0  
     
   
   
   
  
 terazosin 5 mg capsule Commonly known as:  HYTRIN Your last dose was: Your next dose is:    
   
   
 Dose:  5 mg Take 5 mg by mouth daily. Refills:  0 Discharge Instructions [address] [place] [address]. 
[address] [place] [address],  [address]  
[phone] Discharge Instructions after Ablation of St's Esophagus You have undergone an upper Endoscopy with ablation of St's esophagus. You may experience one or more of the following symptoms after treatment: chest discomfort, sore throat, difficulty or pain when swallowing and /or nausea/vomiting. These symptoms should improve with each day.   You will be provided with several medications and specific instructions (below) to make you as comfortable as possible during this time. Should any of your symptoms be more severe in nature or longer in duration then we have described, please contact your physician. It is important to continue a strict and long-term regimen of anti-secretory medication after this treatment, such as Nexium, Prevacid, or other similar medication. ·  Maximize anti-secretory regimen, per MD  Please DO NOT stop taking this   medicine. If you are unable to swallow the pill you may crush it if allowed   or contact your physician for a liquid form. ·  Antacid/lidocaine mixture by mouth as needed, per MD 
 
·  Liquid acetaminophen (Tylenol) with or without codeine by mouth as needed, per  MD 
 
·  Anti-emetic (anti-nausea) medication per rectum as needed, per MD 
 
·  Full liquid diet for 24 hours, and then advance to soft diet for 1 week. Avoid   extreme cold or hot beverage and food. Avoid aspirin or non-steroidal anti-inflammatory medications (motrin, advil, and   Ibuprofen) ·  Contact your treating physician immediately for significant chest pain, difficulty   swallowing, fever, bleeding, abdominal pain, difficulty breathing, vomiting or   other warning signs provided by the physician, so that the physician may    complete the appropriate diagnostic work-up and/or interventions as needed to   avoid complications. ·  If you seek care for a digestive issue from any healthcare personnel in the   next 6 months following this procedure, other than the treating physician,   the treating physician should be consulted before any treatment is    Initiated. Discharge Orders None [facility] SERVICES! Dear Juan M Cover: Thank you for requesting a [employer] account. Our records indicate that you already have an active [employer] account. You can access your account anytime at [email or web address] Did you know that you can access your hospital and ER discharge instructions at any time in MyChart? You can also review all of your test results from your hospital stay or ER visit. Additional Information If you have questions, please visit the Frequently Asked Questions section of the [email or web address] website at [email or web address] Remember, MyChart is NOT to be used for urgent needs. For medical emergencies, dial 911. Now available from your iPhone and Android! General Information Please provide this summary of care documentation to your next provider. Patient Signature:  ____________________________________________________________ Date:  ____________________________________________________________  
  
Willam Cook Provider Signature:  ____________________________________________________________ Date:  ____________________________________________________________

## 2017-08-31 ENCOUNTER — OFFICE VISIT (OUTPATIENT)
Dept: HEMATOLOGY | Age: 62
End: 2017-08-31

## 2017-08-31 ENCOUNTER — HOSPITAL ENCOUNTER (OUTPATIENT)
Dept: LAB | Age: 62
Discharge: HOME | End: 2017-08-31
Payer: COMMERCIAL

## 2017-08-31 VITALS
WEIGHT: 218 LBS | HEART RATE: 66 BPM | RESPIRATION RATE: 16 BRPM | HEIGHT: 70 IN | DIASTOLIC BLOOD PRESSURE: 77 MMHG | BODY MASS INDEX: 31.21 KG/M2 | SYSTOLIC BLOOD PRESSURE: 120 MMHG | OXYGEN SATURATION: 96 % | TEMPERATURE: 97.4 F

## 2017-08-31 DIAGNOSIS — K70.30 ALCOHOLIC CIRRHOSIS OF LIVER WITHOUT ASCITES (HCC): ICD-10-CM

## 2017-08-31 DIAGNOSIS — K70.30 ALCOHOLIC CIRRHOSIS OF LIVER WITHOUT ASCITES (HCC): Primary | ICD-10-CM

## 2017-08-31 LAB
ALBUMIN SERPL-MCNC: 4 G/DL (ref 3.4–5)
ALBUMIN/GLOB SERPL: 1.1 {RATIO} (ref 0.8–1.7)
ALP SERPL-CCNC: 136 U/L (ref 45–117)
ALT SERPL-CCNC: 21 U/L (ref 16–61)
ANION GAP SERPL CALC-SCNC: 12 MMOL/L (ref 3–18)
AST SERPL-CCNC: 27 U/L (ref 15–37)
BASOPHILS # BLD: 0 K/UL (ref 0–0.06)
BASOPHILS NFR BLD: 1 % (ref 0–2)
BILIRUB DIRECT SERPL-MCNC: 0.2 MG/DL (ref 0–0.2)
BILIRUB SERPL-MCNC: 0.6 MG/DL (ref 0.2–1)
BUN SERPL-MCNC: 37 MG/DL (ref 7–18)
BUN/CREAT SERPL: 25 (ref 12–20)
CALCIUM SERPL-MCNC: 9.4 MG/DL (ref 8.5–10.1)
CHLORIDE SERPL-SCNC: 105 MMOL/L (ref 100–108)
CO2 SERPL-SCNC: 21 MMOL/L (ref 21–32)
CREAT SERPL-MCNC: 1.47 MG/DL (ref 0.6–1.3)
DIFFERENTIAL METHOD BLD: ABNORMAL
EOSINOPHIL # BLD: 0.1 K/UL (ref 0–0.4)
EOSINOPHIL NFR BLD: 4 % (ref 0–5)
ERYTHROCYTE [DISTWIDTH] IN BLOOD BY AUTOMATED COUNT: 15.6 % (ref 11.6–14.5)
GLOBULIN SER CALC-MCNC: 3.8 G/DL (ref 2–4)
GLUCOSE SERPL-MCNC: 128 MG/DL (ref 74–99)
HCT VFR BLD AUTO: 33.2 % (ref 36–48)
HGB BLD-MCNC: 10.9 G/DL (ref 13–16)
INR PPP: 1.3 (ref 0.8–1.2)
LYMPHOCYTES # BLD: 0.7 K/UL (ref 0.9–3.6)
LYMPHOCYTES NFR BLD: 19 % (ref 21–52)
MCH RBC QN AUTO: 27.7 PG (ref 24–34)
MCHC RBC AUTO-ENTMCNC: 32.8 G/DL (ref 31–37)
MCV RBC AUTO: 84.5 FL (ref 74–97)
MONOCYTES # BLD: 0.3 K/UL (ref 0.05–1.2)
MONOCYTES NFR BLD: 10 % (ref 3–10)
NEUTS SEG # BLD: 2.2 K/UL (ref 1.8–8)
NEUTS SEG NFR BLD: 66 % (ref 40–73)
PLATELET # BLD AUTO: 70 K/UL (ref 135–420)
PMV BLD AUTO: 11.5 FL (ref 9.2–11.8)
POTASSIUM SERPL-SCNC: 5.1 MMOL/L (ref 3.5–5.5)
PROT SERPL-MCNC: 7.8 G/DL (ref 6.4–8.2)
PROTHROMBIN TIME: 15.6 SEC (ref 11.5–15.2)
RBC # BLD AUTO: 3.93 M/UL (ref 4.7–5.5)
SODIUM SERPL-SCNC: 138 MMOL/L (ref 136–145)
WBC # BLD AUTO: 3.4 K/UL (ref 4.6–13.2)

## 2017-08-31 PROCEDURE — 36415 COLL VENOUS BLD VENIPUNCTURE: CPT | Performed by: NURSE PRACTITIONER

## 2017-08-31 PROCEDURE — 85610 PROTHROMBIN TIME: CPT | Performed by: NURSE PRACTITIONER

## 2017-08-31 PROCEDURE — 80076 HEPATIC FUNCTION PANEL: CPT | Performed by: NURSE PRACTITIONER

## 2017-08-31 PROCEDURE — 85025 COMPLETE CBC W/AUTO DIFF WBC: CPT | Performed by: NURSE PRACTITIONER

## 2017-08-31 PROCEDURE — 80048 BASIC METABOLIC PNL TOTAL CA: CPT | Performed by: NURSE PRACTITIONER

## 2017-08-31 PROCEDURE — 82107 ALPHA-FETOPROTEIN L3: CPT | Performed by: NURSE PRACTITIONER

## 2017-08-31 NOTE — PROGRESS NOTES
Carole Durán is a 57 y.o. male    No chief complaint on file. 1. Have you been to the ER, urgent care clinic or hospitalized since your last visit? NO.     2. Have you seen or consulted any other health care providers outside of the [address] [place] [address] since your last visit (Inclogde any pap smears or colon screening)?  NO  Learning Assessment 4/27/2016   PRIMARY LEARNER Patient   HIGHEST LEVEL OF EDUCATION - PRIMARY LEARNER  -   BARRIERS PRIMARY LEARNER -   CO-LEARNER CAREGIVER -   PRIMARY LANGUAGE ENGLISH   LEARNER PREFERENCE PRIMARY DEMONSTRATION     LISTENING     READING   LEARNING SPECIAL TOPICS -   ANSWERED BY patient   RELATIONSHIP SELF   lisy for pcp and endocrinology  and tom for cortisone shot

## 2017-08-31 NOTE — MR AVS SNAPSHOT
Visit Information Date & Time Provider Department Dept. Phone Encounter #  
 8/31/2017  9:30 AM GAIL [facility]yes [address] [id number] Follow-up Instructions Return in about 3 months (around 11/30/2017). Upcoming Health Maintenance Date Due HEMOGLOBIN A1C Q6M 1955 FOOT EXAM Q1 12/28/1964 MICROALBUMIN Q1 12/28/1964 EYE EXAM RETINAL OR DILATED Q1 12/28/1964 Pneumococcal 19-64 Highest Risk (1 of 3 - PCV13) 12/28/1973 DTaP/Tdap/Td series (1 - Tdap) 12/28/1975 FOBT Q 1 YEAR AGE 50-75 12/28/2004 LIPID PANEL Q1 3/28/2014 ZOSTER VACCINE AGE 60> 10/28/2014 INFLUENZA AGE 9 TO ADULT 8/1/2017 Allergies as of 8/31/2017  Review Complete On: 8/31/2017 By: Shanika Severino No Known Allergies Current Immunizations  Never Reviewed No immunizations on file. Not reviewed this visit You Were Diagnosed With   
  
 Codes Comments Alcoholic cirrhosis of liver without ascites ([facility].)    -  Primary ICD-10-CM: K70.30 ICD-9-CM: 571.2 Vitals BP Pulse Temp Resp Height(growth percentile) 120/77 (BP 1 Location: Right arm, BP Patient Position: Sitting) 66 97.4 °F (36.3 °C) (Tympanic) 16 5' 10\" (1.778 m) Weight(growth percentile) SpO2 BMI Smoking Status 218 lb (98.9 kg) 96% 31.28 kg/m2 Former Smoker BMI and BSA Data Body Mass Index Body Surface Area  
 31.28 kg/m 2 2.21 m 2 Preferred Pharmacy Pharmacy Name Phone [id number] [address] [phone] Your Updated Medication List  
  
   
This list is accurate as of: 8/31/17 10:08 AM.  Always use your most recent med list.  
  
  
  
  
 glipiZIDE SR 10 mg CR tablet Commonly known as:  GLUCOTROL XL Take 10 mg by mouth two (2) times a day. Indications: type 2 diabetes mellitus LIPITOR 10 mg tablet Generic drug:  atorvastatin Take 10 mg by mouth every evening. lisinopril 5 mg tablet Commonly known as:  Jimmie Boehringer Take 5 mg by mouth daily. metoprolol succinate 50 mg XL tablet Commonly known as:  TOPROL-XL Take 50 mg by mouth daily. Indications: HYPERTENSION  
  
 multivitamin tablet Commonly known as:  ONE A DAY Take 1 Tab by mouth daily. ONGLYZA 5 mg Tab tablet Generic drug:  sAXagliptin Take 5 mg by mouth daily. PRILOSEC PO Take 20 mg by mouth daily. Indications: GERD  
  
 terazosin 5 mg capsule Commonly known as:  HYTRIN Take 5 mg by mouth daily. Follow-up Instructions Return in about 3 months (around 11/30/2017). To-Do List   
 09/30/2017 Imaging:  MRI ABD W WO CONT Referral Information Referral ID Referred By Referred To  
  
 [id number] Fan ARROYO Not Available Visits Status Start Date End Date 1 New Request 8/31/17 8/31/18 If your referral has a status of pending review or denied, additional information will be sent to support the outcome of this decision. Introducing hospitals & HEALTH SERVICES! Dear Jacob Braswell: Thank you for requesting a [employer] account. Our records indicate that you already have an active [employer] account. You can access your account anytime at [email or web address] Did you know that you can access your hospital and ER discharge instructions at any time in [employer]? You can also review all of your test results from your hospital stay or ER visit. Additional Information If you have questions, please visit the Frequently Asked Questions section of the [employer] website at [email or web address] Remember, [employer] is NOT to be used for urgent needs. For medical emergencies, dial 911. Now available from your iPhone and Android! Please provide this summary of care documentation to your next provider. Your primary care clinician is listed as VENITA PARKS. If you have any questions after today's visit, please call [phone].

## 2017-08-31 NOTE — PROGRESS NOTES
[address] Lemus MD, [address]       GAIL Tucker PA-C Racheal Brewer, ACNP-BC   GAIL Monique NP        at [address], [address] [facility], [address]     [phone]     FAX: [phone]    at [facility], [address] [place] [address], [address]     [phone]     FAX: [phone]       Patient Care Team:  Flora Solis MD as PCP - General (Family Practice)  Angie Arshad MD (Gastroenterology)  Mike Fragoso NP as Consulting Provider (Gastroenterology)  Christal Alves MD as Consulting Provider (Orthopedic Surgery)  Justice Tinoco MD (Cardiology)        Problem List  Date Reviewed: 3/1/2017          Codes Class Noted    H/O alcohol abuse ICD-10-CM: Z87.898  ICD-9-CM: 305.03  Unknown    Overview Signed 4/27/2016  6:37 PM by Amaury Schaffer V     quit few months ago, particpates in [place] [address]             H/O tobacco use, presenting hazards to health ICD-10-CM: Z87.891  ICD-9-CM: V15.82  Unknown    Overview Signed 4/27/2016  6:37 PM by Gisele Polanco V     quit few months ago             CAD (coronary artery disease), native coronary artery ICD-10-CM: I25.10  ICD-9-CM: 414.01  Unknown    Overview Addendum 1/3/2014  3:55 PM by Sahil Polanco V     p/mLAD 60% ((FFR 0.84). Otherwise mild coronary artery disease (march 2013).  LV EF 65% (echo feb 2013)             Hepatic encephalopathy (HCC) ICD-10-CM: K72.90  ICD-9-CM: 572.2  2/26/2013        CKD (chronic kidney disease) ICD-10-CM: N18.9  ICD-9-CM: 585.9  2/26/2013        Esophageal varices ([address] ICD-10-CM: I85.00  ICD-9-CM: 456.1  2/26/2013        Cirrhosis ([address] ICD-10-CM: K74.60  ICD-9-CM: 571.5  2/2/2013    Overview Signed 2/2/2013  5:22 PM by Star Sheikh MD     Secondary to chronic HCV             Ascites ICD-10-CM: R18.8  ICD-9-CM: 789.59  2/2/2013 Hypertension ICD-10-CM: I10  ICD-9-CM: 401.9  5/23/2011        Ventral hernia, unspecified, without mention of obstruction or gangrene ICD-10-CM: K43.9  ICD-9-CM: 553.20  5/23/2011        Hyperlipidemia ICD-10-CM: E78.5  ICD-9-CM: 272.4  5/23/2011        Type II or unspecified type diabetes mellitus without mention of complication, not stated as uncontrolled ICD-10-CM: E11.9  ICD-9-CM: 250.00  5/23/2011        GERD (gastroesophageal reflux disease) ICD-10-CM: K21.9  ICD-9-CM: 530.81  5/23/2011        Ts's esophagus ICD-10-CM: K22.70  ICD-9-CM: 530.85  5/23/2011        Chronic hepatitis C ([facility] ICD-10-CM: B18.2  ICD-9-CM: 070.54  5/23/2011    Overview Signed 2/2/2013  5:24 PM by Justyna Carrasco MD     Genotype 1  Peginterferon/ribavirin non-response as part of HALT-C clinical trial.    Peginterferon maintenance therapy during HALT-C clinical trial.   Conatus non-responder study. Discontinued secondary to joint pain. Peginterferon/Riba/Boceprevir. Treated for 12 weeks with significant anemia and thrombocytopenia. Non-responder. 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 The active problem list, all pertinent past medical history, medications, liver histology, endoscopic studies, radiologic findings and laboratory findings related to the liver disorder were reviewed with the patient. Ascites resolved. The patient has not developed hepatic encephalopathy. The patient has esophageal varices without bleeding. The patient has began, but has not completed liver transplant evaluation testing. The Thallium stress test demonstrated a fixed and 2 small reversible lesions. Cardiac catheterization did not indicate the need for stenting.        Mr. Dea Rodriguez was noted to have a nodule on his thyroid while being worked up for a pleural effusion. He had a biopsy that was negative for malignancy. We have discussed the liver transplant process and various centers he could go to for liver transplantation. He and his wife would like to be seen at [place], [place] if this becomes neccessary. Mr. Jaden Hidalgo completed 12 weeks of treatment with sofosbuvir and simeprevir in June 2014. He had a SVR two years post treatment. Since Mr. Taisha Bui last office visit he has been feeling well. He denies any further alcohol use since January 2016. He states that he continues to participate in [place]. Dr. Janes Yao has performed 2 more HALO procedures for St esophagus since his last office visit. No varices were identified during these procedures. Mr. Jaden Hidalgo is overall feeling well since his last office visit. His lower extremity edema has completely resolved and he is currently not taking any diuretics. He deneis any shortness of breath. He continues to struggle with arthralgias due to osteoarthritis. He does have difficulty completing daily activities due to arthritis. The patient has not experienced problems concentrating, swelling of the abdomen, swelling of the lower extremities, hematemesis, hematochezia. ALLERGIES:  No Known Allergies    Current Outpatient Prescriptions   Medication Sig    lisinopril (PRINIVIL, ZESTRIL) 5 mg tablet Take 5 mg by mouth daily.  terazosin (HYTRIN) 5 mg capsule Take 5 mg by mouth daily.  atorvastatin (LIPITOR) 10 mg tablet Take 10 mg by mouth every evening.  metoprolol succinate (TOPROL-XL) 50 mg XL tablet Take 50 mg by mouth daily. Indications: HYPERTENSION    glipiZIDE SR (GLUCOTROL) 10 mg CR tablet Take 10 mg by mouth two (2) times a day. Indications: type 2 diabetes mellitus    saxagliptin (ONGLYZA) 5 mg Tab tablet Take 5 mg by mouth daily.  multivitamin (ONE A DAY) tablet Take 1 Tab by mouth daily.       OMEPRAZOLE (PRILOSEC PO) Take 20 mg by mouth daily. Indications: GERD     No current facility-administered medications for this visit. SYSTEM REVIEW NOT RELATED TO LIVER DISEASE OR REVIEWED ABOVE:  Constitution systems: Negative for fever, chills, weight gain, weight loss. Eyes: Negative for visual changes. ENT: Negative for sore throat, painful swallowing. Respiratory: Negative for cough, hemoptysis, SOB. Cardiology: Negative for chest pain, palpitations. GI:  Negative for constipation or diarrhea. : Negative for urinary frequency, dysuria, hematuria, nocturia. Skin: Negative for rash. Hematology: Positive for easy bruising. Negative for blood clots. Musculo-skelatal: Positive for weakness. Neurologic: Negative for headaches, dizziness, vertigo, memory problems not related to HE. Psychology: Negative for anxiety, depression. FAMILY/SOCIAL HISTORY:  The patient is . The patient has 2 children. Stooped smoking 1/2016. History of heavy alcohol use. Last alcohol consumption was 1/2016. The patient retired in 2010. PHYSICAL EXAMINATION:    Visit Vitals    /77 (BP 1 Location: Right arm, BP Patient Position: Sitting)    Pulse 66    Temp 97.4 °F (36.3 °C) (Tympanic)    Resp 16    Ht 5' 10\" (1.778 m)    Wt 218 lb (98.9 kg)    SpO2 96%    BMI 31.28 kg/m2       PHYSICAL EXAMINATION:  VS: per nursing note  General: No acute distress. Eyes: Sclera anicteric. ENT: No oral lesions. Thyroid normal.  Nodes: No adenopathy. Skin: No spider angiomata. No jaundice. No palmar erythema. Respiratory: Lungs clear to auscultation. Cardiovascular: Regular heart rate. No murmurs. No JVD. Abdomen: Soft non-tender, liver size normal to percussion/palpation. Spleen not palpable. No obvious ascites. Extremities: No edema. No muscle wasting. No gross arthritic changes. Neurologic: Alert and oriented. Cranial nerves grossly intact. No asterixis.       [address] [facility] Ref Rng & Units 3/1/2017 8/29/2016 3/10/2016   WBC 4.6 - 13.2 K/uL 3.1 (L) 2.3 (L) 2.4 (L)   ANC 1.8 - 8.0 K/UL 1.9 1.4 (L) 1.3 (L)   HGB 13.0 - 16.0 g/dL 10.9 (L) 9.5 (L) 10.0 (L)    - 420 K/uL 76 (L) 63 (L) 55 (L)   INR 0.8 - 1.2   1.3 (H) 1.3 (H) 1.4 (H)   AST 15 - 37 U/L 34 38 (H) 31   ALT 16 - 61 U/L 33 20 20   Alk Phos 45 - 117 U/L 139 (H) 181 (H) 127 (H)   Bili, Total 0.2 - 1.0 MG/DL 0.5 0.7 0.8   Bili, Direct 0.0 - 0.2 MG/DL 0.2 0.2 0.5 (H)   Albumin 3.4 - 5.0 g/dL 3.6 3.7 2.3 (L)   BUN 7.0 - 18 MG/DL 21 (H) 19 (H) 20 (H)   Creat 0.6 - 1.3 MG/DL 1.19 1.19 1.41 (H)   Na 136 - 145 mmol/L 145 143 141   K 3.5 - 5.5 mmol/L 4.7 4.2 4.7   Cl 100 - 108 mmol/L 109 (H) 109 (H) 110 (H)   CO2 21 - 32 mmol/L 24 25 25   Glucose 74 - 99 mg/dL 93 91 165 (H)   Ammonia 27 - 102 ug/dL        Cancer Screening Latest Ref Rng & Units 3/1/2017 8/29/2016 3/10/2016   AFP Tumor Marker 0.0 - 8.3 ng/mL      AFP, Serum 0.0 - 8.0 ng/mL 10.5 (H) 11.3 (H) 15.4 (H)   AFP-L3% 0.0 - 9.9 % 8.4 7.7 9.6     SEROLOGY  2/2012. Anti-HCV negative, CMV IgG positive, EBV IgG positive,     LIVER HISTOLOGY  None available. ENDOSCOPIC PROCEDURES  11/2009:  EGD. Reported to demonstrate varices and Sts esophagus. 12/2010:  EGD. Grade I-II esophageal varices. 12/2010:  Colonoscopy - unremarkable. 3/2013. EGD by Dr Tasia Genao. Report requested. 05/27/2014:  EGD per MLS. Multiple medium esophageal varices. Six bands placed. Barrets esophagus (bx negative for malignancy). Repeat in 6 weeks. 09/2014: EGD per MLS. Small esophageal varices. Mild portal hypertensive gastropathy. No gastric varices. Polyp biopsied in the duodenal bulb found to be carcinoid by pathology. Large segment of Barretts esophagus. Biopsy technologically difficult due to bleeding. 02/2015:  EGD per Dr. Gi Corley. Esophageal varices. Seven bands placed. Portal hypertensive gastropathy. St esophagus. 3/2015: EGD per Dr. Gi Corley. St esophagus. Portal hypertensive gastropathy. Varices. 4/2015: EGD per Dr. Divine Barrios. Esophageal varices. Seven bands placed. Portal hypertensive gastropathy. St esophagus. Will continue EGDs every four months. Can not perform HALO procedure until varices completley obliterated. 9/2015:  EGD per Dr. Divine Barrios. Grade 1 varices. Four bands applied. Repeat in 6 months. If varices obliterated will perform Halo. 9/2016:  EGD per Dr. Lynette Samson. Small varices in mid esophagus. Distal esophagus with several columns of St's as previously noted. Banding of this mucosa was attempted but bands would not apply. The mucosa was friable and started to bleed actively. 1:10:000 epinephrine was injected (5 ml) at various bleeding sites. This did not control the bleeding. Gold probe cautery was then applies to the bleeding sites and hemostasis was achieved. 11/2016:  EGD with HALO per Dr. Eliud Andrade. [place] colored mucosa was seen at 34 cm and extended upto 38 cm. No nodules or ulceration was seen. Scarring from previous band ligation of varices was seen. No varices were seen. Small sliding hiatal hernia was seen. Stomach: Streaky erythema was seen in antrum and was radiating towards the pylorus. No gastric varices were seen Duodenum: normal  2/2017:  EGD with HALO per Dr. Eliud Andrade. Repeat in 3 months. 05/2017. EGD by Dr. Divine Barrios. HALO for St's. Repeat in 3 months. 08/2017. EGD by Dr. Divine Barrios. HALO for St's. Repeat in 3 months. RADIOLOGY  1/2009:  Ultrasound liver - echogenic consistent with cirrhosis, no liver mass lesions, no ascites. 11/2010:  Abdominal ultrasound - no focal liver lesions noted, no ascites. 03/2012:  Ultrasound of the liver. Echogenic consistent with chronic liver disease, cirrhosis. No liver mass lesions. No ascites. 09/2012: Ultrasound of the liver. Echogenic consistent with chronic liver disease, cirrhosis. No liver mass lesions. No ascites.    2/12:  Chest x-ray PA and Lateral: Normal heart size without consolidation. Band of atelectasis or scarring noted in the retrosternal region. There is blunting on the right CP angle due to small effusion. 2/2013:  Ultrasound of liver. Echogenic consistent with cirrhosis. 2.5 x 1.7 lesion in left lobe. Appears larger than on previous US. No dilated bile ducts. No ascites. 2/2013. MRI abdomen. No contrast given. Changes consistent with cirrhosis. No liver mass lesions. No dilated bile ducts. No bile duct strictures. No ascites. 03/2013 - Triple phase CT of abdomen. Hypervascular mass in left lobe stable and consistent with hemangioma. Stable non enhancing mass in segment 4/5. Large right pleural effusion. 08/2013: Ultrasound of the liver. Echogenic consistent with cirrhosis. No ascites. No bile duct strictures. 4.5 x 1.9 x 2.1 hyperechoic circumscribed structure similar to to CT which indicated hemangioma. 02/2014: The previously documented hemangioma within the subcapsular left hepatic lobe  is not identified on this exam. Hyperechoic mass within segment V/IV is identified, shows some interval increase since comparison ultrasound (maximum diameter currently 3.2 cm, previously 2.5 cm) but prior MRI suggests this represents a regenerative nodule. Second hyperechoic masslike area is now seen at subcapsular anterior right hepatic lobe measuring 3 cm maximally. This lesion is not clearly identified on prior ultrasound or MRI. Regenerative nodule is certainly possible but the finding is nonspecific.  04/2014:  Abdominal MRI. 2 cm lesion in the right hepatic lobe demonstrating faint arterial enhancement and early washout, concerning for hepatocellular carcinoma. 10/2014: Dynamic CT of the abdomen. Cirrhosis of the liver and evidence for portal hypertension with hepatomegaly and varices.  Probable hemangioma although now with slightly atypical appearance in the left lobe of the liver lateral segment is not significantly changed in the interval in size as far back as 2011. This is an atypical appearance and behavior for hepatocellular carcinoma. 3. Probable focal fibrosis or regenerative nodule in the interlobar fissure. 3/2015: Ultrasound of the liver. A relatively discrete nodule is present in the right lobe measuring 3.2 x 2.2 x 2.2 cm. This is visible in retrospect on the prior study of 2014 February and is not hypervascular or well-defined on the following CT and MR studies. 11/2015: Abdominal ultrasound: Nodule in right hepatic lobe unchanged from previous imaging. No new lesions. No ascites. 01/2016: Ultrasound of liver. Echogenic consistent with cirrhosis. No liver mass lesions. No dilated bile ducts. No ascites. 9/2016: Ultrasound of the liver. Heterogeneous echotexture and lobulated contour of the liver, consistent with reported cirrhosis. Echogenic right hepatic mass is grossly stable given slight differences in technique/positioning. No new hepatic mass was seen. Additional previously seen left hepatic mass is not visualized on today's exam.  04/2017. Ultrasound of the liver. Redemonstrated heterogeneous echotexture and lobulated contour of the liver, compatible with history of cirrhosis. Stable echogenic mass in the right hepatic lobe. No new mass identified. Previously seen mass in the left hepatic lobe on prior CT scan remains inconspicuous by ultrasound.     LIVER TRANSPLANT EVALUATION TESTING  2/2013. Blood ETOH positive. 2/2013. TSH 2.8, T3 112, T4 free 2.0  2/103. PSA 0.5  2/103. TB Quantiferon negative  2/2013. Blood type O+  2/2013. ECHO. Normal wall motion. LVEF 65%. No valve abnormalities. Trivial TR.  2/2013. FEV1 108% of predicted, FEV1% 103% of predicted, DLCO **% of predicted. 2/2013. Lexicon stress test.  LVEF 74%. Fixed inferior defect with normal wall motion. Small reversible defect at apex and in distal inferolateral apical wall.  02/2013:  DEXA scan - Osteopenia.     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 The most recent laboratory studies indicate that the liver transaminases are normal, alkaline phosphatase is elevated, tests of hepatic synthetic and metabolic function are normal, and the platelet count is depressed. Will perform laboratory testing to monitor liver function and degree of liver injury. This will include hepatic panel, a CBC w/ diff, a BMP, a PT/INR, and an AFP-L3%. Complications of cirrhosis were discussed in detail. We discussed thrombocytopenia, portal hypertension, varices, GI bleeding, peripheral edema, ascites, hepatic encephalopathy, and hepatocellular carcinoma. We discussed the need for follow ups on a regular basis, at 3 month intervals to monitor for complications. We discussed the need for every 6 month liver imaging studies. Chronic HCV, genotype 1. He completed twelve weeks of treatment with simeprevir and sofosbuvir in June 2014. He had a SVR 2 years post treatment. Peripheral edema. Resolved. Esophageal varicies without prior bleeding. He has had repeat EGDs with HALO procedure performed. No varices identified. Hepatic encephalopathy has not developed to date. There is no need for treatment with lactulose and/or xifaxan at this time. ETOH abuse. Continue AA. Will repeat ETOH at every office visit. ETOH level ordered today. The patient had a postive thalium stress test with reversible ischemia. He has a cardiac catheterization. No significant ASCAD identified. Thrombocytopenia secondary to cirrhosis. There is no evidence of overt bleeding. There is no indication for platelet transfusions or pharmacologic treatment to increase the platelet count. [facility] [address] screening. Dynamic MRI ordered today to better characterize right hepatic lobe mass. All of the above issues were discussed with the patient. All questions were answered.   The patient expressed a clear understanding of the above. [facility] 87 in 12 weeks.        Jaden Lee NP  [facility] [address] [place] [address], [address] [place] [address] Otilia Valles, [address] [facility]   [phone]

## 2017-09-01 LAB
AFP L3 MFR SERPL: 7.7 % (ref 0–9.9)
AFP SERPL-MCNC: 8.2 NG/ML (ref 0–8)

## 2017-09-11 ENCOUNTER — TELEPHONE (OUTPATIENT)
Dept: HEMATOLOGY | Age: 62
End: 2017-09-11

## 2017-09-11 NOTE — TELEPHONE ENCOUNTER
Patient would like for you to give him a call in regards to his lab results, He have some questions and concerns.

## 2017-11-09 ENCOUNTER — HOSPITAL ENCOUNTER (OUTPATIENT)
Dept: MRI IMAGING | Age: 62
Discharge: HOME | End: 2017-11-09
Payer: COMMERCIAL

## 2017-11-09 DIAGNOSIS — K70.30 ALCOHOLIC CIRRHOSIS OF LIVER WITHOUT ASCITES (HCC): ICD-10-CM

## 2017-11-09 LAB — CREAT UR-MCNC: 1.5 MG/DL (ref 0.6–1.3)

## 2017-11-09 PROCEDURE — A9585 GADOBUTROL INJECTION: HCPCS | Performed by: NURSE PRACTITIONER

## 2017-11-09 PROCEDURE — 74183 MRI ABD W/O CNTR FLWD CNTR: CPT

## 2017-11-09 PROCEDURE — 82565 ASSAY OF CREATININE: CPT

## 2017-11-09 PROCEDURE — 74011250636 HC RX REV CODE- 250/636: Performed by: NURSE PRACTITIONER

## 2017-11-09 RX ADMIN — GADOBUTROL 7.5 ML: 604.72 INJECTION INTRAVENOUS at 10:54

## 2017-11-13 ENCOUNTER — TELEPHONE (OUTPATIENT)
Dept: HEMATOLOGY | Age: 62
End: 2017-11-13

## 2017-11-13 NOTE — TELEPHONE ENCOUNTER
Updated on recent MRI. LI-RADS 4 lesion will further evaluated by Dr. Hemanth Daigle. Follow up as scheduled.

## 2017-11-20 ENCOUNTER — TELEPHONE (OUTPATIENT)
Dept: HEMATOLOGY | Age: 62
End: 2017-11-20

## 2017-12-04 ENCOUNTER — HOSPITAL ENCOUNTER (OUTPATIENT)
Dept: LAB | Age: 62
Discharge: HOME | End: 2017-12-04
Payer: COMMERCIAL

## 2017-12-04 ENCOUNTER — OFFICE VISIT (OUTPATIENT)
Dept: HEMATOLOGY | Age: 62
End: 2017-12-04

## 2017-12-04 ENCOUNTER — TELEPHONE (OUTPATIENT)
Dept: HEMATOLOGY | Age: 62
End: 2017-12-04

## 2017-12-04 VITALS
HEART RATE: 67 BPM | BODY MASS INDEX: 32.93 KG/M2 | HEIGHT: 70 IN | WEIGHT: 230 LBS | OXYGEN SATURATION: 98 % | TEMPERATURE: 96.5 F | DIASTOLIC BLOOD PRESSURE: 89 MMHG | SYSTOLIC BLOOD PRESSURE: 149 MMHG | RESPIRATION RATE: 16 BRPM

## 2017-12-04 DIAGNOSIS — K74.60 CIRRHOSIS OF LIVER WITHOUT ASCITES, UNSPECIFIED HEPATIC CIRRHOSIS TYPE (HCC): ICD-10-CM

## 2017-12-04 DIAGNOSIS — K74.60 CIRRHOSIS OF LIVER WITHOUT ASCITES, UNSPECIFIED HEPATIC CIRRHOSIS TYPE (HCC): Primary | ICD-10-CM

## 2017-12-04 LAB
ALBUMIN SERPL-MCNC: 3.7 G/DL (ref 3.4–5)
ALBUMIN/GLOB SERPL: 1 {RATIO} (ref 0.8–1.7)
ALP SERPL-CCNC: 180 U/L (ref 45–117)
ALT SERPL-CCNC: 24 U/L (ref 16–61)
ANION GAP SERPL CALC-SCNC: 13 MMOL/L (ref 3–18)
AST SERPL-CCNC: 29 U/L (ref 15–37)
BASOPHILS # BLD: 0 K/UL (ref 0–0.1)
BASOPHILS NFR BLD: 0 % (ref 0–3)
BILIRUB DIRECT SERPL-MCNC: 0.1 MG/DL (ref 0–0.2)
BILIRUB SERPL-MCNC: 0.6 MG/DL (ref 0.2–1)
BUN SERPL-MCNC: 32 MG/DL (ref 7–18)
BUN/CREAT SERPL: 27 (ref 12–20)
CALCIUM SERPL-MCNC: 9.4 MG/DL (ref 8.5–10.1)
CHLORIDE SERPL-SCNC: 108 MMOL/L (ref 100–108)
CO2 SERPL-SCNC: 22 MMOL/L (ref 21–32)
CREAT SERPL-MCNC: 1.18 MG/DL (ref 0.6–1.3)
DIFFERENTIAL METHOD BLD: ABNORMAL
EOSINOPHIL # BLD: 0.1 K/UL (ref 0–0.4)
EOSINOPHIL NFR BLD: 6 % (ref 0–5)
ERYTHROCYTE [DISTWIDTH] IN BLOOD BY AUTOMATED COUNT: 15.1 % (ref 11.6–14.5)
GLOBULIN SER CALC-MCNC: 3.7 G/DL (ref 2–4)
GLUCOSE SERPL-MCNC: 158 MG/DL (ref 74–99)
HCT VFR BLD AUTO: 31.5 % (ref 36–48)
HGB BLD-MCNC: 10.4 G/DL (ref 13–16)
INR PPP: 1.2 (ref 0.8–1.2)
LYMPHOCYTES # BLD: 0.4 K/UL (ref 0.8–3.5)
LYMPHOCYTES NFR BLD: 20 % (ref 20–51)
MCH RBC QN AUTO: 27.6 PG (ref 24–34)
MCHC RBC AUTO-ENTMCNC: 33 G/DL (ref 31–37)
MCV RBC AUTO: 83.6 FL (ref 74–97)
MONOCYTES # BLD: 0 K/UL (ref 0–1)
MONOCYTES NFR BLD: 2 % (ref 2–9)
NEUTS BAND NFR BLD MANUAL: 2 % (ref 0–5)
NEUTS SEG # BLD: 1.5 K/UL (ref 1.8–8)
NEUTS SEG NFR BLD: 70 % (ref 42–75)
PLATELET # BLD AUTO: 58 K/UL (ref 135–420)
PLATELET COMMENTS,PCOM: ABNORMAL
PMV BLD AUTO: 11.7 FL (ref 9.2–11.8)
POTASSIUM SERPL-SCNC: 5 MMOL/L (ref 3.5–5.5)
PROT SERPL-MCNC: 7.4 G/DL (ref 6.4–8.2)
PROTHROMBIN TIME: 14.5 SEC (ref 11.5–15.2)
RBC # BLD AUTO: 3.77 M/UL (ref 4.7–5.5)
RBC MORPH BLD: ABNORMAL
SODIUM SERPL-SCNC: 143 MMOL/L (ref 136–145)
WBC # BLD AUTO: 2.1 K/UL (ref 4.6–13.2)

## 2017-12-04 PROCEDURE — 82107 ALPHA-FETOPROTEIN L3: CPT | Performed by: NURSE PRACTITIONER

## 2017-12-04 PROCEDURE — 80076 HEPATIC FUNCTION PANEL: CPT | Performed by: NURSE PRACTITIONER

## 2017-12-04 PROCEDURE — 85025 COMPLETE CBC W/AUTO DIFF WBC: CPT | Performed by: NURSE PRACTITIONER

## 2017-12-04 PROCEDURE — 85610 PROTHROMBIN TIME: CPT | Performed by: NURSE PRACTITIONER

## 2017-12-04 PROCEDURE — 36415 COLL VENOUS BLD VENIPUNCTURE: CPT | Performed by: NURSE PRACTITIONER

## 2017-12-04 PROCEDURE — 80048 BASIC METABOLIC PNL TOTAL CA: CPT | Performed by: NURSE PRACTITIONER

## 2017-12-04 NOTE — MR AVS SNAPSHOT
Visit Information Date & Time Provider Department Dept. Phone Encounter #  
 12/4/2017  9:00 AM GAIL Diamond 13 of [facility] [id number] Follow-up Instructions Return in about 8 weeks (around 1/29/2018). Upcoming Health Maintenance Date Due HEMOGLOBIN A1C Q6M 1955 FOOT EXAM Q1 12/28/1964 MICROALBUMIN Q1 12/28/1964 EYE EXAM RETINAL OR DILATED Q1 12/28/1964 Pneumococcal 19-64 Highest Risk (1 of 3 - PCV13) 12/28/1973 DTaP/Tdap/Td series (1 - Tdap) 12/28/1975 FOBT Q 1 YEAR AGE 50-75 12/28/2004 LIPID PANEL Q1 3/28/2014 ZOSTER VACCINE AGE 60> 10/28/2014 Influenza Age 5 to Adult 8/1/2017 Allergies as of 12/4/2017  Review Complete On: 12/4/2017 By: Bill Barreto No Known Allergies Current Immunizations  Never Reviewed No immunizations on file. Not reviewed this visit You Were Diagnosed With   
  
 Codes Comments Cirrhosis of liver without ascites, unspecified hepatic cirrhosis type ([facility]    -  Primary ICD-10-CM: K74.60 ICD-9-CM: 571.5 Vitals BP Pulse Temp Resp Height(growth percentile) 149/89 (BP 1 Location: Right arm, BP Patient Position: Sitting) 67 96.5 °F (35.8 °C) (Tympanic) 16 5' 10\" (1.778 m) Weight(growth percentile) SpO2 BMI Smoking Status 230 lb (104.3 kg) 98% 33 kg/m2 Former Smoker BMI and BSA Data Body Mass Index Body Surface Area  
 33 kg/m 2 2.27 m 2 Preferred Pharmacy Pharmacy Name Phone [id number] [address] [phone] Your Updated Medication List  
  
   
This list is accurate as of: 12/4/17  9:28 AM.  Always use your most recent med list.  
  
  
  
  
 glipiZIDE SR 10 mg CR tablet Commonly known as:  GLUCOTROL XL Take 10 mg by mouth two (2) times a day. Indications: type 2 diabetes mellitus LIPITOR 10 mg tablet Generic drug:  atorvastatin Take 10 mg by mouth every evening. lisinopril 5 mg tablet Commonly known as:  Nikki Shellie Take 5 mg by mouth daily. metoprolol succinate 50 mg XL tablet Commonly known as:  TOPROL-XL Take 50 mg by mouth daily. Indications: HYPERTENSION  
  
 multivitamin tablet Commonly known as:  ONE A DAY Take 1 Tab by mouth daily. ONGLYZA 5 mg Tab tablet Generic drug:  sAXagliptin Take 5 mg by mouth daily. PRILOSEC PO Take 20 mg by mouth daily. Indications: GERD  
  
 terazosin 5 mg capsule Commonly known as:  HYTRIN Take 5 mg by mouth daily. Follow-up Instructions Return in about 8 weeks (around 1/29/2018). Introducing [facility]! Dear Tomas Perez: Thank you for requesting a [employer] account. Our records indicate that you already have an active [employer] account. You can access your account anytime at [email or web address] Did you know that you can access your hospital and ER discharge instructions at any time in [employer]? You can also review all of your test results from your hospital stay or ER visit. Additional Information If you have questions, please visit the Frequently Asked Questions section of the [employer] website at [email or web address] Remember, [employer] is NOT to be used for urgent needs. For medical emergencies, dial 911. Now available from your iPhone and Android! Please provide this summary of care documentation to your next provider. Your primary care clinician is listed as VENITA PARKS. If you have any questions after today's visit, please call [phone].

## 2017-12-04 NOTE — PROGRESS NOTES
[address] MD Allan, [address], [facility], [facility], NP    DONITA Huntley, [facility]   Jade Luna, GAIL Thomas NP        at [address]     [address] [place] [address], [address] [address]     [phone]     FAX: [phone]    at [address], [address] [place] [address], [address]     [phone]     FAX: [phone]       Patient Care Team:  David Jasso MD as PCP - General (Family Practice)  Adriane Alanis MD (Gastroenterology)  Trevor Serrano NP as Consulting Provider (Gastroenterology)  Stacey Carrera MD as Consulting Provider (Orthopedic Surgery)  Adam Carreno MD (Cardiology)        Problem List  Date Reviewed: 8/31/2017          Codes Class Noted    H/O alcohol abuse ICD-10-CM: Z87.898  ICD-9-CM: 305.03  Unknown    Overview Signed 4/27/2016  6:37 PM by Leatha Durán V     quit few months ago, particpates in [place]             H/O tobacco use, presenting hazards to health ICD-10-CM: Z87.891  ICD-9-CM: V15.82  Unknown    Overview Signed 4/27/2016  6:37 PM by Mona Polanco V     quit few months ago             CAD (coronary artery disease), native coronary artery ICD-10-CM: I25.10  ICD-9-CM: 414.01  Unknown    Overview Addendum 1/3/2014  3:55 PM by Sahil Polanco V     p/mLAD 60% ((FFR 0.84). Otherwise mild coronary artery disease (march 2013).  LV EF 65% (echo feb 2013)             Hepatic encephalopathy (HCC) ICD-10-CM: K72.90  ICD-9-CM: 572.2  2/26/2013        CKD (chronic kidney disease) ICD-10-CM: N18.9  ICD-9-CM: 585.9  2/26/2013        Esophageal varices ([address] ICD-10-CM: I85.00  ICD-9-CM: 456.1  2/26/2013        Cirrhosis ([facility] [address] ICD-10-CM: K74.60  ICD-9-CM: 571.5  2/2/2013    Overview Signed 2/2/2013  5:22 PM by Sushil Ross MD     Secondary to chronic HCV             Ascites ICD-10-CM: R18.8  ICD-9-CM: 789.59  2/2/2013 Hypertension ICD-10-CM: I10  ICD-9-CM: 401.9  5/23/2011        Ventral hernia, unspecified, without mention of obstruction or gangrene ICD-10-CM: K43.9  ICD-9-CM: 553.20  5/23/2011        Hyperlipidemia ICD-10-CM: E78.5  ICD-9-CM: 272.4  5/23/2011        Type II or unspecified type diabetes mellitus without mention of complication, not stated as uncontrolled ICD-10-CM: E11.9  ICD-9-CM: 250.00  5/23/2011        GERD (gastroesophageal reflux disease) ICD-10-CM: K21.9  ICD-9-CM: 530.81  5/23/2011        St's esophagus ICD-10-CM: K22.70  ICD-9-CM: 530.85  5/23/2011        Chronic hepatitis C ([facility] [address] ICD-10-CM: B18.2  ICD-9-CM: 070.54  5/23/2011    Overview Signed 2/2/2013  5:24 PM by Michelle Schmitt MD     Genotype 1  Peginterferon/ribavirin non-response as part of HALT-C clinical trial.    Peginterferon maintenance therapy during HALT-C clinical trial.   Conatus non-responder study. Discontinued secondary to joint pain. Peginterferon/Riba/Boceprevir. Treated for 12 weeks with significant anemia and thrombocytopenia. Non-responder. 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 The active problem list, all pertinent past medical history, medications, liver histology, endoscopic studies, radiologic findings and laboratory findings related to the liver disorder were reviewed with the patient. Ascites resolved. The patient has not developed hepatic encephalopathy. The patient has esophageal varices without bleeding. The patient has began, but has not completed liver transplant evaluation testing. The Thallium stress test demonstrated a fixed and 2 small reversible lesions. Cardiac catheterization did not indicate the need for stenting.        Mr. Junior Argueta was noted to have a nodule on his thyroid while being worked up for a pleural effusion. He had a biopsy that was negative for malignancy. We have discussed the liver transplant process and various centers he could go to for liver transplantation. He and his wife would like to be seen at [place], [place] if this becomes neccessary. Mr. Kirstie Cazares completed 12 weeks of treatment with sofosbuvir and simeprevir in June 2014. He had a SVR two years post treatment. Since Mr. Tyron Fernando last office visit he has been feeling well. He denies any further alcohol use since January 2016. He states that he continues to participate in [place]. Dr. Kittie Buerger has performed HALO procedures for St esophagus. No varices were identified during these procedures. Mr. Kirstie Cazares is overall feeling well since his last office visit. His lower extremity edema has completely resolved and he is currently not taking any diuretics. He deneis any shortness of breath. He continues to struggle with arthralgias due to osteoarthritis. He does have difficulty completing daily activities due to arthritis. The patient has not experienced problems concentrating, swelling of the abdomen, swelling of the lower extremities, hematemesis, hematochezia. ALLERGIES:  No Known Allergies    Current Outpatient Prescriptions   Medication Sig    lisinopril (PRINIVIL, ZESTRIL) 5 mg tablet Take 5 mg by mouth daily.  terazosin (HYTRIN) 5 mg capsule Take 5 mg by mouth daily.  atorvastatin (LIPITOR) 10 mg tablet Take 10 mg by mouth every evening.  metoprolol succinate (TOPROL-XL) 50 mg XL tablet Take 50 mg by mouth daily. Indications: HYPERTENSION    glipiZIDE SR (GLUCOTROL) 10 mg CR tablet Take 10 mg by mouth two (2) times a day. Indications: type 2 diabetes mellitus    saxagliptin (ONGLYZA) 5 mg Tab tablet Take 5 mg by mouth daily.  multivitamin (ONE A DAY) tablet Take 1 Tab by mouth daily.  OMEPRAZOLE (PRILOSEC PO) Take 20 mg by mouth daily.  Indications: GERD     No current facility-administered medications for this visit. SYSTEM REVIEW NOT RELATED TO LIVER DISEASE OR REVIEWED ABOVE:  Constitution systems: Negative for fever, chills, weight gain, weight loss. Eyes: Negative for visual changes. ENT: Negative for sore throat, painful swallowing. Respiratory: Negative for cough, hemoptysis, SOB. Cardiology: Negative for chest pain, palpitations. GI:  Negative for constipation or diarrhea. : Negative for urinary frequency, dysuria, hematuria, nocturia. Skin: Negative for rash. Hematology: Positive for easy bruising. Negative for blood clots. Musculo-skelatal: Positive for weakness. Neurologic: Negative for headaches, dizziness, vertigo, memory problems not related to HE. Psychology: Negative for anxiety, depression. FAMILY/SOCIAL HISTORY:  The patient is . The patient has 2 children. Stooped smoking 1/2016. History of heavy alcohol use. Last alcohol consumption was 1/2016. The patient retired in 2010. PHYSICAL EXAMINATION:    Visit Vitals    /89 (BP 1 Location: Right arm, BP Patient Position: Sitting)    Pulse 67    Temp 96.5 °F (35.8 °C) (Tympanic)    Resp 16    Ht 5' 10\" (1.778 m)    Wt 230 lb (104.3 kg)    SpO2 98%    BMI 33 kg/m2       PHYSICAL EXAMINATION:  VS: per nursing note  General: No acute distress. Eyes: Sclera anicteric. ENT: No oral lesions. Thyroid normal.  Nodes: No adenopathy. Skin: No spider angiomata. No jaundice. No palmar erythema. Respiratory: Lungs clear to auscultation. Cardiovascular: Regular heart rate. No murmurs. No JVD. Abdomen: Soft non-tender, liver size normal to percussion/palpation. Spleen not palpable. No obvious ascites. Extremities: No edema. No muscle wasting. No gross arthritic changes. Neurologic: Alert and oriented. Cranial nerves grossly intact. No asterixis.       LABORATORY:  [facility] [place] of [address] [place] [address] 8/31/2017 3/1/2017 8/29/2016   WBC 4.6 - 13.2 K/uL 3.4 (L) 3.1 (L) 2.3 (L)   ANC 1.8 - 8.0 K/UL 2.2 1.9 1.4 (L)   HGB 13.0 - 16.0 g/dL 10.9 (L) 10.9 (L) 9.5 (L)    - 420 K/uL 70 (L) 76 (L) 63 (L)   INR 0.8 - 1.2   1.3 (H) 1.3 (H) 1.3 (H)   AST 15 - 37 U/L 27 34 38 (H)   ALT 16 - 61 U/L 21 33 20   Alk Phos 45 - 117 U/L 136 (H) 139 (H) 181 (H)   Bili, Total 0.2 - 1.0 MG/DL 0.6 0.5 0.7   Bili, Direct 0.0 - 0.2 MG/DL 0.2 0.2 0.2   Albumin 3.4 - 5.0 g/dL 4.0 3.6 3.7   BUN 7.0 - 18 MG/DL 37 (H) 21 (H) 19 (H)   Creat 0.6 - 1.3 MG/DL 1.47 (H) 1.19 1.19   Creat (iSTAT) 0.6 - 1.3 MG/DL      Na 136 - 145 mmol/L 138 145 143   K 3.5 - 5.5 mmol/L 5.1 4.7 4.2   Cl 100 - 108 mmol/L 105 109 (H) 109 (H)   CO2 21 - 32 mmol/L 21 24 25   Glucose 74 - 99 mg/dL 128 (H) 93 91     Cancer Screening Latest Ref Rng & Units 8/31/2017 3/1/2017 8/29/2016   AFP Tumor Marker 0.0 - 8.3 ng/mL      AFP, Serum 0.0 - 8.0 ng/mL 8.2 (H) 10.5 (H) 11.3 (H)   AFP-L3% 0.0 - 9.9 % 7.7 8.4 7.7     SEROLOGY  2/2012. Anti-HCV negative, CMV IgG positive, EBV IgG positive,     LIVER HISTOLOGY  None available. ENDOSCOPIC PROCEDURES  11/2009:  EGD. Reported to demonstrate varices and Sts esophagus. 12/2010:  EGD. Grade I-II esophageal varices. 12/2010:  Colonoscopy - unremarkable. 3/2013. EGD by Dr Amanuel Benitez. Report requested. 05/27/2014:  EGD per MLS. Multiple medium esophageal varices. Six bands placed. Barrets esophagus (bx negative for malignancy). Repeat in 6 weeks. 09/2014: EGD per MLS. Small esophageal varices. Mild portal hypertensive gastropathy. No gastric varices. Polyp biopsied in the duodenal bulb found to be carcinoid by pathology. Large segment of Barretts esophagus. Biopsy technologically difficult due to bleeding. 02/2015:  EGD per Dr. Faisal Verduzco. Esophageal varices. Seven bands placed. Portal hypertensive gastropathy. St esophagus. 3/2015: EGD per Dr. Faisal Verduzco. St esophagus. Portal hypertensive gastropathy. Varices. 4/2015: EGD per Dr. Megan Stewart. Esophageal varices. Seven bands placed. Portal hypertensive gastropathy. St esophagus. Will continue EGDs every four months. Can not perform HALO procedure until varices completley obliterated. 9/2015:  EGD per Dr. Megan Stewart. Grade 1 varices. Four bands applied. Repeat in 6 months. If varices obliterated will perform Halo. 9/2016:  EGD per Dr. Sravan Aguilar. Small varices in mid esophagus. Distal esophagus with several columns of St's as previously noted. Banding of this mucosa was attempted but bands would not apply. The mucosa was friable and started to bleed actively. 1:10:000 epinephrine was injected (5 ml) at various bleeding sites. This did not control the bleeding. Gold probe cautery was then applies to the bleeding sites and hemostasis was achieved. 11/2016:  EGD with HALO per Dr. Yehuda Tobin. [place] colored mucosa was seen at 34 cm and extended upto 38 cm. No nodules or ulceration was seen. Scarring from previous band ligation of varices was seen. No varices were seen. Small sliding hiatal hernia was seen. Stomach: Streaky erythema was seen in antrum and was radiating towards the pylorus. No gastric varices were seen Duodenum: normal  2/2017:  EGD with HALO per Dr. Yehuda Tobin. Repeat in 3 months. 05/2017. EGD by Dr. Megan Stewart. HALO for Ts's. Repeat in 3 months. 08/2017. EGD by Dr. Megan Stewart. HALO for St's. Repeat in 3 months. RADIOLOGY  1/2009:  Ultrasound liver - echogenic consistent with cirrhosis, no liver mass lesions, no ascites. 11/2010:  Abdominal ultrasound - no focal liver lesions noted, no ascites. 03/2012:  Ultrasound of the liver. Echogenic consistent with chronic liver disease, cirrhosis. No liver mass lesions. No ascites. 09/2012: Ultrasound of the liver. Echogenic consistent with chronic liver disease, cirrhosis. No liver mass lesions. No ascites.    2/12:  Chest x-ray PA and Lateral:  Normal heart size without consolidation. Band of atelectasis or scarring noted in the retrosternal region. There is blunting on the right CP angle due to small effusion. 2/2013:  Ultrasound of liver. Echogenic consistent with cirrhosis. 2.5 x 1.7 lesion in left lobe. Appears larger than on previous US. No dilated bile ducts. No ascites. 2/2013. MRI abdomen. No contrast given. Changes consistent with cirrhosis. No liver mass lesions. No dilated bile ducts. No bile duct strictures. No ascites. 03/2013 - Triple phase CT of abdomen. Hypervascular mass in left lobe stable and consistent with hemangioma. Stable non enhancing mass in segment 4/5. Large right pleural effusion. 08/2013: Ultrasound of the liver. Echogenic consistent with cirrhosis. No ascites. No bile duct strictures. 4.5 x 1.9 x 2.1 hyperechoic circumscribed structure similar to to CT which indicated hemangioma. 02/2014: The previously documented hemangioma within the subcapsular left hepatic lobe  is not identified on this exam. Hyperechoic mass within segment V/IV is identified, shows some interval increase since comparison ultrasound (maximum diameter currently 3.2 cm, previously 2.5 cm) but prior MRI suggests this represents a regenerative nodule. Second hyperechoic masslike area is now seen at subcapsular anterior right hepatic lobe measuring 3 cm maximally. This lesion is not clearly identified on prior ultrasound or MRI. Regenerative nodule is certainly possible but the finding is nonspecific.  04/2014:  Abdominal MRI. 2 cm lesion in the right hepatic lobe demonstrating faint arterial enhancement and early washout, concerning for hepatocellular carcinoma. 10/2014: Dynamic CT of the abdomen. Cirrhosis of the liver and evidence for portal hypertension with hepatomegaly and varices.  Probable hemangioma although now with slightly atypical appearance in the left lobe of the liver lateral segment is not significantly changed in the interval in size as far back as 2011. This is an atypical appearance and behavior for hepatocellular carcinoma. 3. Probable focal fibrosis or regenerative nodule in the interlobar fissure. 3/2015: Ultrasound of the liver. A relatively discrete nodule is present in the right lobe measuring 3.2 x 2.2 x 2.2 cm. This is visible in retrospect on the prior study of 2014 February and is not hypervascular or well-defined on the following CT and MR studies. 11/2015: Abdominal ultrasound: Nodule in right hepatic lobe unchanged from previous imaging. No new lesions. No ascites. 01/2016: Ultrasound of liver. Echogenic consistent with cirrhosis. No liver mass lesions. No dilated bile ducts. No ascites. 9/2016: Ultrasound of the liver. Heterogeneous echotexture and lobulated contour of the liver, consistent with reported cirrhosis. Echogenic right hepatic mass is grossly stable given slight differences in technique/positioning. No new hepatic mass was seen. Additional previously seen left hepatic mass is not visualized on today's exam.  04/2017. Ultrasound of the liver. Redemonstrated heterogeneous echotexture and lobulated contour of the liver, compatible with history of cirrhosis. Stable echogenic mass in the right hepatic lobe. No new mass identified. Previously seen mass in the left hepatic lobe on prior CT scan remains inconspicuous by ultrasound. 11/2017. Abdominal MRI w/wo contrast.  Hepatic cirrhosis. Li-RADS 4 lesion in hepatic segment 3 (probable hepatocellular carcinoma). Faint arterial phase hyperenhancement throughout the left hepatic lobe. While infiltrative lesion not absolutely excluded, suspect finding reflects intralesional shunting related to the lesion. Scattered additional Li-RADS 3 hepatic lesions (indeterminate).     LIVER TRANSPLANT EVALUATION TESTING  2/2013. Blood ETOH positive. 2/2013. TSH 2.8, T3 112, T4 free 2.0  2/103. PSA 0.5  2/103. TB Quantiferon negative  2/2013. Blood type O+  2/2013. ECHO.  Normal wall motion. LVEF 65%. No valve abnormalities. Trivial TR.  2/2013. FEV1 108% of predicted, FEV1% 103% of predicted, DLCO **% of predicted. 2/2013. Lexicon stress test.  LVEF 74%. Fixed inferior defect with normal wall motion. Small reversible defect at apex and in distal inferolateral apical wall.  02/2013:  DEXA scan - Osteopenia. 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 The most recent laboratory studies indicate that the liver transaminases are normal, alkaline phosphatase is elevated, tests of hepatic synthetic and metabolic function are normal, and the platelet count is depressed. Will perform laboratory testing to monitor liver function and degree of liver injury. This will include hepatic panel, a CBC w/ diff, a BMP, a PT/INR, and an AFP-L3%. Complications of cirrhosis were discussed in detail. We discussed thrombocytopenia, portal hypertension, varices, GI bleeding, peripheral edema, ascites, hepatic encephalopathy, and hepatocellular carcinoma. We discussed the need for follow ups on a regular basis, at 3 month intervals to monitor for complications. We discussed the need for every 6 month liver imaging studies. Recent imaging suggests that Mr. Karyle Alt has developed [address] He has been referred to Dr. Nat Mcallister for evaluation, but he has not been contacted by Dr. Patricia Vargas yet. Provided contact information to the patient. I updated our NN today as well to make sure that Mr. Karyle Alt is seen as soon as possible. Chronic HCV, genotype 1. He completed twelve weeks of treatment with simeprevir and sofosbuvir in June 2014. He had a SVR 2 years post treatment. Peripheral edema. Resolved. Esophageal varicies without prior bleeding. He has had repeat EGDs with HALO procedure performed. No varices identified. Hepatic encephalopathy has not developed to date.   There is no need for treatment with lactulose and/or xifaxan at this time. ETOH abuse. Continue AA. The patient had a postive thalium stress test with reversible ischemia. He has a cardiac catheterization. No significant ASCAD identified. Thrombocytopenia secondary to cirrhosis. There is no evidence of overt bleeding. There is no indication for platelet transfusions or pharmacologic treatment to increase the platelet count. [facility] [address] screening. Dynamic MRI was completed in 11/2017 and suggests emerging HCC, LI-RADS 3 and 4 lesion seen. All of the above issues were discussed with the patient. All questions were answered. The patient expressed a clear understanding of the above. [facility] 87 in 8 weeks.        Jonna Albright NP  [facility] [place] of [address] [place] [address], [address] [place] [address] [address]ia Valles, [address]   [phone]

## 2017-12-04 NOTE — TELEPHONE ENCOUNTER
Pt would like you to give him a call, states Alina from [place] (Dr. Clarke Allen) office has NOT reached out to him yet . Jennifer Dominique

## 2017-12-05 ENCOUNTER — PATIENT OUTREACH (OUTPATIENT)
Dept: HEMATOLOGY | Age: 62
End: 2017-12-05

## 2017-12-05 NOTE — PROGRESS NOTES
Phone call placed to patient. Introduced self and role with [address]. Patient notified to expect a call to schedule consult with Dr. Diamond Ordaz to discuss MRI and probable [address] Patient reports he has already spoken to [facility] and he's scheduled to see Dr. Diamond Ordaz on 12/7. Provided patient with NN name and contact information.

## 2017-12-07 LAB
AFP L3 MFR SERPL: 8.3 % (ref 0–9.9)
AFP SERPL-MCNC: 6.8 NG/ML (ref 0–8)

## 2017-12-11 ENCOUNTER — TELEPHONE (OUTPATIENT)
Dept: HEMATOLOGY | Age: 62
End: 2017-12-11

## 2017-12-13 NOTE — TELEPHONE ENCOUNTER
Updated on plan. He will have the imaging completed with Dr. Connie Mcrae and we will work him up for liver transplant ASAP.

## 2017-12-19 ENCOUNTER — HOSPITAL ENCOUNTER (OUTPATIENT)
Dept: CT IMAGING | Age: 62
Discharge: HOME | End: 2017-12-19
Attending: RADIOLOGY
Payer: COMMERCIAL

## 2017-12-19 DIAGNOSIS — C22.0 HEPATOMA (HCC): ICD-10-CM

## 2017-12-19 LAB — CREAT UR-MCNC: 1.1 MG/DL (ref 0.6–1.3)

## 2017-12-19 PROCEDURE — 74170 CT ABD WO CNTRST FLWD CNTRST: CPT

## 2017-12-19 PROCEDURE — 82565 ASSAY OF CREATININE: CPT

## 2017-12-19 PROCEDURE — 74011636320 HC RX REV CODE- 636/320: Performed by: RADIOLOGY

## 2017-12-19 RX ADMIN — IOPAMIDOL 100 ML: 755 INJECTION, SOLUTION INTRAVENOUS at 10:06

## 2017-12-27 ENCOUNTER — HOSPITAL ENCOUNTER (OUTPATIENT)
Dept: CT IMAGING | Age: 62
Discharge: HOME | End: 2017-12-27
Attending: RADIOLOGY | Admitting: RADIOLOGY
Payer: COMMERCIAL

## 2017-12-27 VITALS
DIASTOLIC BLOOD PRESSURE: 67 MMHG | HEART RATE: 71 BPM | BODY MASS INDEX: 32.21 KG/M2 | WEIGHT: 225 LBS | HEIGHT: 70 IN | SYSTOLIC BLOOD PRESSURE: 130 MMHG | RESPIRATION RATE: 16 BRPM | OXYGEN SATURATION: 99 % | TEMPERATURE: 98.2 F

## 2017-12-27 DIAGNOSIS — C22.0 HEPATOMA (HCC): ICD-10-CM

## 2017-12-27 LAB
ANION GAP SERPL CALC-SCNC: 7 MMOL/L (ref 3–18)
APTT PPP: 31.1 SEC (ref 23–36.4)
BUN SERPL-MCNC: 31 MG/DL (ref 7–18)
BUN/CREAT SERPL: 24 (ref 12–20)
CALCIUM SERPL-MCNC: 8.7 MG/DL (ref 8.5–10.1)
CHLORIDE SERPL-SCNC: 111 MMOL/L (ref 100–108)
CO2 SERPL-SCNC: 24 MMOL/L (ref 21–32)
CREAT SERPL-MCNC: 1.3 MG/DL (ref 0.6–1.3)
ERYTHROCYTE [DISTWIDTH] IN BLOOD BY AUTOMATED COUNT: 15 % (ref 11.6–14.5)
GLUCOSE BLD STRIP.AUTO-MCNC: 123 MG/DL (ref 70–110)
GLUCOSE BLD STRIP.AUTO-MCNC: 153 MG/DL (ref 70–110)
GLUCOSE SERPL-MCNC: 152 MG/DL (ref 74–99)
HCT VFR BLD AUTO: 32.1 % (ref 36–48)
HGB BLD-MCNC: 10.6 G/DL (ref 13–16)
INR PPP: 1.2 (ref 0.8–1.2)
MCH RBC QN AUTO: 27.3 PG (ref 24–34)
MCHC RBC AUTO-ENTMCNC: 33 G/DL (ref 31–37)
MCV RBC AUTO: 82.7 FL (ref 74–97)
PLATELET # BLD AUTO: 58 K/UL (ref 135–420)
PMV BLD AUTO: 11.5 FL (ref 9.2–11.8)
POTASSIUM SERPL-SCNC: 4.4 MMOL/L (ref 3.5–5.5)
PROTHROMBIN TIME: 15 SEC (ref 11.5–15.2)
RBC # BLD AUTO: 3.88 M/UL (ref 4.7–5.5)
SODIUM SERPL-SCNC: 142 MMOL/L (ref 136–145)
WBC # BLD AUTO: 2.5 K/UL (ref 4.6–13.2)

## 2017-12-27 PROCEDURE — 82962 GLUCOSE BLOOD TEST: CPT

## 2017-12-27 PROCEDURE — 88333 PATH CONSLTJ SURG CYTO XM 1: CPT | Performed by: RADIOLOGY

## 2017-12-27 PROCEDURE — 80048 BASIC METABOLIC PNL TOTAL CA: CPT | Performed by: RADIOLOGY

## 2017-12-27 PROCEDURE — 85027 COMPLETE CBC AUTOMATED: CPT | Performed by: RADIOLOGY

## 2017-12-27 PROCEDURE — 85610 PROTHROMBIN TIME: CPT | Performed by: RADIOLOGY

## 2017-12-27 PROCEDURE — 85730 THROMBOPLASTIN TIME PARTIAL: CPT | Performed by: RADIOLOGY

## 2017-12-27 PROCEDURE — 74011250636 HC RX REV CODE- 250/636

## 2017-12-27 PROCEDURE — 88334 PATH CONSLTJ SURG CYTO XM EA: CPT | Performed by: RADIOLOGY

## 2017-12-27 PROCEDURE — 47000 NEEDLE BIOPSY OF LIVER PERQ: CPT

## 2017-12-27 PROCEDURE — 74011250636 HC RX REV CODE- 250/636: Performed by: RADIOLOGY

## 2017-12-27 PROCEDURE — 88307 TISSUE EXAM BY PATHOLOGIST: CPT | Performed by: RADIOLOGY

## 2017-12-27 PROCEDURE — 74011000272 HC RX REV CODE- 272

## 2017-12-27 RX ORDER — MIDAZOLAM HYDROCHLORIDE 1 MG/ML
1 INJECTION, SOLUTION INTRAMUSCULAR; INTRAVENOUS
Status: DISCONTINUED | OUTPATIENT
Start: 2017-12-27 | End: 2017-12-27 | Stop reason: HOSPADM

## 2017-12-27 RX ORDER — FENTANYL CITRATE 50 UG/ML
12.5-5 INJECTION, SOLUTION INTRAMUSCULAR; INTRAVENOUS
Status: DISCONTINUED | OUTPATIENT
Start: 2017-12-27 | End: 2017-12-27 | Stop reason: HOSPADM

## 2017-12-27 RX ORDER — MIDAZOLAM HYDROCHLORIDE 1 MG/ML
INJECTION, SOLUTION INTRAMUSCULAR; INTRAVENOUS
Status: COMPLETED
Start: 2017-12-27 | End: 2017-12-27

## 2017-12-27 RX ORDER — OXYCODONE AND ACETAMINOPHEN 5; 325 MG/1; MG/1
1 TABLET ORAL
Status: DISCONTINUED | OUTPATIENT
Start: 2017-12-27 | End: 2017-12-27 | Stop reason: HOSPADM

## 2017-12-27 RX ORDER — SODIUM CHLORIDE 9 MG/ML
20 INJECTION, SOLUTION INTRAVENOUS CONTINUOUS
Status: DISCONTINUED | OUTPATIENT
Start: 2017-12-27 | End: 2017-12-27 | Stop reason: HOSPADM

## 2017-12-27 RX ORDER — FENTANYL CITRATE 50 UG/ML
INJECTION, SOLUTION INTRAMUSCULAR; INTRAVENOUS
Status: COMPLETED
Start: 2017-12-27 | End: 2017-12-27

## 2017-12-27 RX ADMIN — FENTANYL CITRATE 50 MCG: 50 INJECTION, SOLUTION INTRAMUSCULAR; INTRAVENOUS at 10:35

## 2017-12-27 RX ADMIN — FENTANYL CITRATE 50 MCG: 50 INJECTION, SOLUTION INTRAMUSCULAR; INTRAVENOUS at 10:40

## 2017-12-27 RX ADMIN — GELATIN ABSORBABLE SPONGE 12-7 MM: 12-7 MISC at 10:43

## 2017-12-27 RX ADMIN — SODIUM CHLORIDE 20 ML/HR: 900 INJECTION, SOLUTION INTRAVENOUS at 08:24

## 2017-12-27 RX ADMIN — MIDAZOLAM HYDROCHLORIDE 1 MG: 1 INJECTION, SOLUTION INTRAMUSCULAR; INTRAVENOUS at 10:35

## 2017-12-27 RX ADMIN — MIDAZOLAM HYDROCHLORIDE 1 MG: 1 INJECTION, SOLUTION INTRAMUSCULAR; INTRAVENOUS at 10:40

## 2017-12-27 RX ADMIN — FENTANYL CITRATE 50 MCG: 50 INJECTION INTRAMUSCULAR; INTRAVENOUS at 10:35

## 2017-12-27 NOTE — IP AVS SNAPSHOT
Juan Carlos Canton 
 
 
 [address] [facility] [address] Patient: Nickolas Del Angel MRN: [id number] :1955 About your hospitalization You were admitted on:  2017 You last received care in the:   [facility] You were discharged on:  2017 Why you were hospitalized Your primary diagnosis was:  Not on File Things You Need To Do (next 8 weeks) Follow up with Ghassan Stephens. Hima Ramirez MD  
  
Phone:  [phone] Where:  [address] [facility], , [address] , [place]. [address] Follow up with Ori Cordero MD in 1 week(s) Phone:  [phone] Where:  [address] [facility], [address] Radiologist, [place] [address] [facility] [address]  Follow Up with Norma Ha NP at  9:00 AM  
Where: [address] [facility] [address] ([address]) Discharge Orders None A check barbara indicates which time of day the medication should be taken. My Medications ASK your physician about these medications Instructions Each Dose to Equal  
 Morning Noon Evening Bedtime  
 glipiZIDE SR 10 mg CR tablet Commonly known as:  GLUCOTROL XL Your last dose was: Your next dose is: Take 10 mg by mouth two (2) times a day. Indications: type 2 diabetes mellitus 10 mg  
    
   
   
   
  
 LIPITOR 10 mg tablet Generic drug:  atorvastatin Your last dose was: Your next dose is: Take 10 mg by mouth every evening. 10 mg  
    
   
   
   
  
 lisinopril 5 mg tablet Commonly known as:  Lori Primmer Your last dose was: Your next dose is: Take 5 mg by mouth daily. 5 mg  
    
   
   
   
  
 metoprolol succinate 50 mg XL tablet Commonly known as:  TOPROL-XL Your last dose was: Your next dose is: Take 50 mg by mouth daily. Indications: HYPERTENSION 50 mg  
    
   
   
   
  
 multivitamin tablet Commonly known as:  ONE A DAY Your last dose was: Your next dose is: Take 1 Tab by mouth daily. 1 Tab ONGLYZA 5 mg Tab tablet Generic drug:  sAXagliptin Your last dose was: Your next dose is: Take 5 mg by mouth daily. 5 mg PRILOSEC PO Your last dose was: Your next dose is: Take 20 mg by mouth daily. Indications: GERD 20 mg  
    
   
   
   
  
 terazosin 5 mg capsule Commonly known as:  HYTRIN Your last dose was: Your next dose is: Take 5 mg by mouth daily. 5 mg Discharge Instructions Percutaneous Liver Biopsy: What to Expect at Home Your Recovery Percutaneous liver biopsy is a procedure to take a tiny sample (biopsy) of your liver tissue. Percutaneous (say \"per-antonette-MARK-tristan-us) means \"through the skin. \" The procedure is also called aspiration biopsy or fine-needle aspiration. The tissue sample is looked at under a microscope. Your doctor can look for infection or other liver problems. You may have some pain where the biopsy needle entered your skin (the puncture site). You may also have pain in your shoulder. This is called referred pain. It is caused by pain traveling along a nerve near the biopsy site. The referred pain usually lasts less than 12 hours. You may have a small amount of bleeding from the puncture site. You will need to take it easy at home for 1 or 2 days after the procedure. You will probably be able to return to work and most of your usual activities after that. This care sheet gives you a general idea about how long it will take for you to recover. But each person recovers at a different pace. Follow the steps below to get better as quickly as possible. How can you care for yourself at home? Activity ? · Rest when you feel tired. Getting enough sleep will help you recover. ? · Try to walk each day. Start by walking a little more than you did the day before. Bit by bit, increase the amount you walk. Walking boosts blood flow and helps prevent pneumonia and constipation. ? · Avoid exercises that use your belly muscles and strenuous activities, such as bicycle riding, jogging, weight lifting, or aerobic exercise, for 1 week or until your doctor says it is okay. ? · Ask your doctor when you can drive again. ? · You will probably need to take 1 or 2 days off from work. It depends on the type of work you do and how you feel. ? · You will probably be able to shower the same day as the test, if your doctor says it is okay. Pat the puncture site dry. Do not take a bath for at least 2 days after the test, or until your doctor tells you it is okay. Diet ? · You can eat your normal diet. If your stomach is upset, try bland, low-fat foods like plain rice, broiled chicken, toast, and yogurt. ? · Drink plenty of fluids (unless your doctor tells you not to). Medicines ? · Your doctor will tell you if and when you can restart your medicines. He or she will also give you instructions about taking any new medicines. ? · If you take blood thinners, such as warfarin (Coumadin), clopidogrel (Plavix), or aspirin, be sure to talk to your doctor. He or she will tell you if and when to start taking those medicines again. Make sure that you understand exactly what your doctor wants you to do. ? · Be safe with medicines. Take pain medicines exactly as directed. ¨ If the doctor gave you a prescription medicine for pain, take it as prescribed. ¨ If you are not taking a prescription pain medicine, take an over-the-counter medicine that your doctor recommends. Read and follow all instructions on the label. ¨ Do not take aspirin, ibuprofen (Advil, Motrin), naproxen (Aleve), or other nonsteroidal anti-inflammatory drugs (NSAIDs) unless your doctor says it is okay. ? · If you think your pain medicine is making you sick to your stomach: 
¨ Take your medicine after meals (unless your doctor has told you not to). ¨ Ask your doctor for a different kind of pain medicine. ?Care of the puncture site ? · Keep a bandage over the puncture site for the first 1 or 2 days. Follow-up care is a key part of your treatment and safety. Be sure to make and go to all appointments, and call your doctor if you are having problems. It's also a good idea to know your test results and keep a list of the medicines you take. When should you call for help? Call 911 anytime you think you may need emergency care. For example, call if: 
? · You passed out (lost consciousness). ? · You have severe trouble breathing. ? · You have sudden chest pain and shortness of breath, or you cough up blood. ? · You have severe pain in your chest, shoulder, or belly. ?Call your doctor now or seek immediate medical care if: 
? · You have new or worse shortness of breath. ? · Bright red blood has soaked through the bandage over the puncture site. ? · You have pain that does not get better after you take your pain medicine. ? · You are sick to your stomach or cannot keep fluids down. ? · You have a fever, chills, or body aches. ? · You have signs of infection, such as: 
¨ Increased pain, swelling, warmth, or redness. ¨ Red streaks leading from the puncture site. ¨ Pus draining from the puncture site. ¨ A fever. ? · You have new or worse pain at the puncture site. ? · You have new or worse belly swelling or bloating. ? · You have trouble passing urine or stool. ? · Your stools are black and tarlike or have streaks of blood. ? · You have pale-colored stools along with dark urine and itching. ?Watch closely for changes in your health, and be sure to contact your doctor if you have any problems. Where can you learn more? Go to http://fermin-ana maria.info/. Enter P411 in the search box to learn more about \"Percutaneous Liver Biopsy: What to Expect at Home. \" Current as of: October 14, 2016 Content Version: 11.4 © 7211-5863 [employer]. Care instructions adapted under license by [employer] (which disclaims liability or warranty for this information). If you have questions about a medical condition or this instruction, always ask your healthcare professional. [place] [address] any warranty or liability for your use of this information. DISCHARGE SUMMARY from Nurse PATIENT INSTRUCTIONS: 
 
 
F-face looks uneven A-arms unable to move or move unevenly S-speech slurred or non-existent T-time-call 911 as soon as signs and symptoms begin-DO NOT go Back to bed or wait to see if you get better-TIME IS BRAIN. Warning Signs of HEART ATTACK Call 911 if you have these symptoms: 
? Chest discomfort. Most heart attacks involve discomfort in the center of the chest that lasts more than a few minutes, or that goes away and comes back. It can feel like uncomfortable pressure, squeezing, fullness, or pain. ? Discomfort in other areas of the upper body. Symptoms can include pain or discomfort in one or both arms, the back, neck, jaw, or stomach. ? Shortness of breath with or without chest discomfort. ? Other signs may include breaking out in a cold sweat, nausea, or lightheadedness. Don't wait more than five minutes to call [address]! Fast action can save your life. Calling 911 is almost always the fastest way to get lifesaving treatment. Emergency Medical Services staff can begin treatment when they arrive  up to an hour sooner than if someone gets to the hospital by car. The discharge information has been reviewed with the patient and spouse. The patient and spouse verbalized understanding. Discharge medications reviewed with the patient and spouse and appropriate educational materials and side effects teaching were provided. ___________________________________________________________________________________________________________________________________ Introducing hospitals & HEALTH SERVICES! Dear Tomas Perez: Thank you for requesting a [facility] account. Our records indicate that you already have an active [facility] account. You can access your account anytime at [email or web address] Did you know that you can access your hospital and ER discharge instructions at any time in [facility]? You can also review all of your test results from your hospital stay or ER visit. Additional Information If you have questions, please visit the Frequently Asked Questions section of the [facility] website at [email or web address] Remember, [facility] is NOT to be used for urgent needs. For medical emergencies, dial 911. Now available from your iPhone and Android! Unresulted Labs-Please follow up with your PCP about these lab tests Order Current Status CT BX LIVER NDL PERC In process Providers Seen During Your Hospitalization Provider Specialty Primary office phone Anu Fernandez MD Radiology [phone] Your Primary Care Physician (PCP) Primary Care Physician Office Phone Office Nicolex Broderick Gonzales [phone] You are allergic to the following No active allergies Recent Documentation Height Weight BMI Smoking Status 1.778 m 102.1 kg 32.28 kg/m2 Former Smoker Emergency Contacts Name Discharge Info Relation Home Work Mobile [place] CAREGIVER [3] Spouse [3] [phone] Patient Belongings The following personal items are in your possession at time of discharge: 
  Dental Appliances: None Please provide this summary of care documentation to your next provider. Signatures-by signing, you are acknowledging that this After Visit Summary has been reviewed with you and you have received a copy. Patient Signature:  ____________________________________________________________ Date:  ____________________________________________________________  
  
Donna Adrian Provider Signature:  ____________________________________________________________ Date:  ____________________________________________________________

## 2017-12-27 NOTE — H&P
OUTPATIENT HISTORY AND PHYSICAL      Today 12/27/2017     Indication/Symptoms:   Kareem Mcbride is a 57 y.o. male with a history of chronic HCV and alcohol abuse with abdominal MRI suggestive of hepatocellular carcinoma. He now presents to IR for an image-guided liver mass biopsy with moderate sedation. Patient has been NPO since midnight and takes no blood thinning medications. Current Meds:    Prior to Admission medications    Medication Sig Start Date End Date Taking? Authorizing Provider   lisinopril (PRINIVIL, ZESTRIL) 5 mg tablet Take 5 mg by mouth daily. Yes Historical Provider   terazosin (HYTRIN) 5 mg capsule Take 5 mg by mouth daily. 4/14/17  Yes Historical Provider   atorvastatin (LIPITOR) 10 mg tablet Take 10 mg by mouth every evening. Yes Historical Provider   metoprolol succinate (TOPROL-XL) 50 mg XL tablet Take 50 mg by mouth daily. Indications: HYPERTENSION 4/5/16  Yes Historical Provider   glipiZIDE SR (GLUCOTROL) 10 mg CR tablet Take 10 mg by mouth two (2) times a day. Indications: type 2 diabetes mellitus   Yes Historical Provider   saxagliptin (ONGLYZA) 5 mg Tab tablet Take 5 mg by mouth daily. Yes Historical Provider   multivitamin (ONE A DAY) tablet Take 1 Tab by mouth daily. Yes Historical Provider   OMEPRAZOLE (PRILOSEC PO) Take 20 mg by mouth daily. Indications: GERD   Yes Historical Provider       Allergies:    No Known Allergies    Comorbid Conditions:    Past Medical History:   Diagnosis Date    St's esophagus 5/23/2011    CAD (coronary artery disease), native coronary artery     p/mLAD 60% ((FFR 0.84). Otherwise mild coronary artery disease.      Chronic hepatitis C without mention of hepatic coma, genotype 1 5/23/2011    Chronic pain     back    GERD (gastroesophageal reflux disease) 5/23/2011    H/O alcohol abuse     quit few months ago, particpates in [place] [address]    H/O tobacco use, presenting hazards to health     quit few months ago    History of echocardiogram 02/19/2013    EF 65%. No reg'l WMA. RVSP 25 mmHg.  History of myocardial perfusion scan 02/19/2013    Fixed inferior defect, likely artifact. Sm, mild reversible apical defect concerning for mild ischemia; could be apical thinning. Sm reversible distal inferolateral apical wall. Sm distal inferolateral, apical, & inferoapical defect possibly ischemia. EF 74%. No WMA. Neg EKG on pharm stress test.    Hyperlipidemia 5/23/2011    Hypertension 20yrs+- 1990's    Liver disease     chronic hep c   -non detectable since 2013    Liver disease     cirrhosis    OA (osteoarthritis)     Other ill-defined conditions(799.89)     triglycerides    S/P cardiac cath 03/28/2013    p/mLAD 60% ((FFR 0.84). Otherwise mild coronary artery disease. RA 9.  RV 48/14. PA 50/20. W 25. COCI:  6.5/3.2 (TD) and 6.8/3.2 (Ingrid).       Thrombocytopenia, secondary to cirrhosis 5/23/2011    Type II or unspecified type diabetes mellitus without mention of complication, not stated as uncontrolled 2007    Ventral hernia, unspecified, without mention of obstruction or gangrene 5/23/2011          Past Surgical History:   Procedure Laterality Date    HX CATARACT REMOVAL      bilateral    HX COLECTOMY      diverticulitis    HX COLOSTOMY      then reversable    HX GI  December 2010    endoscopy    HX GI      bowel resection - one surgery as of 3/11/2015    HX GI      EGD with halo    HX HEART CATHETERIZATION  3/28/2013    no stents    HX HERNIA REPAIR      3x ventral hernia, 1 testicular    [address] [facility]    right wrist fracture (old injury) - and right carpal tunnel    HX ORTHOPAEDIC Right 08/2016    wrist surgery - fusion and second carpal tunnel     Data:    Visit Vitals    /86 (BP Patient Position: At rest)    Pulse 64    Temp 97.7 °F (36.5 °C)    Resp 18    Ht 5' 10\" (1.778 m)    Wt 102.1 kg (225 lb)    SpO2 99%    BMI 32.28 kg/m2   :  Recent Labs      12/27/17   0824   PLT  58* Recent Labs      12/27/17   0824   INR  1.2   APTT  31.1       The H & P and/or progress notes and any available imaging were reviewed. The risks, indications and possible alternatives to the procedure, including doing nothing, were discussed and informed consent was obtained. Physical Exam:      Mental status:   Alert and oriented. Examination specific to the procedure proposed to be performed and any co morbid conditions:   Mallampati classification 3 ,  ASA 3   Heart:   Regular rate. Lungs:   Normal respiratory effort. Symmetrical rise and fall of chest.     The patient is an appropriate candidate to undergo the planned procedure and sedation.     Marisa Cerna

## 2017-12-27 NOTE — PROCEDURES
RADIOLOGY POST PROCEDURE NOTE     December 27, 2017       2:48 PM     Preoperative Diagnosis:   Liver mass. Postoperative Diagnosis:  Same. :  Dr. Hemanth Daigle    Assistant:  None. Type of Anesthesia: 1% plain lidocaine and IV moderate sedation with Versed and Fentanyl    Procedure/Description:  Ct guided left hepatic lobe mass core needle Bx. Findings:   No bleeding. Estimated blood Loss:  Minimal    Specimen Removed:   yes    Blood transfusions:  None. Implants:  None.     Complications: None    Condition: Stable    Discharge Plan:  discharge home     Shey Tamez MD

## 2017-12-27 NOTE — DISCHARGE INSTRUCTIONS
Percutaneous Liver Biopsy: What to Expect at [facility]  Percutaneous liver biopsy is a procedure to take a tiny sample (biopsy) of your liver tissue. Percutaneous (say \"per-kew-MARK-tristan-) means \"through the skin. \" The procedure is also called aspiration biopsy or fine-needle aspiration. The tissue sample is looked at under a microscope. Your doctor can look for infection or other liver problems. You may have some pain where the biopsy needle entered your skin (the puncture site). You may also have pain in your shoulder. This is called referred pain. It is caused by pain traveling along a nerve near the biopsy site. The referred pain usually lasts less than 12 hours. You may have a small amount of bleeding from the puncture site. You will need to take it easy at home for 1 or 2 days after the procedure. You will probably be able to return to work and most of your usual activities after that. This care sheet gives you a general idea about how long it will take for you to recover. But each person recovers at a different pace. Follow the steps below to get better as quickly as possible. How can you care for yourself at home? Activity  ? · Rest when you feel tired. Getting enough sleep will help you recover. ? · Try to walk each day. Start by walking a little more than you did the day before. Bit by bit, increase the amount you walk. Walking boosts blood flow and helps prevent pneumonia and constipation. ? · Avoid exercises that use your belly muscles and strenuous activities, such as bicycle riding, jogging, weight lifting, or aerobic exercise, for 1 week or until your doctor says it is okay. ? · Ask your doctor when you can drive again. ? · You will probably need to take 1 or 2 days off from work. It depends on the type of work you do and how you feel. ? · You will probably be able to shower the same day as the test, if your doctor says it is okay. Pat the puncture site dry.  Do not take a bath for at least 2 days after the test, or until your doctor tells you it is okay. Diet  ? · You can eat your normal diet. If your stomach is upset, try bland, low-fat foods like plain rice, broiled chicken, toast, and yogurt. ? · Drink plenty of fluids (unless your doctor tells you not to). Medicines  ? · Your doctor will tell you if and when you can restart your medicines. He or she will also give you instructions about taking any new medicines. ? · If you take blood thinners, such as warfarin (Coumadin), clopidogrel (Plavix), or aspirin, be sure to talk to your doctor. He or she will tell you if and when to start taking those medicines again. Make sure that you understand exactly what your doctor wants you to do. ? · Be safe with medicines. Take pain medicines exactly as directed. ¨ If the doctor gave you a prescription medicine for pain, take it as prescribed. ¨ If you are not taking a prescription pain medicine, take an over-the-counter medicine that your doctor recommends. Read and follow all instructions on the label. ¨ Do not take aspirin, ibuprofen (Advil, Motrin), naproxen (Aleve), or other nonsteroidal anti-inflammatory drugs (NSAIDs) unless your doctor says it is okay. ? · If you think your pain medicine is making you sick to your stomach:  ¨ Take your medicine after meals (unless your doctor has told you not to). ¨ Ask your doctor for a different kind of pain medicine. ?Care of the puncture site  ? · Keep a bandage over the puncture site for the first 1 or 2 days. Follow-up care is a key part of your treatment and safety. Be sure to make and go to all appointments, and call your doctor if you are having problems. It's also a good idea to know your test results and keep a list of the medicines you take. When should you call for help? Call 911 anytime you think you may need emergency care. For example, call if:  ? · You passed out (lost consciousness).    ? · You have severe trouble breathing. ? · You have sudden chest pain and shortness of breath, or you cough up blood. ? · You have severe pain in your chest, shoulder, or belly. ?Call your doctor now or seek immediate medical care if:  ? · You have new or worse shortness of breath. ? · Bright red blood has soaked through the bandage over the puncture site. ? · You have pain that does not get better after you take your pain medicine. ? · You are sick to your stomach or cannot keep fluids down. ? · You have a fever, chills, or body aches. ? · You have signs of infection, such as:  ¨ Increased pain, swelling, warmth, or redness. ¨ Red streaks leading from the puncture site. ¨ Pus draining from the puncture site. ¨ A fever. ? · You have new or worse pain at the puncture site. ? · You have new or worse belly swelling or bloating. ? · You have trouble passing urine or stool. ? · Your stools are black and tarlike or have streaks of blood. ? · You have pale-colored stools along with dark urine and itching. ? Watch closely for changes in your health, and be sure to contact your doctor if you have any problems. Where can you learn more? Go to http://fermin-ana maria.info/. Enter X296 in the search box to learn more about \"Percutaneous Liver Biopsy: What to Expect at Home. \"  Current as of: October 14, 2016  Content Version: 11.4  © 4140-0951 [employer]. Care instructions adapted under license by [employer] (which disclaims liability or warranty for this information). If you have questions about a medical condition or this instruction, always ask your healthcare professional. [place] [address] any warranty or liability for your use of this information.     DISCHARGE SUMMARY from Nurse    PATIENT INSTRUCTIONS:    After general anesthesia or intravenous sedation, for 24 hours or while taking prescription Narcotics:  · Limit your activities  · Do not drive and operate hazardous machinery  · Do not make important personal or business decisions  · Do  not drink alcoholic beverages  · If you have not urinated within 8 hours after discharge, please contact your surgeon on call. Report the following to your surgeon:  · Excessive pain, swelling, redness or odor of or around the surgical area  · Temperature over 100.5  · Nausea and vomiting lasting longer than 4 hours or if unable to take medications  · Any signs of decreased circulation or nerve impairment to extremity: change in color, persistent  numbness, tingling, coldness or increase pain  · Any questions    *  Please give a list of your current medications to your Primary Care Provider. *  Please update this list whenever your medications are discontinued, doses are      changed, or new medications (including over-the-counter products) are added. *  Please carry medication information at all times in case of emergency situations. These are general instructions for a healthy lifestyle:    No smoking/ No tobacco products/ Avoid exposure to second hand smoke  Surgeon General's Warning:  Quitting smoking now greatly reduces serious risk to your health. Obesity, smoking, and sedentary lifestyle greatly increases your risk for illness    A healthy diet, regular physical exercise & weight monitoring are important for maintaining a healthy lifestyle    You may be retaining fluid if you have a history of heart failure or if you experience any of the following symptoms:  Weight gain of 3 pounds or more overnight or 5 pounds in a week, increased swelling in our hands or feet or shortness of breath while lying flat in bed. Please call your doctor as soon as you notice any of these symptoms; do not wait until your next office visit.     Recognize signs and symptoms of STROKE:    F-face looks uneven    A-arms unable to move or move unevenly    S-speech slurred or non-existent    T-time-call 911 as soon as signs and symptoms begin-DO NOT go       Back to bed or wait to see if you get better-TIME IS BRAIN. Warning Signs of HEART ATTACK     Call 911 if you have these symptoms:   Chest discomfort. Most heart attacks involve discomfort in the center of the chest that lasts more than a few minutes, or that goes away and comes back. It can feel like uncomfortable pressure, squeezing, fullness, or pain.  Discomfort in other areas of the upper body. Symptoms can include pain or discomfort in one or both arms, the back, neck, jaw, or stomach.  Shortness of breath with or without chest discomfort.  Other signs may include breaking out in a cold sweat, nausea, or lightheadedness. Don't wait more than five minutes to call 911 - MINUTES MATTER! Fast action can save your life. Calling 911 is almost always the fastest way to get lifesaving treatment. Emergency Medical Services staff can begin treatment when they arrive -- up to an hour sooner than if someone gets to the hospital by car. The discharge information has been reviewed with the patient and spouse. The patient and spouse verbalized understanding. Discharge medications reviewed with the patient and spouse and appropriate educational materials and side effects teaching were provided.   ___________________________________________________________________________________________________________________________________

## 2018-01-03 ENCOUNTER — PATIENT OUTREACH (OUTPATIENT)
Dept: HEMATOLOGY | Age: 63
End: 2018-01-03

## 2018-01-03 NOTE — PROGRESS NOTES
Received notification from Dr. Cheryl Mejía that patient's biopsy revealed hemangioma. Relayed information to NEELAM Bill NP. Phone call placed to patient. Reviewed biopsy findings. Patient notified no further f/u at Dr. Quintanilla Sport office is required. Patient notified abdominal imaging will continue to be performed at periodic intervals. Confirmed f/u appointment with NEELAM Bill NP on 1/30/18.

## 2018-01-30 ENCOUNTER — HOSPITAL ENCOUNTER (OUTPATIENT)
Dept: LAB | Age: 63
Discharge: HOME | End: 2018-01-30
Payer: COMMERCIAL

## 2018-01-30 ENCOUNTER — OFFICE VISIT (OUTPATIENT)
Dept: HEMATOLOGY | Age: 63
End: 2018-01-30

## 2018-01-30 VITALS
SYSTOLIC BLOOD PRESSURE: 147 MMHG | RESPIRATION RATE: 18 BRPM | HEART RATE: 62 BPM | TEMPERATURE: 98.6 F | BODY MASS INDEX: 32.78 KG/M2 | OXYGEN SATURATION: 98 % | HEIGHT: 70 IN | WEIGHT: 229 LBS | DIASTOLIC BLOOD PRESSURE: 89 MMHG

## 2018-01-30 DIAGNOSIS — K70.30 ALCOHOLIC CIRRHOSIS OF LIVER WITHOUT ASCITES (HCC): Primary | ICD-10-CM

## 2018-01-30 DIAGNOSIS — K70.30 ALCOHOLIC CIRRHOSIS OF LIVER WITHOUT ASCITES (HCC): ICD-10-CM

## 2018-01-30 PROBLEM — E11.21 TYPE 2 DIABETES MELLITUS WITH NEPHROPATHY (HCC): Status: ACTIVE | Noted: 2018-01-30

## 2018-01-30 LAB
ALBUMIN SERPL-MCNC: 3.8 G/DL (ref 3.4–5)
ALBUMIN/GLOB SERPL: 1.1 {RATIO} (ref 0.8–1.7)
ALP SERPL-CCNC: 113 U/L (ref 45–117)
ALT SERPL-CCNC: 24 U/L (ref 16–61)
ANION GAP SERPL CALC-SCNC: 10 MMOL/L (ref 3–18)
AST SERPL-CCNC: 19 U/L (ref 15–37)
BASOPHILS # BLD: 0 K/UL (ref 0–0.06)
BASOPHILS NFR BLD: 1 % (ref 0–2)
BILIRUB DIRECT SERPL-MCNC: 0.2 MG/DL (ref 0–0.2)
BILIRUB SERPL-MCNC: 0.5 MG/DL (ref 0.2–1)
BUN SERPL-MCNC: 25 MG/DL (ref 7–18)
BUN/CREAT SERPL: 21 (ref 12–20)
CALCIUM SERPL-MCNC: 9.5 MG/DL (ref 8.5–10.1)
CHLORIDE SERPL-SCNC: 103 MMOL/L (ref 100–108)
CO2 SERPL-SCNC: 27 MMOL/L (ref 21–32)
CREAT SERPL-MCNC: 1.18 MG/DL (ref 0.6–1.3)
DIFFERENTIAL METHOD BLD: ABNORMAL
EOSINOPHIL # BLD: 0.1 K/UL (ref 0–0.4)
EOSINOPHIL NFR BLD: 3 % (ref 0–5)
ERYTHROCYTE [DISTWIDTH] IN BLOOD BY AUTOMATED COUNT: 15 % (ref 11.6–14.5)
GLOBULIN SER CALC-MCNC: 3.5 G/DL (ref 2–4)
GLUCOSE SERPL-MCNC: 115 MG/DL (ref 74–99)
HCT VFR BLD AUTO: 38.1 % (ref 36–48)
HGB BLD-MCNC: 11.7 G/DL (ref 13–16)
INR PPP: 1.1 (ref 0.8–1.2)
LYMPHOCYTES # BLD: 0.5 K/UL (ref 0.9–3.6)
LYMPHOCYTES NFR BLD: 16 % (ref 21–52)
MCH RBC QN AUTO: 26.2 PG (ref 24–34)
MCHC RBC AUTO-ENTMCNC: 30.7 G/DL (ref 31–37)
MCV RBC AUTO: 85.4 FL (ref 74–97)
MONOCYTES # BLD: 0.3 K/UL (ref 0.05–1.2)
MONOCYTES NFR BLD: 9 % (ref 3–10)
NEUTS SEG # BLD: 2.3 K/UL (ref 1.8–8)
NEUTS SEG NFR BLD: 71 % (ref 40–73)
PLATELET # BLD AUTO: 68 K/UL (ref 135–420)
PMV BLD AUTO: 11.3 FL (ref 9.2–11.8)
POTASSIUM SERPL-SCNC: 4.8 MMOL/L (ref 3.5–5.5)
PROT SERPL-MCNC: 7.3 G/DL (ref 6.4–8.2)
PROTHROMBIN TIME: 14 SEC (ref 11.5–15.2)
RBC # BLD AUTO: 4.46 M/UL (ref 4.7–5.5)
SODIUM SERPL-SCNC: 140 MMOL/L (ref 136–145)
WBC # BLD AUTO: 3.2 K/UL (ref 4.6–13.2)

## 2018-01-30 PROCEDURE — 85610 PROTHROMBIN TIME: CPT | Performed by: NURSE PRACTITIONER

## 2018-01-30 PROCEDURE — 36415 COLL VENOUS BLD VENIPUNCTURE: CPT | Performed by: NURSE PRACTITIONER

## 2018-01-30 PROCEDURE — 80076 HEPATIC FUNCTION PANEL: CPT | Performed by: NURSE PRACTITIONER

## 2018-01-30 PROCEDURE — 82107 ALPHA-FETOPROTEIN L3: CPT | Performed by: NURSE PRACTITIONER

## 2018-01-30 PROCEDURE — 85025 COMPLETE CBC W/AUTO DIFF WBC: CPT | Performed by: NURSE PRACTITIONER

## 2018-01-30 PROCEDURE — 80048 BASIC METABOLIC PNL TOTAL CA: CPT | Performed by: NURSE PRACTITIONER

## 2018-01-30 RX ORDER — TESTOSTERONE 30 MG/1.5ML
SOLUTION TOPICAL
COMMUNITY

## 2018-01-30 NOTE — PROGRESS NOTES
[address] MD Allan, [address], [facility], [place]       GAIL Schrader Si, PA-C Zipporah Ellen, ABHAYP-BC   GAIL Caban NP        at [facility]     [address], [address] [address]     [phone]     FAX: [phone]    at [address], [address] [facility], [address]     [phone]     FAX: [phone]       Patient Care Team:  Twyla Le MD as PCP - General (Family Practice)  Tsering Benjamin MD as Consulting Provider (Orthopedic Surgery)  Drew Aaron MD (Gastroenterology)  Clayton Knight MD (Radiology)  Asia Pedersen MD (Internal Medicine)        Problem List  Date Reviewed: 8/31/2017          Codes Class Noted    Type 2 diabetes mellitus with nephropathy [facility]) ICD-10-CM: E11.21  ICD-9-CM: 250.40, 583.81  1/30/2018        H/O alcohol abuse ICD-10-CM: Z87.898  ICD-9-CM: 305.03  Unknown    Overview Signed 4/27/2016  6:37 PM by Altagracia Alexander V     quit few months ago, particpates in [place] [address]             H/O tobacco use, presenting hazards to health ICD-10-CM: Z87.891  ICD-9-CM: V15.82  Unknown    Overview Signed 4/27/2016  6:37 PM by Altagracia Alexander V     quit few months ago             CAD (coronary artery disease), native coronary artery ICD-10-CM: I25.10  ICD-9-CM: 414.01  Unknown    Overview Addendum 1/3/2014  3:55 PM by Sahil Polanco V     p/mLAD 60% ((FFR 0.84). Otherwise mild coronary artery disease (march 2013).  LV EF 65% (echo feb 2013)             Hepatic encephalopathy (HCC) ICD-10-CM: K72.90  ICD-9-CM: 572.2  2/26/2013        CKD (chronic kidney disease) ICD-10-CM: N18.9  ICD-9-CM: 585.9  2/26/2013        Esophageal varices ([facility] ICD-10-CM: I85.00  ICD-9-CM: 456.1  2/26/2013        Cirrhosis ([facility] ICD-10-CM: K74.60  ICD-9-CM: 571.5  2/2/2013    Overview Signed 2/2/2013  5:22 PM by Klever Stearns MD     Secondary to chronic HCV             Ascites ICD-10-CM: R18.8  ICD-9-CM: 789.59  2/2/2013        Hypertension ICD-10-CM: I10  ICD-9-CM: 401.9  5/23/2011        Ventral hernia, unspecified, without mention of obstruction or gangrene ICD-10-CM: K43.9  ICD-9-CM: 553.20  5/23/2011        Hyperlipidemia ICD-10-CM: E78.5  ICD-9-CM: 272.4  5/23/2011        Type II or unspecified type diabetes mellitus without mention of complication, not stated as uncontrolled ICD-10-CM: E11.9  ICD-9-CM: 250.00  5/23/2011        GERD (gastroesophageal reflux disease) ICD-10-CM: K21.9  ICD-9-CM: 530.81  5/23/2011        St's esophagus ICD-10-CM: K22.70  ICD-9-CM: 530.85  5/23/2011        Chronic hepatitis C ([facility] ICD-10-CM: B18.2  ICD-9-CM: 070.54  5/23/2011    Overview Signed 2/2/2013  5:24 PM by Shyla Pickard MD     Genotype 1  Peginterferon/ribavirin non-response as part of HALT-C clinical trial.    Peginterferon maintenance therapy during HALT-C clinical trial.   Conatus non-responder study. Discontinued secondary to joint pain. Peginterferon/Riba/Boceprevir. Treated for 12 weeks with significant anemia and thrombocytopenia. Non-responder. 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 The active problem list, all pertinent past medical history, medications, liver histology, endoscopic studies, radiologic findings and laboratory findings related to the liver disorder were reviewed with the patient. Ascites resolved. The patient has not developed hepatic encephalopathy. The patient has esophageal varices without bleeding. The patient has began, but has not completed liver transplant evaluation testing. The Thallium stress test demonstrated a fixed and 2 small reversible lesions.  Cardiac catheterization did not indicate the need for stenting. Mr. Lynn Everett was noted to have a nodule on his thyroid while being worked up for a pleural effusion. He had a biopsy that was negative for malignancy. The patient had a LI-RADS 4 lesions from abdominal MRI performed in 11/2017. This was worked up and biopsied. Biopsy demonstrated that this lesion is a hemangioma. We have discussed the liver transplant process and various centers he could go to for liver transplantation. He and his wife would like to be seen at [place], [place] if this becomes neccessary. Mr. Lynn Everett completed 12 weeks of treatment with sofosbuvir and simeprevir in June 2014. He had a SVR two years post treatment. Since Mr. Rafa Hall last office visit he has been feeling well. He denies any further alcohol use since January 2016. He states that he continues to participate in [place]. Dr. Rigoberto Jewell has performed HALO procedures for St esophagus. No varices were identified during these procedures. He has an appointment with him on 02/07/2018. Mr. Lynn Everett is overall feeling well since his last office visit. His lower extremity edema has completely resolved and he is currently not taking any diuretics. He deneis any shortness of breath. He continues to struggle with arthralgias due to osteoarthritis. He does have difficulty completing daily activities due to arthritis. The patient has not experienced problems concentrating, swelling of the abdomen, swelling of the lower extremities, hematemesis, hematochezia. ALLERGIES:  No Known Allergies    Current Outpatient Prescriptions   Medication Sig    testosterone 30 mg/actuation (1.5 mL) slpm by TransDERmal route.  lisinopril (PRINIVIL, ZESTRIL) 5 mg tablet Take 5 mg by mouth daily.  terazosin (HYTRIN) 5 mg capsule Take 5 mg by mouth daily.  atorvastatin (LIPITOR) 10 mg tablet Take 10 mg by mouth every evening.     metoprolol succinate (TOPROL-XL) 50 mg XL tablet Take 50 mg by mouth daily. Indications: HYPERTENSION    glipiZIDE SR (GLUCOTROL) 10 mg CR tablet Take 10 mg by mouth two (2) times a day. Indications: type 2 diabetes mellitus    saxagliptin (ONGLYZA) 5 mg Tab tablet Take 5 mg by mouth daily.  multivitamin (ONE A DAY) tablet Take 1 Tab by mouth daily.  OMEPRAZOLE (PRILOSEC PO) Take 20 mg by mouth daily. Indications: GERD     No current facility-administered medications for this visit. SYSTEM REVIEW NOT RELATED TO LIVER DISEASE OR REVIEWED ABOVE:  Constitution systems: Negative for fever, chills, weight gain, weight loss. Eyes: Negative for visual changes. ENT: Negative for sore throat, painful swallowing. Respiratory: Negative for cough, hemoptysis, SOB. Cardiology: Negative for chest pain, palpitations. GI:  Negative for constipation or diarrhea. : Negative for urinary frequency, dysuria, hematuria, nocturia. Skin: Negative for rash. Hematology: Positive for easy bruising. Negative for blood clots. Musculo-skelatal: Positive for weakness. Neurologic: Negative for headaches, dizziness, vertigo, memory problems not related to HE. Psychology: Negative for anxiety, depression. FAMILY/SOCIAL HISTORY:  The patient is . The patient has 2 children. Stooped smoking 1/2016. History of heavy alcohol use. Last alcohol consumption was 1/2016. The patient retired in 2010. PHYSICAL EXAMINATION:    Visit Vitals    /89 (BP 1 Location: Right arm, BP Patient Position: Sitting)    Pulse 62    Temp 98.6 °F (37 °C) (Tympanic)    Resp 18    Ht 5' 10\" (1.778 m)    Wt 229 lb (103.9 kg)    SpO2 98%    BMI 32.86 kg/m2       PHYSICAL EXAMINATION:  VS: per nursing note  General: No acute distress. Eyes: Sclera anicteric. ENT: No oral lesions. Thyroid normal.  Nodes: No adenopathy. Skin: No spider angiomata. No jaundice. No palmar erythema. Respiratory: Lungs clear to auscultation.    Cardiovascular: Regular heart rate.  No murmurs. No JVD. Abdomen: Soft non-tender, liver size normal to percussion/palpation. Spleen not palpable. No obvious ascites. Extremities: No edema. No muscle wasting. No gross arthritic changes. Neurologic: Alert and oriented. Cranial nerves grossly intact. No asterixis. LABORATORY:  Liver [facility] [address] & Units 12/4/2017   WBC 4.6 - 13.2 K/uL 2.1 (L)   ANC 1.8 - 8.0 K/UL 1.5 (L)   HGB 13.0 - 16.0 g/dL 10.4 (L)    - 420 K/uL 58 (L)   INR 0.8 - 1.2   1.2   AST 15 - 37 U/L 29   ALT 16 - 61 U/L 24   Alk Phos 45 - 117 U/L 180 (H)   Bili, Total 0.2 - 1.0 MG/DL 0.6   Bili, Direct 0.0 - 0.2 MG/DL 0.1   Albumin 3.4 - 5.0 g/dL 3.7   BUN 7.0 - 18 MG/DL 32 (H)   Creat 0.6 - 1.3 MG/DL 1.18   Creat (iSTAT) 0.6 - 1.3 MG/DL    Na 136 - 145 mmol/L 143   K 3.5 - 5.5 mmol/L 5.0   Cl 100 - 108 mmol/L 108   CO2 21 - 32 mmol/L 22   Glucose 74 - 99 mg/dL 158 (H)     Liver [facility] Latest Ref Rng & Units 8/31/2017 3/1/2017 8/29/2016   WBC 4.6 - 13.2 K/uL 3.4 (L) 3.1 (L) 2.3 (L)   ANC 1.8 - 8.0 K/UL 2.2 1.9 1.4 (L)   HGB 13.0 - 16.0 g/dL 10.9 (L) 10.9 (L) 9.5 (L)    - 420 K/uL 70 (L) 76 (L) 63 (L)   INR 0.8 - 1.2   1.3 (H) 1.3 (H) 1.3 (H)   AST 15 - 37 U/L 27 34 38 (H)   ALT 16 - 61 U/L 21 33 20   Alk Phos 45 - 117 U/L 136 (H) 139 (H) 181 (H)   Bili, Total 0.2 - 1.0 MG/DL 0.6 0.5 0.7   Bili, Direct 0.0 - 0.2 MG/DL 0.2 0.2 0.2   Albumin 3.4 - 5.0 g/dL 4.0 3.6 3.7   BUN 7.0 - 18 MG/DL 37 (H) 21 (H) 19 (H)   Creat 0.6 - 1.3 MG/DL 1.47 (H) 1.19 1.19   Creat (iSTAT) 0.6 - 1.3 MG/DL      Na 136 - 145 mmol/L 138 145 143   K 3.5 - 5.5 mmol/L 5.1 4.7 4.2   Cl 100 - 108 mmol/L 105 109 (H) 109 (H)   CO2 21 - 32 mmol/L 21 24 25   Glucose 74 - 99 mg/dL 128 (H) 93 91     Cancer Screening Latest Ref Rng & Units 12/4/2017 8/31/2017 3/1/2017   AFP, Serum 0.0 - 8.0 ng/mL 6.8 8.2 (H) 10.5 (H)   AFP-L3% 0.0 - 9.9 % 8.3 7.7 8.4     SEROLOGY  2/2012.   Anti-HCV negative, CMV IgG positive, EBV IgG positive,     LIVER HISTOLOGY  12/2017. Liver mass biopsy. Hepatocytes show some mild steatosis. The lesion in this case is a hemangioma, and there are several fragments of it along with the liver tissue, characterized by a fibrous background and scattered simple, capillary-like vessels. The architecture is cavernous. There is no evidence of malignancy. ENDOSCOPIC PROCEDURES  11/2009:  EGD. Reported to demonstrate varices and Sts esophagus. 12/2010:  EGD. Grade I-II esophageal varices. 12/2010:  Colonoscopy - unremarkable. 3/2013. EGD by Dr Quoc Almanza. Report requested. 05/27/2014:  EGD per MLS. Multiple medium esophageal varices. Six bands placed. Barrets esophagus (bx negative for malignancy). Repeat in 6 weeks. 09/2014: EGD per MLS. Small esophageal varices. Mild portal hypertensive gastropathy. No gastric varices. Polyp biopsied in the duodenal bulb found to be carcinoid by pathology. Large segment of Barretts esophagus. Biopsy technologically difficult due to bleeding. 02/2015:  EGD per Dr. Janis Magana. Esophageal varices. Seven bands placed. Portal hypertensive gastropathy. St esophagus. 3/2015: EGD per Dr. Janis Magana. St esophagus. Portal hypertensive gastropathy. Varices. 4/2015: EGD per Dr. Janis Magana. Esophageal varices. Seven bands placed. Portal hypertensive gastropathy. St esophagus. Will continue EGDs every four months. Can not perform HALO procedure until varices completley obliterated. 9/2015:  EGD per Dr. Janis Magana. Grade 1 varices. Four bands applied. Repeat in 6 months. If varices obliterated will perform Halo. 9/2016:  EGD per Dr. Margie Malone. Small varices in mid esophagus. Distal esophagus with several columns of St's as previously noted. Banding of this mucosa was attempted but bands would not apply. The mucosa was friable and started to bleed actively. 1:10:000 epinephrine was injected (5 ml) at various bleeding sites.  This did not control the bleeding. Gold probe cautery was then applies to the bleeding sites and hemostasis was achieved. 11/2016:  EGD with HALO per Dr. Ji Kurtz. [place] colored mucosa was seen at 34 cm and extended upto 38 cm. No nodules or ulceration was seen. Scarring from previous band ligation of varices was seen. No varices were seen. Small sliding hiatal hernia was seen. Stomach: Streaky erythema was seen in antrum and was radiating towards the pylorus. No gastric varices were seen Duodenum: normal  2/2017:  EGD with HALO per Dr. Ji Kurtz. Repeat in 3 months. 05/2017. EGD by Dr. Amanda Vyas. HALO for St's. Repeat in 3 months. 08/2017. EGD by Dr. Amanda Vyas. HALO for St's. Repeat in 3 months. RADIOLOGY  1/2009:  Ultrasound liver - echogenic consistent with cirrhosis, no liver mass lesions, no ascites. 11/2010:  Abdominal ultrasound - no focal liver lesions noted, no ascites. 03/2012:  Ultrasound of the liver. Echogenic consistent with chronic liver disease, cirrhosis. No liver mass lesions. No ascites. 09/2012: Ultrasound of the liver. Echogenic consistent with chronic liver disease, cirrhosis. No liver mass lesions. No ascites. 2/12:  Chest x-ray PA and Lateral:  Normal heart size without consolidation. Band of atelectasis or scarring noted in the retrosternal region. There is blunting on the right CP angle due to small effusion. 2/2013:  Ultrasound of liver. Echogenic consistent with cirrhosis. 2.5 x 1.7 lesion in left lobe. Appears larger than on previous US. No dilated bile ducts. No ascites. 2/2013. MRI abdomen. No contrast given. Changes consistent with cirrhosis. No liver mass lesions. No dilated bile ducts. No bile duct strictures. No ascites. 03/2013 - Triple phase CT of abdomen. Hypervascular mass in left lobe stable and consistent with hemangioma. Stable non enhancing mass in segment 4/5. Large right pleural effusion. 08/2013: Ultrasound of the liver. Echogenic consistent with cirrhosis. No ascites. No bile duct strictures. 4.5 x 1.9 x 2.1 hyperechoic circumscribed structure similar to to CT which indicated hemangioma. 02/2014: The previously documented hemangioma within the subcapsular left hepatic lobe  is not identified on this exam. Hyperechoic mass within segment V/IV is identified, shows some interval increase since comparison ultrasound (maximum diameter currently 3.2 cm, previously 2.5 cm) but prior MRI suggests this represents a regenerative nodule. Second hyperechoic masslike area is now seen at subcapsular anterior right hepatic lobe measuring 3 cm maximally. This lesion is not clearly identified on prior ultrasound or MRI. Regenerative nodule is certainly possible but the finding is nonspecific.  04/2014:  Abdominal MRI. 2 cm lesion in the right hepatic lobe demonstrating faint arterial enhancement and early washout, concerning for hepatocellular carcinoma. 10/2014: Dynamic CT of the abdomen. Cirrhosis of the liver and evidence for portal hypertension with hepatomegaly and varices. Probable hemangioma although now with slightly atypical appearance in the left lobe of the liver lateral segment is not significantly changed in the interval in size as far back as 2011. This is an atypical appearance and behavior for hepatocellular carcinoma. 3. Probable focal fibrosis or regenerative nodule in the interlobar fissure. 3/2015: Ultrasound of the liver. A relatively discrete nodule is present in the right lobe measuring 3.2 x 2.2 x 2.2 cm. This is visible in retrospect on the prior study of 2014 February and is not hypervascular or well-defined on the following CT and MR studies. 11/2015: Abdominal ultrasound: Nodule in right hepatic lobe unchanged from previous imaging. No new lesions. No ascites. 01/2016: Ultrasound of liver. Echogenic consistent with cirrhosis. No liver mass lesions. No dilated bile ducts. No ascites.   9/2016: Ultrasound of the liver. Heterogeneous echotexture and lobulated contour of the liver, consistent with reported cirrhosis. Echogenic right hepatic mass is grossly stable given slight differences in technique/positioning. No new hepatic mass was seen. Additional previously seen left hepatic mass is not visualized on today's exam.  04/2017. Ultrasound of the liver. Redemonstrated heterogeneous echotexture and lobulated contour of the liver, compatible with history of cirrhosis. Stable echogenic mass in the right hepatic lobe. No new mass identified. Previously seen mass in the left hepatic lobe on prior CT scan remains inconspicuous by ultrasound. 11/2017. Abdominal MRI w/wo contrast.  Hepatic cirrhosis. Li-RADS 4 lesion in hepatic segment 3 (probable hepatocellular carcinoma). Faint arterial phase hyperenhancement throughout the left hepatic lobe. While infiltrative lesion not absolutely excluded, suspect finding reflects intralesional shunting related to the lesion. Scattered additional Li-RADS 3 hepatic lesions (indeterminate).     LIVER TRANSPLANT EVALUATION TESTING  2/2013. Blood ETOH positive. 2/2013. TSH 2.8, T3 112, T4 free 2.0  2/103. PSA 0.5  2/103. TB Quantiferon negative  2/2013. Blood type O+  2/2013. ECHO. Normal wall motion. LVEF 65%. No valve abnormalities. Trivial TR.  2/2013. FEV1 108% of predicted, FEV1% 103% of predicted, DLCO **% of predicted. 2/2013. Lexicon stress test.  LVEF 74%. Fixed inferior defect with normal wall motion. Small reversible defect at apex and in distal inferolateral apical wall.  02/2013:  DEXA scan - Osteopenia. 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 The most recent laboratory studies indicate that the liver transaminases are normal, alkaline phosphatase is elevated, tests of hepatic synthetic and metabolic function are normal, and the platelet count is depressed.  Will perform laboratory testing to monitor liver function and degree of liver injury. This will include hepatic panel, a CBC w/ diff, a BMP, a PT/INR, and an AFP-L3%. Complications of cirrhosis were discussed in detail. We discussed thrombocytopenia, portal hypertension, varices, GI bleeding, peripheral edema, ascites, hepatic encephalopathy, and hepatocellular carcinoma. We discussed the need for follow ups on a regular basis, at 3 month intervals to monitor for complications. We discussed the need for every 6 month liver imaging studies. Chronic HCV, genotype 1. He completed twelve weeks of treatment with simeprevir and sofosbuvir in June 2014. He had a SVR 2 years post treatment. Peripheral edema. Resolved. Esophageal varicies without prior bleeding. He has had repeat EGDs with HALO procedure performed. No varices identified. He returns to Dr. Joo Negron on 02/07/2018    Hepatic encephalopathy has not developed to date. There is no need for treatment with lactulose and/or xifaxan at this time. ETOH abuse. Continue AA. The patient had a postive thalium stress test with reversible ischemia. He has a cardiac catheterization. No significant ASCAD identified. Thrombocytopenia secondary to cirrhosis. There is no evidence of overt bleeding. There is no indication for platelet transfusions or pharmacologic treatment to increase the platelet count. [address] screening. Recent imaging suggested that Mr. Rodriguez Auguste had developed [address] He was referred to Dr. Johnathon Fallon for evaluation. The LI-RADS 4 lesion was biopsied and determined to be a hemangioma. We will continue advance imaging, either MRI or CT, every 6 months moving forward. All of the above issues were discussed with the patient. All questions were answered. The patient expressed a clear understanding of the above. 25 minutes total time spent with this patient with more than 50% of this time spent counseling and coordinating care as described above.      Follow-up Liver [place] 12 in 12 weeks.        Radha Shrestha NP  Liver [place] [facility] [address], [address] [place] [address] Otilia Valles, [address] [facility]   [phone]

## 2018-01-30 NOTE — MR AVS SNAPSHOT
[address] [place] [address] [address] [place] [address] 
[phone] Patient: Santos Pabon MRN: [id number] :1955 Visit Information Date & Time Provider Department Dept. Phone Encounter #  
 2018  9:00 AM GAIL Kurtz [address] of  [address] [id number] Follow-up Instructions Return in about 3 months (around 2018). Upcoming Health Maintenance Date Due HEMOGLOBIN A1C Q6M 1955 FOOT EXAM Q1 1964 MICROALBUMIN Q1 1964 EYE EXAM RETINAL OR DILATED Q1 1964 Pneumococcal 19-64 Highest Risk (1 of 3 - PCV13) 1973 DTaP/Tdap/Td series (1 - Tdap) 1975 FOBT Q 1 YEAR AGE 50-75 2004 LIPID PANEL Q1 3/28/2014 ZOSTER VACCINE AGE 60> 10/28/2014 Influenza Age 5 to Adult 2017 Allergies as of 2018  Review Complete On: 2018 By: Broderick Prieto No Known Allergies Current Immunizations  Never Reviewed No immunizations on file. Not reviewed this visit You Were Diagnosed With   
  
 Codes Comments Alcoholic cirrhosis of liver without ascites ([facility]    -  Primary ICD-10-CM: K70.30 ICD-9-CM: 571.2 Vitals BP Pulse Temp Resp Height(growth percentile) Weight(growth percentile) 147/89 (BP 1 Location: Right arm, BP Patient Position: Sitting) 62 98.6 °F (37 °C) (Tympanic) 18 5' 10\" (1.778 m) 229 lb (103.9 kg) SpO2 BMI Smoking Status 98% 32.86 kg/m2 Former Smoker Vitals History BMI and BSA Data Body Mass Index Body Surface Area  
 32.86 kg/m 2 2.27 m 2 Preferred Pharmacy Pharmacy Name Phone [phone] [address] [phone] Your Updated Medication List  
  
   
This list is accurate as of: 18  9:13 AM.  Always use your most recent med list.  
  
  
  
  
 glipiZIDE SR 10 mg CR tablet Commonly known as:  GLUCOTROL XL Take 10 mg by mouth two (2) times a day. Indications: type 2 diabetes mellitus LIPITOR 10 mg tablet Generic drug:  atorvastatin Take 10 mg by mouth every evening. lisinopril 5 mg tablet Commonly known as:  Cleotis Zhang Take 5 mg by mouth daily. metoprolol succinate 50 mg XL tablet Commonly known as:  TOPROL-XL Take 50 mg by mouth daily. Indications: HYPERTENSION  
  
 multivitamin tablet Commonly known as:  ONE A DAY Take 1 Tab by mouth daily. ONGLYZA 5 mg Tab tablet Generic drug:  sAXagliptin Take 5 mg by mouth daily. PRILOSEC PO Take 20 mg by mouth daily. Indications: GERD  
  
 terazosin 5 mg capsule Commonly known as:  HYTRIN Take 5 mg by mouth daily. testosterone 30 mg/actuation (1.5 mL) Slpm  
by TransDERmal route. Follow-up Instructions Return in about 3 months (around 4/30/2018). Introducing [facility]! Dear Julaine Brunner: Thank you for requesting a [employer] account. Our records indicate that you already have an active [employer] account. You can access your account anytime at [email or web address] Did you know that you can access your hospital and ER discharge instructions at any time in [employer]? You can also review all of your test results from your hospital stay or ER visit. Additional Information If you have questions, please visit the Frequently Asked Questions section of the [employer] website at [email or web address] Remember, [employer] is NOT to be used for urgent needs. For medical emergencies, dial 911. Now available from your iPhone and Android! Please provide this summary of care documentation to your next provider. Your primary care clinician is listed as VENITA PARKS. If you have any questions after today's visit, please call [phone].

## 2018-01-30 NOTE — PROGRESS NOTES
1. Have you been to the ER, urgent care clinic since your last visit? Hospitalized since your last visit? No    2. Have you seen or consulted any other health care providers outside of the [address] [place] [address] since your last visit? Include any pap smears or colon screening.  No

## 2018-02-01 LAB
AFP L3 MFR SERPL: 7.2 % (ref 0–9.9)
AFP SERPL-MCNC: 7.6 NG/ML (ref 0–8)

## 2018-02-07 ENCOUNTER — ANESTHESIA EVENT (OUTPATIENT)
Dept: ENDOSCOPY | Age: 63
End: 2018-02-07
Payer: COMMERCIAL

## 2018-02-07 ENCOUNTER — HOSPITAL ENCOUNTER (OUTPATIENT)
Age: 63
Setting detail: OUTPATIENT SURGERY
Discharge: HOME | End: 2018-02-07
Attending: INTERNAL MEDICINE | Admitting: INTERNAL MEDICINE
Payer: COMMERCIAL

## 2018-02-07 ENCOUNTER — ANESTHESIA (OUTPATIENT)
Dept: ENDOSCOPY | Age: 63
End: 2018-02-07
Payer: COMMERCIAL

## 2018-02-07 VITALS
RESPIRATION RATE: 20 BRPM | OXYGEN SATURATION: 98 % | DIASTOLIC BLOOD PRESSURE: 74 MMHG | SYSTOLIC BLOOD PRESSURE: 122 MMHG | TEMPERATURE: 97.9 F | HEART RATE: 75 BPM

## 2018-02-07 PROCEDURE — 76060000031 HC ANESTHESIA FIRST 0.5 HR: Performed by: INTERNAL MEDICINE

## 2018-02-07 PROCEDURE — 77030011223 HC DEV LIG POLYLP OCOA -B: Performed by: INTERNAL MEDICINE

## 2018-02-07 PROCEDURE — 74011250636 HC RX REV CODE- 250/636

## 2018-02-07 PROCEDURE — 77030014243 HC BND LIG VRCES BSC -D: Performed by: INTERNAL MEDICINE

## 2018-02-07 PROCEDURE — 76040000019: Performed by: INTERNAL MEDICINE

## 2018-02-07 PROCEDURE — 74011000250 HC RX REV CODE- 250

## 2018-02-07 RX ORDER — DEXTROMETHORPHAN/PSEUDOEPHED 2.5-7.5/.8
1.2 DROPS ORAL
Status: DISCONTINUED | OUTPATIENT
Start: 2018-02-07 | End: 2018-02-07 | Stop reason: HOSPADM

## 2018-02-07 RX ORDER — FENTANYL CITRATE 50 UG/ML
100 INJECTION, SOLUTION INTRAMUSCULAR; INTRAVENOUS
Status: DISCONTINUED | OUTPATIENT
Start: 2018-02-07 | End: 2018-02-07 | Stop reason: HOSPADM

## 2018-02-07 RX ORDER — SODIUM CHLORIDE 0.9 % (FLUSH) 0.9 %
5-10 SYRINGE (ML) INJECTION EVERY 8 HOURS
Status: DISCONTINUED | OUTPATIENT
Start: 2018-02-07 | End: 2018-02-07 | Stop reason: HOSPADM

## 2018-02-07 RX ORDER — SODIUM CHLORIDE 0.9 % (FLUSH) 0.9 %
5-10 SYRINGE (ML) INJECTION AS NEEDED
Status: DISCONTINUED | OUTPATIENT
Start: 2018-02-07 | End: 2018-02-07 | Stop reason: HOSPADM

## 2018-02-07 RX ORDER — LIDOCAINE HYDROCHLORIDE 20 MG/ML
INJECTION, SOLUTION EPIDURAL; INFILTRATION; INTRACAUDAL; PERINEURAL AS NEEDED
Status: DISCONTINUED | OUTPATIENT
Start: 2018-02-07 | End: 2018-02-07 | Stop reason: HOSPADM

## 2018-02-07 RX ORDER — SODIUM CHLORIDE 9 MG/ML
50 INJECTION, SOLUTION INTRAVENOUS CONTINUOUS
Status: DISCONTINUED | OUTPATIENT
Start: 2018-02-07 | End: 2018-02-07 | Stop reason: HOSPADM

## 2018-02-07 RX ORDER — SODIUM CHLORIDE 9 MG/ML
INJECTION, SOLUTION INTRAVENOUS
Status: DISCONTINUED | OUTPATIENT
Start: 2018-02-07 | End: 2018-02-07 | Stop reason: HOSPADM

## 2018-02-07 RX ORDER — NALOXONE HYDROCHLORIDE 0.4 MG/ML
0.4 INJECTION, SOLUTION INTRAMUSCULAR; INTRAVENOUS; SUBCUTANEOUS
Status: DISCONTINUED | OUTPATIENT
Start: 2018-02-07 | End: 2018-02-07 | Stop reason: HOSPADM

## 2018-02-07 RX ORDER — FLUMAZENIL 0.1 MG/ML
0.2 INJECTION INTRAVENOUS
Status: DISCONTINUED | OUTPATIENT
Start: 2018-02-07 | End: 2018-02-07 | Stop reason: HOSPADM

## 2018-02-07 RX ORDER — MIDAZOLAM HYDROCHLORIDE 1 MG/ML
.25-1 INJECTION, SOLUTION INTRAMUSCULAR; INTRAVENOUS
Status: DISCONTINUED | OUTPATIENT
Start: 2018-02-07 | End: 2018-02-07 | Stop reason: HOSPADM

## 2018-02-07 RX ORDER — PROPOFOL 10 MG/ML
INJECTION, EMULSION INTRAVENOUS AS NEEDED
Status: DISCONTINUED | OUTPATIENT
Start: 2018-02-07 | End: 2018-02-07 | Stop reason: HOSPADM

## 2018-02-07 RX ORDER — ATROPINE SULFATE 0.1 MG/ML
0.5 INJECTION INTRAVENOUS
Status: DISCONTINUED | OUTPATIENT
Start: 2018-02-07 | End: 2018-02-07 | Stop reason: HOSPADM

## 2018-02-07 RX ORDER — EPINEPHRINE 0.1 MG/ML
1 INJECTION INTRACARDIAC; INTRAVENOUS
Status: DISCONTINUED | OUTPATIENT
Start: 2018-02-07 | End: 2018-02-07 | Stop reason: HOSPADM

## 2018-02-07 RX ADMIN — PROPOFOL 50 MG: 10 INJECTION, EMULSION INTRAVENOUS at 10:53

## 2018-02-07 RX ADMIN — SODIUM CHLORIDE: 9 INJECTION, SOLUTION INTRAVENOUS at 10:21

## 2018-02-07 RX ADMIN — PROPOFOL 50 MG: 10 INJECTION, EMULSION INTRAVENOUS at 10:50

## 2018-02-07 RX ADMIN — LIDOCAINE HYDROCHLORIDE 100 MG: 20 INJECTION, SOLUTION EPIDURAL; INFILTRATION; INTRACAUDAL; PERINEURAL at 10:47

## 2018-02-07 RX ADMIN — PROPOFOL 50 MG: 10 INJECTION, EMULSION INTRAVENOUS at 10:51

## 2018-02-07 RX ADMIN — PROPOFOL 50 MG: 10 INJECTION, EMULSION INTRAVENOUS at 10:47

## 2018-02-07 RX ADMIN — PROPOFOL 50 MG: 10 INJECTION, EMULSION INTRAVENOUS at 10:49

## 2018-02-07 NOTE — DISCHARGE INSTRUCTIONS
[address] [place] [facility] [address] [facility]  [phone]                     DISCHARGE INSTRUCTIONS    Yury Hernandez  [id number]  1955    DISCOMFORT:  Sore throat- throat lozenges or warm salt water gargle  redness at IV site- apply warm compress to area; if redness or soreness persist- contact your physician  Gaseous discomfort- walking, belching will help relieve any discomfort  You may not operate a vehicle for 12 hours  You may not engage in an occupation involving machinery or appliances for rest of today  You may not drink alcoholic beverages for at least 12 hours  Avoid making any critical decisions for at least 24 hour  DIET  You may eat and drink after you leave. You may resume your regular diet - however -  remember your colon is empty and a heavy meal will produce gas. Avoid these foods:  vegetables, fried / greasy foods, carbonated drinks    ACTIVITY  You may resume your normal daily activities   Spend the remainder of the day resting -  avoid any strenuous activity. CALL M.D. ANY SIGN OF   Increasing pain, nausea, vomiting  Abdominal distension (swelling)  New increased bleeding (oral or rectal)  Fever (chills)  Pain in chest area  Bloody discharge from nose or mouth  Shortness of breath    Follow-up Instructions:   Call Dr. Maranda Martinez for any questions or problems. If we took a biopsy please call the office within 2 weeks to discuss your                             pathology results. Telephone # [phone]        Continue same medications.

## 2018-02-07 NOTE — ANESTHESIA POSTPROCEDURE EVALUATION
Post-Anesthesia Evaluation and Assessment    Patient: Mihir Sevilla MRN: [id number]  SSN: [id number]    YOB: 1955  Age: 60 y.o. Sex: male       Cardiovascular Function/Vital Signs  Visit Vitals    /76    Pulse 73    Temp 36.6 °C (97.9 °F)    Resp 21    SpO2 97%       Patient is status post MAC anesthesia for Procedure(s):  ESOPHAGOGASTRODUODENOSCOPY (EGD) HALO   ENDOSCOPIC HALO ABLATION  ENDOSCOPIC BANDING OR LIGATION. Nausea/Vomiting: None    Postoperative hydration reviewed and adequate. Pain:  Pain Scale 1: Numeric (0 - 10) (02/07/18 1113)  Pain Intensity 1: 0 (02/07/18 1113)   Managed    Neurological Status: At baseline    Mental Status and Level of Consciousness: Arousable    Pulmonary Status:   O2 Device: Room air (02/07/18 1113)   Adequate oxygenation and airway patent    Complications related to anesthesia: None    Post-anesthesia assessment completed.  No concerns    Signed By: Noris Mckenzie DO     February 7, 2018

## 2018-02-07 NOTE — ROUTINE PROCESS
Chris Other  1955  [id number]    Situation:  Verbal report received from: Sera Kennedy  Procedure: Procedure(s):  ESOPHAGOGASTRODUODENOSCOPY (EGD) HALO   ENDOSCOPIC HALO ABLATION  ENDOSCOPIC BANDING OR LIGATION    Background:    Preoperative diagnosis:  HAGAN'S ESOPHAGUS   Postoperative diagnosis: esophageal varicies/Barrets /GAVE    :  Dr. Alfaro   Assistant(s): Endoscopy Technician-1: Roberto Ladd  Endoscopy RN-1: Loran Duane, RN    Specimens: * No specimens in log *  H. Pylori  no    Assessment:  Intra-procedure medications   Anesthesia gave intra-procedure sedation and medications, see anesthesia flow sheet yes    Intravenous fluids: NS@ KVO     Vital signs stable     Abdominal assessment: round and firm. Recommendation:  Discharge patient per MD order.   Family or Friend Spouse  Permission to share finding with family or friend yes

## 2018-02-07 NOTE — ANESTHESIA PREPROCEDURE EVALUATION
Anesthetic History   No history of anesthetic complications            Review of Systems / Medical History  Patient summary reviewed, nursing notes reviewed and pertinent labs reviewed    Pulmonary  Within defined limits                 Neuro/Psych   Within defined limits           Cardiovascular    Hypertension          CAD         GI/Hepatic/Renal     GERD      Liver disease     Endo/Other    Diabetes    Arthritis     Other Findings              Physical Exam    Airway  Mallampati: II  TM Distance: > 6 cm  Neck ROM: normal range of motion   Mouth opening: Normal     Cardiovascular  Regular rate and rhythm,  S1 and S2 normal,  no murmur, click, rub, or gallop             Dental  No notable dental hx       Pulmonary  Breath sounds clear to auscultation               Abdominal  GI exam deferred       Other Findings            Anesthetic Plan    ASA: 3  Anesthesia type: MAC          Induction: Intravenous  Anesthetic plan and risks discussed with: Patient

## 2018-02-07 NOTE — PROGRESS NOTES
Anesthesia reports 250mg Propofol,100mg Lidocaine and 200mL NS given during procedure. Received report from anesthesia staff on vital signs and status of patient.

## 2018-02-07 NOTE — PROCEDURES
[address] [facility].  [address] [facility] [address], [address]   [phone]                            NAME:  Karen Caballero   :   1955   MRN:   [id number]     Date/Time:  2018 11:03 AM    Esophagogastroduodenoscopy (EGD) Procedure Note    :  Pablo David MD    Referring Provider:  Betsy London MD    Anethesia/Sedation:  MAC anesthesia    Procedure Details     After infom consent was obtained for the procedure, with all risks and benefits of procedure explained the patient was taken to the endoscopy suite and placed in the left lateral decubitus position. Following sequential administration of sedation as per above, the [id number] gastroscope was inserted into the mouth and advanced under direct vision to second portion of the duodenum. A careful inspection was made as the gastroscope was withdrawn, including a retroflexed view of the proximal stomach; findings and interventions are described below. Findings:  Esophagus: Few small 3-4 mm salmon colored islands were seen at the 38 cm. No nodularity was seen. Grade 1 varices were seen. Stomach: Streaky erythema was noted in the antrum radiating towards the pylorus, consistent with gastric antral vascular ectasia  Duodenum/jejunum:normal      Therapies:  variceal ablation (including the St's mucosa) was performed with 2 of bands placed    Specimens: none           EBL: None    Complications:   None; patient tolerated the procedure well. Impression:    See Postoperative diagnosis above    Recommendations:  -Follow symptoms. , -No NSAIDS, -EGD with HALO/Banding in 1 year  -Follow up with Dr Merline Ellen as scheduled    Discharge disposition:  Home in the company of  when able to ambulate    Pablo David MD

## 2018-02-07 NOTE — H&P
[address] [facility] [address]  217 [facility] [address], [address]  [phone]                                History and Physical     NAME: Dawna Gurrola   :  1955   MRN:  [id number]     HPI:  The patient was seen and examined. Past Surgical History:   Procedure Laterality Date    HX CATARACT REMOVAL      bilateral    HX COLECTOMY      diverticulitis    HX COLOSTOMY      then reversable    HX GI  2010    endoscopy    HX GI      bowel resection - one surgery as of 3/11/2015    HX GI      EGD with halo    HX HEART CATHETERIZATION  3/28/2013    no stents    HX HERNIA REPAIR      3x ventral hernia, 1 testicular    HX ORTHOPAEDIC  1985    right wrist fracture (old injury) - and right carpal tunnel    HX ORTHOPAEDIC Right 2016    wrist surgery - fusion and second carpal tunnel     Past Medical History:   Diagnosis Date    St's esophagus 2011    CAD (coronary artery disease), native coronary artery     p/mLAD 60% ((FFR 0.84). Otherwise mild coronary artery disease.  Chronic hepatitis C without mention of hepatic coma, genotype 1 2011    Chronic pain     back    GERD (gastroesophageal reflux disease) 2011    H/O alcohol abuse     quit few months ago, particpates in [place] [address]    H/O tobacco use, presenting hazards to health     quit few months ago    History of echocardiogram 2013    EF 65%. No reg'l WMA. RVSP 25 mmHg.  History of myocardial perfusion scan 2013    Fixed inferior defect, likely artifact. Sm, mild reversible apical defect concerning for mild ischemia; could be apical thinning. Sm reversible distal inferolateral apical wall. Sm distal inferolateral, apical, & inferoapical defect possibly ischemia. EF 74%. No WMA.   Neg EKG on pharm stress test.    Hyperlipidemia 2011    Hypertension 20yrs+- 's    Liver disease     chronic hep c   -non detectable since     Liver disease     cirrhosis    OA (osteoarthritis)     Other ill-defined conditions(799.89)     triglycerides    S/P cardiac cath 03/28/2013    p/mLAD 60% ((FFR 0.84). Otherwise mild coronary artery disease. RA 9.  RV 48/14. PA 50/20. W 25. COCI:  6.5/3.2 (TD) and 6.8/3.2 (Ingrid).  Thrombocytopenia, secondary to cirrhosis 5/23/2011    Type II or unspecified type diabetes mellitus without mention of complication, not stated as uncontrolled 2007    Ventral hernia, unspecified, without mention of obstruction or gangrene 5/23/2011     Social History   Substance Use Topics    Smoking status: Former Smoker     Packs/day: 0.50     Years: 25.00     Quit date: 1/19/2016    Smokeless tobacco: Never Used    Alcohol use No     No Known Allergies  Family History   Problem Relation Age of Onset    Diabetes Mother     Hypertension Mother     Other Mother      Gout    Heart Failure Mother     Cancer Father      carcinoma left lung    Diabetes Sister     Heart Failure Sister     HIV/AIDS Brother      No current facility-administered medications for this encounter. Facility-Administered Medications Ordered in Other Encounters   Medication Dose Route Frequency    0.9% sodium chloride infusion   IntraVENous CONTINUOUS         PHYSICAL EXAM:  General: WD, WN. Alert, cooperative, no acute distress    HEENT: NC, Atraumatic. PERRLA, EOMI. Anicteric sclerae. Lungs:  CTA Bilaterally. No Wheezing/Rhonchi/Rales. Heart:  Regular  rhythm,  No murmur, No Rubs, No Gallops  Abdomen: Soft, Non distended, Non tender.  +Bowel sounds, no HSM  Extremities: No c/c/e  Neurologic:  CN 2-12 gi, Alert and oriented X 3. No acute neurological distress   Psych:   Good insight. Not anxious nor agitated. The heart, lungs and mental status were satisfactory for the administration of MAC sedation and for the procedure.       Mallampati score: 3       Assessment:   · St's esophagus  · Esophageal varices    Plan:   · Endoscopic procedure  · MAC sedation ·

## 2018-02-07 NOTE — IP AVS SNAPSHOT
[address] 
[phone] Patient: Amairani Evans MRN: [id number] :1955 About your hospitalization You were admitted on:  2018 You last received care in the:  [facility] ENDOSCOPY You were discharged on:  2018 Why you were hospitalized Your primary diagnosis was:  Not on File Follow-up Information None Discharge Orders None A check barbara indicates which time of day the medication should be taken. My Medications CONTINUE taking these medications Instructions Each Dose to Equal  
 Morning Noon Evening Bedtime  
 glipiZIDE SR 10 mg CR tablet Commonly known as:  GLUCOTROL XL Your last dose was: Your next dose is: Take 10 mg by mouth two (2) times a day. Indications: type 2 diabetes mellitus 10 mg  
    
   
   
   
  
 LIPITOR 10 mg tablet Generic drug:  atorvastatin Your last dose was: Your next dose is: Take 10 mg by mouth every evening. 10 mg  
    
   
   
   
  
 lisinopril 5 mg tablet Commonly known as:  Marlane Gabriel Your last dose was: Your next dose is: Take 5 mg by mouth daily. 5 mg  
    
   
   
   
  
 metoprolol succinate 50 mg XL tablet Commonly known as:  TOPROL-XL Your last dose was: Your next dose is: Take 50 mg by mouth daily. Indications: HYPERTENSION 50 mg  
    
   
   
   
  
 multivitamin tablet Commonly known as:  ONE A DAY Your last dose was: Your next dose is: Take 1 Tab by mouth daily. 1 Tab ONGLYZA 5 mg Tab tablet Generic drug:  sAXagliptin Your last dose was: Your next dose is: Take 5 mg by mouth daily. 5 mg PRILOSEC PO Your last dose was: Your next dose is: Take 20 mg by mouth daily. Indications: GERD 20 mg  
    
   
   
   
  
 terazosin 5 mg capsule Commonly known as:  HYTRIN Your last dose was: Your next dose is: Take 5 mg by mouth daily. 5 mg  
    
   
   
   
  
 testosterone 30 mg/actuation (1.5 mL) Slpm  
   
Your last dose was: Your next dose is:    
   
   
 by TransDERmal route. Discharge Instructions [address] [place] [facility] [address] [place], [address] 
[phone] DISCHARGE INSTRUCTIONS Troy Tadeo [id number] 
1955 DISCOMFORT: 
Sore throat- throat lozenges or warm salt water gargle 
redness at IV site- apply warm compress to area; if redness or soreness persist- contact your physician Gaseous discomfort- walking, belching will help relieve any discomfort You may not operate a vehicle for 12 hours You may not engage in an occupation involving machinery or appliances for rest of today You may not drink alcoholic beverages for at least 12 hours Avoid making any critical decisions for at least 24 hour DIET You may eat and drink after you leave. You may resume your regular diet  however -  remember your colon is empty and a heavy meal will produce gas. Avoid these foods:  vegetables, fried / greasy foods, carbonated drinks ACTIVITY You may resume your normal daily activities Spend the remainder of the day resting -  avoid any strenuous activity. CALL M.D. ANY SIGN OF Increasing pain, nausea, vomiting Abdominal distension (swelling) New increased bleeding (oral or rectal) Fever (chills) Pain in chest area Bloody discharge from nose or mouth Shortness of breath Follow-up Instructions: 
 Call Dr. Brent Vieira for any questions or problems. If we took a biopsy please call the office within 2 weeks to discuss your                             pathology results. Telephone # [phone] Continue same medications. Introducing [facility]! Dear Sarah Selby: Thank you for requesting a [employer] account. Our records indicate that you already have an active [employer] account. You can access your account anytime at [email or web address] Did you know that you can access your hospital and ER discharge instructions at any time in [employer]? You can also review all of your test results from your hospital stay or ER visit. Additional Information If you have questions, please visit the Frequently Asked Questions section of the [employer] website at [email or web address] Remember, [employer] is NOT to be used for urgent needs. For medical emergencies, dial 911. Now available from your iPhone and Android! Providers Seen During Your Hospitalization Provider Specialty Primary office phone Josie Johnston MD Gastroenterology [phone] Your Primary Care Physician (PCP) Primary Care Physician Office Phone Office Fax Emelia Ivan [phone] You are allergic to the following No active allergies Recent Documentation Smoking Status Former Smoker Emergency Contacts Name Discharge Info Relation Home Work Mobile [place] CAREGIVER [3] Spouse [3] [phone] Patient Belongings The following personal items are in your possession at time of discharge: 
  Dental Appliances: None  Visual Aid: None Please provide this summary of care documentation to your next provider. Signatures-by signing, you are acknowledging that this After Visit Summary has been reviewed with you and you have received a copy. Patient Signature:  ____________________________________________________________  Date:  ____________________________________________________________  
  
Maria M Mealing    
 Provider Signature:  ____________________________________________________________ Date:  ____________________________________________________________

## 2018-05-29 ENCOUNTER — HOSPITAL ENCOUNTER (OUTPATIENT)
Dept: LAB | Age: 63
Discharge: HOME | End: 2018-05-29
Payer: COMMERCIAL

## 2018-05-29 ENCOUNTER — OFFICE VISIT (OUTPATIENT)
Dept: HEMATOLOGY | Age: 63
End: 2018-05-29

## 2018-05-29 VITALS
TEMPERATURE: 98.7 F | OXYGEN SATURATION: 99 % | BODY MASS INDEX: 32.35 KG/M2 | HEART RATE: 60 BPM | DIASTOLIC BLOOD PRESSURE: 83 MMHG | SYSTOLIC BLOOD PRESSURE: 152 MMHG | WEIGHT: 226 LBS | RESPIRATION RATE: 18 BRPM | HEIGHT: 70 IN

## 2018-05-29 DIAGNOSIS — K74.60 CIRRHOSIS OF LIVER WITHOUT ASCITES, UNSPECIFIED HEPATIC CIRRHOSIS TYPE (HCC): Primary | ICD-10-CM

## 2018-05-29 DIAGNOSIS — K74.60 CIRRHOSIS OF LIVER WITHOUT ASCITES, UNSPECIFIED HEPATIC CIRRHOSIS TYPE (HCC): ICD-10-CM

## 2018-05-29 LAB
ALBUMIN SERPL-MCNC: 4.1 G/DL (ref 3.4–5)
ALBUMIN/GLOB SERPL: 1.1 {RATIO} (ref 0.8–1.7)
ALP SERPL-CCNC: 101 U/L (ref 45–117)
ALT SERPL-CCNC: 31 U/L (ref 16–61)
ANION GAP SERPL CALC-SCNC: 12 MMOL/L (ref 3–18)
AST SERPL-CCNC: 39 U/L (ref 15–37)
BASOPHILS # BLD: 0 K/UL (ref 0–0.06)
BASOPHILS NFR BLD: 0 % (ref 0–2)
BILIRUB DIRECT SERPL-MCNC: 0.2 MG/DL (ref 0–0.2)
BILIRUB SERPL-MCNC: 0.8 MG/DL (ref 0.2–1)
BUN SERPL-MCNC: 23 MG/DL (ref 7–18)
BUN/CREAT SERPL: 17 (ref 12–20)
CALCIUM SERPL-MCNC: 9.1 MG/DL (ref 8.5–10.1)
CHLORIDE SERPL-SCNC: 105 MMOL/L (ref 100–108)
CO2 SERPL-SCNC: 25 MMOL/L (ref 21–32)
CREAT SERPL-MCNC: 1.37 MG/DL (ref 0.6–1.3)
DIFFERENTIAL METHOD BLD: ABNORMAL
EOSINOPHIL # BLD: 0.1 K/UL (ref 0–0.4)
EOSINOPHIL NFR BLD: 4 % (ref 0–5)
ERYTHROCYTE [DISTWIDTH] IN BLOOD BY AUTOMATED COUNT: 15.6 % (ref 11.6–14.5)
GLOBULIN SER CALC-MCNC: 3.6 G/DL (ref 2–4)
GLUCOSE SERPL-MCNC: 117 MG/DL (ref 74–99)
HCT VFR BLD AUTO: 40 % (ref 36–48)
HGB BLD-MCNC: 12.7 G/DL (ref 13–16)
INR PPP: 1.2 (ref 0.8–1.2)
LYMPHOCYTES # BLD: 0.7 K/UL (ref 0.9–3.6)
LYMPHOCYTES NFR BLD: 21 % (ref 21–52)
MCH RBC QN AUTO: 26.7 PG (ref 24–34)
MCHC RBC AUTO-ENTMCNC: 31.8 G/DL (ref 31–37)
MCV RBC AUTO: 84 FL (ref 74–97)
MONOCYTES # BLD: 0.3 K/UL (ref 0.05–1.2)
MONOCYTES NFR BLD: 8 % (ref 3–10)
NEUTS SEG # BLD: 2.2 K/UL (ref 1.8–8)
NEUTS SEG NFR BLD: 67 % (ref 40–73)
PLATELET # BLD AUTO: 53 K/UL (ref 135–420)
PMV BLD AUTO: 12.3 FL (ref 9.2–11.8)
POTASSIUM SERPL-SCNC: 4.9 MMOL/L (ref 3.5–5.5)
PROT SERPL-MCNC: 7.7 G/DL (ref 6.4–8.2)
PROTHROMBIN TIME: 14.7 SEC (ref 11.5–15.2)
RBC # BLD AUTO: 4.76 M/UL (ref 4.7–5.5)
SODIUM SERPL-SCNC: 142 MMOL/L (ref 136–145)
WBC # BLD AUTO: 3.2 K/UL (ref 4.6–13.2)

## 2018-05-29 PROCEDURE — 87521 HEPATITIS C PROBE&RVRS TRNSC: CPT | Performed by: NURSE PRACTITIONER

## 2018-05-29 PROCEDURE — 36415 COLL VENOUS BLD VENIPUNCTURE: CPT | Performed by: NURSE PRACTITIONER

## 2018-05-29 PROCEDURE — 85610 PROTHROMBIN TIME: CPT | Performed by: NURSE PRACTITIONER

## 2018-05-29 PROCEDURE — 80048 BASIC METABOLIC PNL TOTAL CA: CPT | Performed by: NURSE PRACTITIONER

## 2018-05-29 PROCEDURE — 80076 HEPATIC FUNCTION PANEL: CPT | Performed by: NURSE PRACTITIONER

## 2018-05-29 PROCEDURE — 85025 COMPLETE CBC W/AUTO DIFF WBC: CPT | Performed by: NURSE PRACTITIONER

## 2018-05-29 PROCEDURE — 82107 ALPHA-FETOPROTEIN L3: CPT | Performed by: NURSE PRACTITIONER

## 2018-05-29 NOTE — PROGRESS NOTES
Roger Garcia is a 60 y.o. male      1. Have you been to the ER, urgent care clinic or hospitalized since your last visit? NO.     2. Have you seen or consulted any other health care providers outside of the [address] [place] [address] since your last visit (Include any pap smears or colon screening)?  NO          Learning Assessment 12/4/2017   PRIMARY LEARNER Patient   HIGHEST LEVEL OF EDUCATION - PRIMARY LEARNER  -   BARRIERS PRIMARY LEARNER NONE   CO-LEARNER CAREGIVER No   PRIMARY LANGUAGE ENGLISH   LEARNER PREFERENCE PRIMARY LISTENING     -     -   LEARNING SPECIAL TOPICS -   ANSWERED BY patient   RELATIONSHIP SELF

## 2018-05-29 NOTE — PROGRESS NOTES
[address], [address], [place], [place]       GAIL Cervantes PA-C Larene Pronto, [facility]   GAIL Rios NP        at [address]     [address] [place] [address], [address]     [phone]     FAX: [phone]    at [facility]     [address], [address], [address]     [phone]     FAX: [phone]       Patient Care Team:  Mallika Delong MD as PCP - General (Family Practice)  Alfredito Garcia MD as Consulting Provider (Orthopedic Surgery)  Rhonda Hill MD (Gastroenterology)  Karina Osman MD (Radiology)  Nona Castelan MD (Internal Medicine)  Scot Phan MD (Gastroenterology)        Problem List  Date Reviewed: 5/29/2018          Codes Class Noted    Type 2 diabetes mellitus with nephropathy [facility]) ICD-10-CM: E11.21  ICD-9-CM: 250.40, 583.81  1/30/2018        H/O alcohol abuse ICD-10-CM: Z87.898  ICD-9-CM: 305.03  Unknown    Overview Signed 4/27/2016  6:37 PM by Shana Castillo V     quit few months ago, particpates in [place] [address]             H/O tobacco use, presenting hazards to health ICD-10-CM: Z87.891  ICD-9-CM: V15.82  Unknown    Overview Signed 4/27/2016  6:37 PM by Shana Castillo V     quit few months ago             CAD (coronary artery disease), native coronary artery ICD-10-CM: I25.10  ICD-9-CM: 414.01  Unknown    Overview Addendum 1/3/2014  3:55 PM by Sahil Polanco V     p/mLAD 60% ((FFR 0.84). Otherwise mild coronary artery disease (march 2013).  LV EF 65% (echo feb 2013)             Hepatic encephalopathy (HCC) ICD-10-CM: K72.90  ICD-9-CM: 572.2  2/26/2013        CKD (chronic kidney disease) ICD-10-CM: N18.9  ICD-9-CM: 585.9  2/26/2013        Esophageal varices ([facility] ICD-10-CM: I85.00  ICD-9-CM: 456.1  2/26/2013        Cirrhosis ([facility] ICD-10-CM: K74.60  ICD-9-CM: 571.5  2/2/2013    Overview Signed 2/2/2013  5:22 PM by Candie De Guzman MD     Secondary to chronic HCV             Ascites ICD-10-CM: R18.8  ICD-9-CM: 789.59  2/2/2013        Hypertension ICD-10-CM: I10  ICD-9-CM: 401.9  5/23/2011        Ventral hernia, unspecified, without mention of obstruction or gangrene ICD-10-CM: K43.9  ICD-9-CM: 553.20  5/23/2011        Hyperlipidemia ICD-10-CM: E78.5  ICD-9-CM: 272.4  5/23/2011        Type II or unspecified type diabetes mellitus without mention of complication, not stated as uncontrolled ICD-10-CM: E11.9  ICD-9-CM: 250.00  5/23/2011        GERD (gastroesophageal reflux disease) ICD-10-CM: K21.9  ICD-9-CM: 530.81  5/23/2011        St's esophagus ICD-10-CM: K22.70  ICD-9-CM: 530.85  5/23/2011        Chronic hepatitis C ([facility] ICD-10-CM: B18.2  ICD-9-CM: 070.54  5/23/2011    Overview Signed 2/2/2013  5:24 PM by Candie De Guzman MD     Genotype 1  Peginterferon/ribavirin non-response as part of HALT-C clinical trial.    Peginterferon maintenance therapy during HALT-C clinical trial.   Conatus non-responder study. Discontinued secondary to joint pain. Peginterferon/Riba/Boceprevir. Treated for 12 weeks with significant anemia and thrombocytopenia. Non-responder. 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 The active problem list, all pertinent past medical history, medications, liver histology, endoscopic studies, radiologic findings and laboratory findings related to the liver disorder were reviewed with the patient. Ascites resolved. The patient has not developed hepatic encephalopathy. The patient has esophageal varices without bleeding. The patient has began, but has not completed liver transplant evaluation testing. The Thallium stress test demonstrated a fixed and 2 small reversible lesions.  Cardiac catheterization did not indicate the need for stenting. Mr. Martha Mark was noted to have a nodule on his thyroid while being worked up for a pleural effusion. He had a biopsy that was negative for malignancy. The patient had a LI-RADS 4 lesions from abdominal MRI performed in 11/2017. This was worked up and biopsied. Biopsy demonstrated that this lesion is a hemangioma. We have discussed the liver transplant process and various centers he could go to for liver transplantation. He and his wife would like to be seen at [place], [place] if this becomes neccessary. Mr. Martha Mark completed 12 weeks of treatment with sofosbuvir and simeprevir in June 2014. He had a SVR two years post treatment. Since Mr. Vinay Lucas last office visit he has been feeling well. He denies any further alcohol use since January 2016. He states that he continues to participate in [place]. Dr. Niko Jessica has performed HALO procedures for St esophagus. Dr. Niko Jessica continue to follow the patient. His last EGD was 02/07/2018. Variceal ablation (including the St's mucosa) was performed with 2 of bands placed. He will return to see Dr. Niko Jessica in 02/2019. Mr. Martha Mark is overall feeling well since his last office visit. His lower extremity edema has completely resolved and he is currently not taking any diuretics. He deneis any shortness of breath. He continues to struggle with arthralgias due to osteoarthritis. He does have difficulty completing daily activities due to arthritis. The patient has not experienced problems concentrating, swelling of the abdomen, swelling of the lower extremities, hematemesis, hematochezia. ALLERGIES:  No Known Allergies    Current Outpatient Prescriptions   Medication Sig    testosterone 30 mg/actuation (1.5 mL) slpm by TransDERmal route.  lisinopril (PRINIVIL, ZESTRIL) 5 mg tablet Take 5 mg by mouth daily.     terazosin (HYTRIN) 5 mg capsule Take 5 mg by mouth daily.  atorvastatin (LIPITOR) 10 mg tablet Take 10 mg by mouth every evening.  metoprolol succinate (TOPROL-XL) 50 mg XL tablet Take 50 mg by mouth daily. Indications: HYPERTENSION    glipiZIDE SR (GLUCOTROL) 10 mg CR tablet Take 10 mg by mouth two (2) times a day. Indications: type 2 diabetes mellitus    saxagliptin (ONGLYZA) 5 mg Tab tablet Take 5 mg by mouth daily.  multivitamin (ONE A DAY) tablet Take 1 Tab by mouth daily.  OMEPRAZOLE (PRILOSEC PO) Take 20 mg by mouth daily. Indications: GERD     No current facility-administered medications for this visit. SYSTEM REVIEW NOT RELATED TO LIVER DISEASE OR REVIEWED ABOVE:  Constitution systems: Negative for fever, chills, weight gain, weight loss. Eyes: Negative for visual changes. ENT: Negative for sore throat, painful swallowing. Respiratory: Negative for cough, hemoptysis, SOB. Cardiology: Negative for chest pain, palpitations. GI:  Negative for constipation or diarrhea. : Negative for urinary frequency, dysuria, hematuria, nocturia. Skin: Negative for rash. Hematology: Positive for easy bruising. Negative for blood clots. Musculo-skelatal: Positive for weakness. Neurologic: Negative for headaches, dizziness, vertigo, memory problems not related to HE. Psychology: Negative for anxiety, depression. FAMILY/SOCIAL HISTORY:  The patient is . The patient has 2 children. Stoped smoking 1/2016. History of heavy alcohol use. Last alcohol consumption was 1/2016. The patient retired in 2010. PHYSICAL EXAMINATION:    Visit Vitals    /83 (BP 1 Location: Right arm, BP Patient Position: Sitting)    Pulse 60    Temp 98.7 °F (37.1 °C) (Tympanic)    Resp 18    Ht 5' 10\" (1.778 m)    Wt 226 lb (102.5 kg)    SpO2 99%    BMI 32.43 kg/m2       PHYSICAL EXAMINATION:  VS: per nursing note  General: No acute distress. Eyes: Sclera anicteric. ENT: No oral lesions.   Thyroid normal.  Nodes: No adenopathy. Skin: No spider angiomata. No jaundice. No palmar erythema. Respiratory: Lungs clear to auscultation. Cardiovascular: Regular heart rate. No murmurs. No JVD. Abdomen: Soft non-tender, liver size normal to percussion/palpation. Spleen not palpable. No obvious ascites. Extremities: No edema. No muscle wasting. No gross arthritic changes. Neurologic: Alert and oriented. Cranial nerves grossly intact. No asterixis. LABORATORY:  [facility] [address] Units 1/30/2018 12/27/2017   WBC 4.6 - 13.2 K/uL 3.2 (L) 2.5 (L)   ANC 1.8 - 8.0 K/UL 2.3    HGB 13.0 - 16.0 g/dL 11.7 (L) 10.6 (L)    - 420 K/uL 68 (L) 58 (L)   INR 0.8 - 1.2   1.1 1.2   AST 15 - 37 U/L 19    ALT 16 - 61 U/L 24    Alk Phos 45 - 117 U/L 113    Bili, Total 0.2 - 1.0 MG/DL 0.5    Bili, Direct 0.0 - 0.2 MG/DL 0.2    Albumin 3.4 - 5.0 g/dL 3.8    BUN 7.0 - 18 MG/DL 25 (H) 31 (H)   Creat 0.6 - 1.3 MG/DL 1.18 1.30   Creat (iSTAT) 0.6 - 1.3 MG/DL     Na 136 - 145 mmol/L 140 142   K 3.5 - 5.5 mmol/L 4.8 4.4   Cl 100 - 108 mmol/L 103 111 (H)   CO2 21 - 32 mmol/L 27 24   Glucose 74 - 99 mg/dL 115 (H) 152 (H)     Cancer Screening Latest Ref Rng & Units 1/30/2018   AFP, Serum 0.0 - 8.0 ng/mL 7.6   AFP-L3% 0.0 - 9.9 % 7.2     Cancer Screening Latest Ref Rng & Units 12/4/2017 8/31/2017 3/1/2017   AFP, Serum 0.0 - 8.0 ng/mL 6.8 8.2 (H) 10.5 (H)   AFP-L3% 0.0 - 9.9 % 8.3 7.7 8.4     SEROLOGY  2/2012. Anti-HCV negative, CMV IgG positive, EBV IgG positive,     LIVER HISTOLOGY  12/2017. Liver mass biopsy. Hepatocytes show some mild steatosis. The lesion in this case is a hemangioma, and there are several fragments of it along with the liver tissue, characterized by a fibrous background and scattered simple, capillary-like vessels. The architecture is cavernous. There is no evidence of malignancy. ENDOSCOPIC PROCEDURES  11/2009:  EGD. Reported to demonstrate varices and Sts esophagus.     12/2010: EGD.  Grade I-II esophageal varices. 12/2010:  Colonoscopy - unremarkable. 3/2013. EGD by Dr Genetta Canavan. Report requested. 05/27/2014:  EGD per MLS. Multiple medium esophageal varices. Six bands placed. Barrets esophagus (bx negative for malignancy). Repeat in 6 weeks. 09/2014: EGD per MLS. Small esophageal varices. Mild portal hypertensive gastropathy. No gastric varices. Polyp biopsied in the duodenal bulb found to be carcinoid by pathology. Large segment of Barretts esophagus. Biopsy technologically difficult due to bleeding. 02/2015:  EGD per Dr. Talha Byrne. Esophageal varices. Seven bands placed. Portal hypertensive gastropathy. St esophagus. 3/2015: EGD per Dr. Talha Byrne. St esophagus. Portal hypertensive gastropathy. Varices. 4/2015: EGD per Dr. Talha Byrne. Esophageal varices. Seven bands placed. Portal hypertensive gastropathy. St esophagus. Will continue EGDs every four months. Can not perform HALO procedure until varices completley obliterated. 9/2015:  EGD per Dr. Talha Byrne. Grade 1 varices. Four bands applied. Repeat in 6 months. If varices obliterated will perform Halo. 9/2016:  EGD per Dr. Salas Huggins. Small varices in mid esophagus. Distal esophagus with several columns of St's as previously noted. Banding of this mucosa was attempted but bands would not apply. The mucosa was friable and started to bleed actively. 1:10:000 epinephrine was injected (5 ml) at various bleeding sites. This did not control the bleeding. Gold probe cautery was then applies to the bleeding sites and hemostasis was achieved. 11/2016:  EGD with HALO per Dr. Florence Durbin. Hastings colored mucosa was seen at 34 cm and extended upto 38 cm. No nodules or ulceration was seen. Scarring from previous band ligation of varices was seen. No varices were seen. Small sliding hiatal hernia was seen. Stomach: Streaky erythema was seen in antrum and was radiating towards the pylorus.  No gastric varices were seen Duodenum: normal  2/2017:  EGD with HALO per Dr. Mikayla Bui. Repeat in 3 months. 05/2017. EGD by Dr. Navin Sparks. HALO for St's. Repeat in 3 months. 08/2017. EGD by Dr. Navin Sparks. HALO for St's. Repeat in 3 months. 02/2018. EGD by Dr. Navin Sparks. Esophagus: Few small 3-4 mm salmon colored islands were seen at the 38 cm. No nodularity was seen. Grade 1 varices were seen. Stomach: Streaky erythema was noted in the antrum radiating towards the pylorus, consistent with gastric antral vascular ectasia. Duodenum/jejunum:normal.  Therapies:  variceal ablation (including the St's mucosa) was performed with 2 of bands placed. Repeat in one year. RADIOLOGY  1/2009:  Ultrasound liver - echogenic consistent with cirrhosis, no liver mass lesions, no ascites. 11/2010:  Abdominal ultrasound - no focal liver lesions noted, no ascites. 03/2012:  Ultrasound of the liver. Echogenic consistent with chronic liver disease, cirrhosis. No liver mass lesions. No ascites. 09/2012: Ultrasound of the liver. Echogenic consistent with chronic liver disease, cirrhosis. No liver mass lesions. No ascites. 2/12:  Chest x-ray PA and Lateral:  Normal heart size without consolidation. Band of atelectasis or scarring noted in the retrosternal region. There is blunting on the right CP angle due to small effusion. 2/2013:  Ultrasound of liver. Echogenic consistent with cirrhosis. 2.5 x 1.7 lesion in left lobe. Appears larger than on previous US. No dilated bile ducts. No ascites. 2/2013. MRI abdomen. No contrast given. Changes consistent with cirrhosis. No liver mass lesions. No dilated bile ducts. No bile duct strictures. No ascites. 03/2013 - Triple phase CT of abdomen. Hypervascular mass in left lobe stable and consistent with hemangioma. Stable non enhancing mass in segment 4/5. Large right pleural effusion. 08/2013: Ultrasound of the liver. Echogenic consistent with cirrhosis. No ascites. No bile duct strictures. 4.5 x 1.9 x 2.1 hyperechoic circumscribed structure similar to to CT which indicated hemangioma. 02/2014: The previously documented hemangioma within the subcapsular left hepatic lobe  is not identified on this exam. Hyperechoic mass within segment V/IV is identified, shows some interval increase since comparison ultrasound (maximum diameter currently 3.2 cm, previously 2.5 cm) but prior MRI suggests this represents a regenerative nodule. Second hyperechoic masslike area is now seen at subcapsular anterior right hepatic lobe measuring 3 cm maximally. This lesion is not clearly identified on prior ultrasound or MRI. Regenerative nodule is certainly possible but the finding is nonspecific.  04/2014:  Abdominal MRI. 2 cm lesion in the right hepatic lobe demonstrating faint arterial enhancement and early washout, concerning for hepatocellular carcinoma. 10/2014: Dynamic CT of the abdomen. Cirrhosis of the liver and evidence for portal hypertension with hepatomegaly and varices. Probable hemangioma although now with slightly atypical appearance in the left lobe of the liver lateral segment is not significantly changed in the interval in size as far back as 2011. This is an atypical appearance and behavior for hepatocellular carcinoma. 3. Probable focal fibrosis or regenerative nodule in the interlobar fissure. 3/2015: Ultrasound of the liver. A relatively discrete nodule is present in the right lobe measuring 3.2 x 2.2 x 2.2 cm. This is visible in retrospect on the prior study of 2014 February and is not hypervascular or well-defined on the following CT and MR studies. 11/2015: Abdominal ultrasound: Nodule in right hepatic lobe unchanged from previous imaging. No new lesions. No ascites. 01/2016: Ultrasound of liver. Echogenic consistent with cirrhosis. No liver mass lesions. No dilated bile ducts. No ascites. 9/2016: Ultrasound of the liver.  Heterogeneous echotexture and lobulated contour of the liver, consistent with reported cirrhosis. Echogenic right hepatic mass is grossly stable given slight differences in technique/positioning. No new hepatic mass was seen. Additional previously seen left hepatic mass is not visualized on today's exam.  04/2017. Ultrasound of the liver. Redemonstrated heterogeneous echotexture and lobulated contour of the liver, compatible with history of cirrhosis. Stable echogenic mass in the right hepatic lobe. No new mass identified. Previously seen mass in the left hepatic lobe on prior CT scan remains inconspicuous by ultrasound. 11/2017. Abdominal MRI w/wo contrast.  Hepatic cirrhosis. Li-RADS 4 lesion in hepatic segment 3 (probable hepatocellular carcinoma). Faint arterial phase hyperenhancement throughout the left hepatic lobe. While infiltrative lesion not absolutely excluded, suspect finding reflects intralesional shunting related to the lesion. Scattered additional Li-RADS 3 hepatic lesions (indeterminate). 12/2017. Abdominal CT w/wo contrast. Complex hypervascular lesion in the periphery of segment 3 of the left hepatic lobe as described and measured above. Minimal interval increase in size. LI-RADS 4. Developing hepatoma is most likely.     LIVER TRANSPLANT EVALUATION TESTING  2/2013. Blood ETOH positive. 2/2013. TSH 2.8, T3 112, T4 free 2.0  2/103. PSA 0.5  2/103. TB Quantiferon negative  2/2013. Blood type O+  2/2013. ECHO. Normal wall motion. LVEF 65%. No valve abnormalities. Trivial TR.  2/2013. FEV1 108% of predicted, FEV1% 103% of predicted, DLCO **% of predicted. 2/2013. Lexicon stress test.  LVEF 74%. Fixed inferior defect with normal wall motion. Small reversible defect at apex and in distal inferolateral apical wall.  02/2013:  DEXA scan - Osteopenia. 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    The most recent laboratory studies indicate that the liver transaminases are normal, alkaline phosphatase is elevated, tests of hepatic synthetic and metabolic function are normal, and the platelet count is depressed. Will perform laboratory testing to monitor liver function and degree of liver injury. This will include hepatic panel, a CBC w/ diff, a BMP, a PT/INR, and an AFP-L3%. Complications of cirrhosis were discussed in detail. We discussed thrombocytopenia, portal hypertension, varices, GI bleeding, peripheral edema, ascites, hepatic encephalopathy, and hepatocellular carcinoma. We discussed the need for follow ups on a regular basis, at 3 month intervals to monitor for complications. We discussed the need for every 6 month liver imaging studies. Chronic HCV, genotype 1. He completed twelve weeks of treatment with simeprevir and sofosbuvir in June 2014. He had a SVR 2 years post treatment. Peripheral edema. Resolved. Esophageal varicies without prior bleeding. He has had repeat EGDs with HALO procedure performed. No varices identified. He returns to Dr. Liv Bryan on 02/07/2018    Hepatic encephalopathy has not developed to date. There is no need for treatment with lactulose and/or xifaxan at this time. ETOH abuse. Continue AA. The patient had a postive thalium stress test with reversible ischemia. He has a cardiac catheterization. No significant ASCAD identified. Thrombocytopenia secondary to cirrhosis. There is no evidence of overt bleeding. There is no indication for platelet transfusions or pharmacologic treatment to increase the platelet count. [address] screening. Recent imaging suggested that Mr. Francisco Franco had developed [address] He was referred to Dr. Ruthie Mena for evaluation. The LI-RADS 4 lesion was biopsied and determined to be a hemangioma. The plan was to continue with advanced imaging, either MRI or CT, every 6 months moving forward. Unfortunately, this is very expensive.   We will therefore perform ultrasound alternating with advanced imaging at 6 month intervals. These, along with AFP's every 6 months, should suffice for [facility]. screenings. Ultrasound was ordered today. All of the above issues were discussed with the patient. All questions were answered. The patient expressed a clear understanding of the above. 25 minutes total time spent with this patient with more than 50% of this time spent counseling and coordinating care as described above. [address] [place] [address] in 24 weeks.        Estelle Carvajal NP  [facility] [address] [place] [address], [address] [place] [address] Nessa Moran, [address] [place] [address]   [phone]

## 2018-05-29 NOTE — MR AVS SNAPSHOT
[address] [place] [address] 
[phone] Patient: Jocelynn Pichardo MRN: [id number] :1955 Visit Information Date & Time Provider Department Dept. Phone Encounter #  
 2018 10:00 AM GAIL Huff [address] of  [address] [id number] Follow-up Instructions Return in about 6 months (around 2018). Upcoming Health Maintenance Date Due HEMOGLOBIN A1C Q6M 1955 FOOT EXAM Q1 1964 MICROALBUMIN Q1 1964 EYE EXAM RETINAL OR DILATED Q1 1964 Pneumococcal 19-64 Highest Risk (1 of 3 - PCV13) 1973 DTaP/Tdap/Td series (1 - Tdap) 1975 FOBT Q 1 YEAR AGE 50-75 2004 LIPID PANEL Q1 3/28/2014 ZOSTER VACCINE AGE 60> 10/28/2014 Influenza Age 5 to Adult 2018 Allergies as of 2018  Review Complete On: 2018 By: Mia Layton No Known Allergies Current Immunizations  Never Reviewed No immunizations on file. Not reviewed this visit You Were Diagnosed With   
  
 Codes Comments Cirrhosis of liver without ascites, unspecified hepatic cirrhosis type ([facility]    -  Primary ICD-10-CM: K74.60 ICD-9-CM: 571.5 Vitals BP Pulse Temp Resp Height(growth percentile) 152/83 (BP 1 Location: Right arm, BP Patient Position: Sitting) 60 98.7 °F (37.1 °C) (Tympanic) 18 5' 10\" (1.778 m) Weight(growth percentile) SpO2 BMI Smoking Status 226 lb (102.5 kg) 99% 32.43 kg/m2 Former Smoker BMI and BSA Data Body Mass Index Body Surface Area  
 32.43 kg/m 2 2.25 m 2 Preferred Pharmacy Pharmacy Name Phone [id number] [address] [place] [address] [phone] Your Updated Medication List  
  
   
This list is accurate as of 18 10:13 AM.  Always use your most recent med list.  
  
  
  
  
 glipiZIDE SR 10 mg CR tablet Commonly known as:  GLUCOTROL XL Take 10 mg by mouth two (2) times a day. Indications: type 2 diabetes mellitus LIPITOR 10 mg tablet Generic drug:  atorvastatin Take 10 mg by mouth every evening. lisinopril 5 mg tablet Commonly known as:  Digna Bills Take 5 mg by mouth daily. metoprolol succinate 50 mg XL tablet Commonly known as:  TOPROL-XL Take 50 mg by mouth daily. Indications: HYPERTENSION  
  
 multivitamin tablet Commonly known as:  ONE A DAY Take 1 Tab by mouth daily. ONGLYZA 5 mg Tab tablet Generic drug:  sAXagliptin Take 5 mg by mouth daily. PRILOSEC PO Take 20 mg by mouth daily. Indications: GERD  
  
 terazosin 5 mg capsule Commonly known as:  HYTRIN Take 5 mg by mouth daily. testosterone 30 mg/actuation (1.5 mL) Slpm  
by TransDERmal route. Follow-up Instructions Return in about 6 months (around 11/29/2018). To-Do List   
 05/29/2018 Lab:  AFP WITH AFP-L3% 05/29/2018 Lab:  CBC WITH AUTOMATED DIFF   
  
 05/29/2018 Lab:  HCV RNA BY ANAMARIA QL,RFLX TO QT   
  
 05/29/2018 Lab:  HEPATIC FUNCTION PANEL   
  
 05/29/2018 Lab:  METABOLIC PANEL, BASIC   
  
 05/29/2018 Lab:  PROTHROMBIN TIME + INR   
  
 05/29/2018 Imaging:  [facility]! Dear Cristofer Gallego: Thank you for requesting a [employer] account. Our records indicate that you already have an active [employer] account. You can access your account anytime at [email or web address] Did you know that you can access your hospital and ER discharge instructions at any time in [employer]? You can also review all of your test results from your hospital stay or ER visit. Additional Information If you have questions, please visit the Frequently Asked Questions section of the [employer] website at [email or web address] Remember, MyChart is NOT to be used for urgent needs. For medical emergencies, dial 911. Now available from your iPhone and Android! Please provide this summary of care documentation to your next provider. Your primary care clinician is listed as VENITA PARKS. If you have any questions after today's visit, please call [phone].

## 2018-05-30 LAB
AFP L3 MFR SERPL: 8.8 % (ref 0–9.9)
AFP SERPL-MCNC: 6.1 NG/ML (ref 0–8)

## 2018-06-01 LAB
HCV RNA SERPL NAA+PROBE-LOG IU: NOT DETECTED HCV LOG 10IU/ML
HCV RNA SERPL PROBE AMP-ACNC: NOT DETECTED HCVIU/ML
HCV RNA SERPL QL NAA+PROBE: NOT DETECTED

## 2018-06-07 ENCOUNTER — HOSPITAL ENCOUNTER (OUTPATIENT)
Dept: ULTRASOUND IMAGING | Age: 63
Discharge: HOME | End: 2018-06-07
Payer: COMMERCIAL

## 2018-06-07 DIAGNOSIS — K74.60 CIRRHOSIS OF LIVER WITHOUT ASCITES, UNSPECIFIED HEPATIC CIRRHOSIS TYPE (HCC): ICD-10-CM

## 2018-06-07 PROCEDURE — 76705 ECHO EXAM OF ABDOMEN: CPT

## 2018-06-12 NOTE — PROGRESS NOTES
Please let him know that his ultrasound is okay. Nothing suspicious. The hepatic mass is unchanged and is a hemangioma (benign finding). Thank you.

## 2018-11-28 ENCOUNTER — HOSPITAL ENCOUNTER (OUTPATIENT)
Dept: LAB | Age: 63
Discharge: HOME | End: 2018-11-28
Payer: COMMERCIAL

## 2018-11-28 ENCOUNTER — OFFICE VISIT (OUTPATIENT)
Dept: HEMATOLOGY | Age: 63
End: 2018-11-28

## 2018-11-28 VITALS
BODY MASS INDEX: 32.35 KG/M2 | HEIGHT: 70 IN | TEMPERATURE: 96.4 F | HEART RATE: 76 BPM | RESPIRATION RATE: 15 BRPM | WEIGHT: 226 LBS | DIASTOLIC BLOOD PRESSURE: 97 MMHG | SYSTOLIC BLOOD PRESSURE: 161 MMHG | OXYGEN SATURATION: 98 %

## 2018-11-28 DIAGNOSIS — K70.30 ALCOHOLIC CIRRHOSIS OF LIVER WITHOUT ASCITES (HCC): ICD-10-CM

## 2018-11-28 DIAGNOSIS — K70.30 ALCOHOLIC CIRRHOSIS OF LIVER WITHOUT ASCITES (HCC): Primary | ICD-10-CM

## 2018-11-28 LAB
ALBUMIN SERPL-MCNC: 3.8 G/DL (ref 3.4–5)
ALBUMIN/GLOB SERPL: 1 {RATIO} (ref 0.8–1.7)
ALP SERPL-CCNC: 105 U/L (ref 45–117)
ALT SERPL-CCNC: 26 U/L (ref 16–61)
ANION GAP SERPL CALC-SCNC: 8 MMOL/L (ref 3–18)
AST SERPL-CCNC: 21 U/L (ref 15–37)
BASOPHILS # BLD: 0 K/UL (ref 0–0.1)
BASOPHILS NFR BLD: 0 % (ref 0–3)
BILIRUB DIRECT SERPL-MCNC: 0.3 MG/DL (ref 0–0.2)
BILIRUB SERPL-MCNC: 0.7 MG/DL (ref 0.2–1)
BUN SERPL-MCNC: 31 MG/DL (ref 7–18)
BUN/CREAT SERPL: 23
CALCIUM SERPL-MCNC: 8.7 MG/DL (ref 8.5–10.1)
CHLORIDE SERPL-SCNC: 107 MMOL/L (ref 100–108)
CO2 SERPL-SCNC: 25 MMOL/L (ref 21–32)
CREAT SERPL-MCNC: 1.33 MG/DL (ref 0.6–1.3)
DIFFERENTIAL METHOD BLD: ABNORMAL
EOSINOPHIL # BLD: 0.1 K/UL (ref 0–0.4)
EOSINOPHIL NFR BLD: 2 % (ref 0–5)
ERYTHROCYTE [DISTWIDTH] IN BLOOD BY AUTOMATED COUNT: 14.5 % (ref 11.6–14.5)
GLOBULIN SER CALC-MCNC: 3.9 G/DL (ref 2–4)
GLUCOSE SERPL-MCNC: 124 MG/DL (ref 74–99)
HCT VFR BLD AUTO: 37 % (ref 36–48)
HGB BLD-MCNC: 12 G/DL (ref 13–16)
INR PPP: 1.1 (ref 0.8–1.2)
LYMPHOCYTES # BLD: 0.4 K/UL (ref 0.8–3.5)
LYMPHOCYTES NFR BLD: 14 % (ref 20–51)
MCH RBC QN AUTO: 26.8 PG (ref 24–34)
MCHC RBC AUTO-ENTMCNC: 32.4 G/DL (ref 31–37)
MCV RBC AUTO: 82.8 FL (ref 74–97)
METAMYELOCYTES NFR BLD MANUAL: 1 %
MONOCYTES # BLD: 0.3 K/UL (ref 0–1)
MONOCYTES NFR BLD: 10 % (ref 2–9)
NEUTS BAND NFR BLD MANUAL: 3 % (ref 0–5)
NEUTS SEG # BLD: 1.8 K/UL (ref 1.8–8)
NEUTS SEG NFR BLD: 70 % (ref 42–75)
PLATELET # BLD AUTO: 57 K/UL (ref 135–420)
PLATELET COMMENTS,PCOM: ABNORMAL
PMV BLD AUTO: 11.9 FL (ref 9.2–11.8)
POTASSIUM SERPL-SCNC: 4.3 MMOL/L (ref 3.5–5.5)
PROT SERPL-MCNC: 7.7 G/DL (ref 6.4–8.2)
PROTHROMBIN TIME: 14.1 SEC (ref 11.5–15.2)
RBC # BLD AUTO: 4.47 M/UL (ref 4.7–5.5)
RBC MORPH BLD: ABNORMAL
SODIUM SERPL-SCNC: 140 MMOL/L (ref 136–145)
WBC # BLD AUTO: 2.5 K/UL (ref 4.6–13.2)

## 2018-11-28 PROCEDURE — 36415 COLL VENOUS BLD VENIPUNCTURE: CPT

## 2018-11-28 PROCEDURE — 80076 HEPATIC FUNCTION PANEL: CPT

## 2018-11-28 PROCEDURE — 85610 PROTHROMBIN TIME: CPT

## 2018-11-28 PROCEDURE — 80048 BASIC METABOLIC PNL TOTAL CA: CPT

## 2018-11-28 PROCEDURE — 85025 COMPLETE CBC W/AUTO DIFF WBC: CPT

## 2018-11-28 PROCEDURE — 82107 ALPHA-FETOPROTEIN L3: CPT

## 2018-11-28 NOTE — PROGRESS NOTES
1. Have you been to the ER, urgent care clinic since your last visit? Hospitalized since your last visit? No    2. Have you seen or consulted any other health care providers outside of the [address] [place] [address] since your last visit? Include any pap smears or colon screening.  Yes When: 11/2018 PCP

## 2018-11-28 NOTE — PROGRESS NOTES
[address] [place], [place], [place], [place]       Alejandro Alicia, GAIL Tomlinson, DONITA Briggs, ABHAYP-BC   GAIL Ortiz NP        at [address], [address] [address]  [address].     [phone]     FAX: [phone]    at [address], [address]     [phone]     FAX: [phone]       Patient Care Team:  Vivian Crum. Jacob Lanier MD as PCP - General (Family Practice)  Kiesha Hernandez MD as Consulting Provider (Orthopedic Surgery)  Sumaya Lama MD (Gastroenterology)  Mery Lyn MD (Radiology)  Vilinda Rinne, MD (Internal Medicine)  Yumiko Fernandez MD (Gastroenterology)        Problem List  Date Reviewed: 11/28/2018          Codes Class Noted    Type 2 diabetes mellitus with nephropathy [facility]) ICD-10-CM: E11.21  ICD-9-CM: 250.40, 583.81  1/30/2018        H/O alcohol abuse ICD-10-CM: Z87.898  ICD-9-CM: 305.03  Unknown    Overview Signed 4/27/2016  6:37 PM by Sahil Nguyễn     quit few months ago, particpates in [place] [address]             H/O tobacco use, presenting hazards to health ICD-10-CM: Z87.891  ICD-9-CM: V15.82  Unknown    Overview Signed 4/27/2016  6:37 PM by Sahil Nguyễn     quit few months ago             CAD (coronary artery disease), native coronary artery ICD-10-CM: I25.10  ICD-9-CM: 414.01  Unknown    Overview Addendum 1/3/2014  3:55 PM by Sahil Polanco     p/mLAD 60% ((FFR 0.84). Otherwise mild coronary artery disease (march 2013).  LV EF 65% (echo feb 2013)             Hepatic encephalopathy (HCC) ICD-10-CM: K72.90  ICD-9-CM: 572.2  2/26/2013        CKD (chronic kidney disease) ICD-10-CM: N18.9  ICD-9-CM: 585.9  2/26/2013        Esophageal varices ([facility] ICD-10-CM: I85.00  ICD-9-CM: 456.1  2/26/2013        Cirrhosis ([facility] ICD-10-CM: K74.60  ICD-9-CM: 571.5  2/2/2013    Overview Signed 2/2/2013 5:22 PM by Dennis Hernandez MD     Secondary to chronic HCV             Ascites ICD-10-CM: R18.8  ICD-9-CM: 789.59  2/2/2013        Hypertension ICD-10-CM: I10  ICD-9-CM: 401.9  5/23/2011        Ventral hernia, unspecified, without mention of obstruction or gangrene ICD-10-CM: K43.9  ICD-9-CM: 553.20  5/23/2011        Hyperlipidemia ICD-10-CM: E78.5  ICD-9-CM: 272.4  5/23/2011        Type II or unspecified type diabetes mellitus without mention of complication, not stated as uncontrolled ICD-10-CM: E11.9  ICD-9-CM: 250.00  5/23/2011        GERD (gastroesophageal reflux disease) ICD-10-CM: K21.9  ICD-9-CM: 530.81  5/23/2011        St's esophagus ICD-10-CM: K22.70  ICD-9-CM: 530.85  5/23/2011        Chronic hepatitis C ([facility] ICD-10-CM: B18.2  ICD-9-CM: 070.54  5/23/2011    Overview Signed 2/2/2013  5:24 PM by Dennis Hernandez MD     Genotype 1  Peginterferon/ribavirin non-response as part of HALT-C clinical trial.    Peginterferon maintenance therapy during HALT-C clinical trial.   Conatus non-responder study. Discontinued secondary to joint pain. Peginterferon/Riba/Boceprevir. Treated for 12 weeks with significant anemia and thrombocytopenia. Non-responder. 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 The active problem list, all pertinent past medical history, medications, liver histology, endoscopic studies, radiologic findings and laboratory findings related to the liver disorder were reviewed with the patient. Ascites resolved. The patient has not developed hepatic encephalopathy. The patient has esophageal varices without bleeding. The patient has began, but has not completed liver transplant evaluation testing. The Thallium stress test demonstrated a fixed and 2 small reversible lesions. Cardiac catheterization did not indicate the need for stenting. Mr. Jaret Martines was noted to have a nodule on his thyroid while being worked up for a pleural effusion. He had a biopsy that was negative for malignancy. The patient had a LI-RADS 4 lesions from abdominal MRI performed in 11/2017. This was worked up and biopsied. Biopsy demonstrated that this lesion is a hemangioma. We have discussed the liver transplant process and various centers he could go to for liver transplantation. He and his wife would like to be seen at [facility] [place] if this becomes neccessary. Mr. Jaret Martines completed 12 weeks of treatment with sofosbuvir and simeprevir in June 2014. He had a SVR two years post treatment. Since Mr. Ashleigh Luna last office visit he has been feeling well. He denies any further alcohol use since January 2016. He states that he continues to participate in [place]. Dr. Anna Sarmiento has performed HALO procedures for St esophagus. Dr. Anna Sarmiento continue to follow the patient. His last EGD was 02/07/2018. Variceal ablation (including the St's mucosa) was performed with 2 of bands placed. He will return to see Dr. Anna Sarmiento in 02/2019. Mr. Jaret Martines is overall feeling well since his last office visit. His lower extremity edema has completely resolved and he is currently not taking any diuretics. He deneis any shortness of breath. He continues to struggle with arthralgias due to osteoarthritis. He does have difficulty completing daily activities due to arthritis. The patient has not experienced problems concentrating, swelling of the abdomen, swelling of the lower extremities, hematemesis, hematochezia. ALLERGIES:  No Known Allergies    Current Outpatient Medications   Medication Sig    testosterone 30 mg/actuation (1.5 mL) slpm by TransDERmal route.  lisinopril (PRINIVIL, ZESTRIL) 5 mg tablet Take 5 mg by mouth daily.     terazosin (HYTRIN) 5 mg capsule Take 5 mg by mouth daily.  atorvastatin (LIPITOR) 10 mg tablet Take 10 mg by mouth every evening.  metoprolol succinate (TOPROL-XL) 50 mg XL tablet Take 50 mg by mouth daily. Indications: HYPERTENSION    glipiZIDE SR (GLUCOTROL) 10 mg CR tablet Take 10 mg by mouth two (2) times a day. Indications: type 2 diabetes mellitus    saxagliptin (ONGLYZA) 5 mg Tab tablet Take 5 mg by mouth daily.  OMEPRAZOLE (PRILOSEC PO) Take 20 mg by mouth daily. Indications: GERD    multivitamin (ONE A DAY) tablet Take 1 Tab by mouth daily. No current facility-administered medications for this visit. SYSTEM REVIEW NOT RELATED TO LIVER DISEASE OR REVIEWED ABOVE:  Constitution systems: Negative for fever, chills, weight gain, weight loss. Eyes: Negative for visual changes. ENT: Negative for sore throat, painful swallowing. Respiratory: Negative for cough, hemoptysis, SOB. Cardiology: Negative for chest pain, palpitations. GI:  Negative for constipation or diarrhea. : Negative for urinary frequency, dysuria, hematuria, nocturia. Skin: Negative for rash. Hematology: Positive for easy bruising. Negative for blood clots. Musculo-skelatal: Negative for back pain, muscle pain, weakness. Neurologic: Negative for headaches, dizziness, vertigo, memory problems not related to HE. Psychology: Negative for anxiety, depression. FAMILY/SOCIAL HISTORY:  The patient is . The patient has 2 children. Stoped smoking 1/2016. History of heavy alcohol use. Last alcohol consumption was 1/2016. The patient retired in 2010. PHYSICAL EXAMINATION:    Visit Vitals  BP (!) 161/97 (BP 1 Location: Left arm, BP Patient Position: Sitting)   Pulse 76   Temp 96.4 °F (35.8 °C)   Resp 15   Ht 5' 10\" (1.778 m)   Wt 226 lb (102.5 kg)   SpO2 98%   BMI 32.43 kg/m²       PHYSICAL EXAMINATION:  VS: per nursing note  General: No acute distress. Eyes: Sclera anicteric. ENT: No oral lesions.   Thyroid normal.  Nodes: No adenopathy. Skin: No spider angiomata. No jaundice. No palmar erythema. Respiratory: Lungs clear to auscultation. Cardiovascular: Regular heart rate. No murmurs. No JVD. Abdomen: Soft non-tender, liver size normal to percussion/palpation. Spleen not palpable. No obvious ascites. Extremities: No edema. No muscle wasting. No gross arthritic changes. Neurologic: Alert and oriented. Cranial nerves grossly intact. No asterixis. LABORATORY:  Liver [place] of [address] Units 5/29/2018 1/30/2018   WBC 4.6 - 13.2 K/uL 3.2 (L) 3.2 (L)   ANC 1.8 - 8.0 K/UL 2.2 2.3   HGB 13.0 - 16.0 g/dL 12.7 (L) 11.7 (L)    - 420 K/uL 53 (L) 68 (L)   INR 0.8 - 1.2   1.2 1.1   AST 15 - 37 U/L 39 (H) 19   ALT 16 - 61 U/L 31 24   Alk Phos 45 - 117 U/L 101 113   Bili, Total 0.2 - 1.0 MG/DL 0.8 0.5   Bili, Direct 0.0 - 0.2 MG/DL 0.2 0.2   Albumin 3.4 - 5.0 g/dL 4.1 3.8   BUN 7.0 - 18 MG/DL 23 (H) 25 (H)   Creat 0.6 - 1.3 MG/DL 1.37 (H) 1.18   Creat (iSTAT) 0.6 - 1.3 MG/DL     Na 136 - 145 mmol/L 142 140   K 3.5 - 5.5 mmol/L 4.9 4.8   Cl 100 - 108 mmol/L 105 103   CO2 21 - 32 mmol/L 25 27   Glucose 74 - 99 mg/dL 117 (H) 115 (H)     Cancer Screening Latest Ref Rng & Units 5/29/2018 1/30/2018   AFP, Serum 0.0 - 8.0 ng/mL 6.1 7.6   AFP-L3% 0.0 - 9.9 % 8.8 7.2     SEROLOGY  2/2012. Anti-HCV negative, CMV IgG positive, EBV IgG positive,     LIVER HISTOLOGY  12/2017. Liver mass biopsy. Hepatocytes show some mild steatosis. The lesion in this case is a hemangioma, and there are several fragments of it along with the liver tissue, characterized by a fibrous background and scattered simple, capillary-like vessels. The architecture is cavernous. There is no evidence of malignancy. ENDOSCOPIC PROCEDURES  11/2009:  EGD. Reported to demonstrate varices and Sts esophagus. 12/2010:  EGD. Grade I-II esophageal varices. 12/2010:  Colonoscopy - unremarkable. 3/2013.   EGD by  David. Report requested. 05/27/2014:  EGD per MLS. Multiple medium esophageal varices. Six bands placed. Barrets esophagus (bx negative for malignancy). Repeat in 6 weeks. 09/2014: EGD per MLS. Small esophageal varices. Mild portal hypertensive gastropathy. No gastric varices. Polyp biopsied in the duodenal bulb found to be carcinoid by pathology. Large segment of Barretts esophagus. Biopsy technologically difficult due to bleeding. 02/2015:  EGD per Dr. Nicola Carreno. Esophageal varices. Seven bands placed. Portal hypertensive gastropathy. St esophagus. 3/2015: EGD per Dr. Nicola Carreno. St esophagus. Portal hypertensive gastropathy. Varices. 4/2015: EGD per Dr. Nicola Carreno. Esophageal varices. Seven bands placed. Portal hypertensive gastropathy. St esophagus. Will continue EGDs every four months. Can not perform HALO procedure until varices completley obliterated. 9/2015:  EGD per Dr. Nicola Carerno. Grade 1 varices. Four bands applied. Repeat in 6 months. If varices obliterated will perform Halo. 9/2016:  EGD per Dr. Daisy Mitchell. Small varices in mid esophagus. Distal esophagus with several columns of St's as previously noted. Banding of this mucosa was attempted but bands would not apply. The mucosa was friable and started to bleed actively. 1:10:000 epinephrine was injected (5 ml) at various bleeding sites. This did not control the bleeding. Gold probe cautery was then applies to the bleeding sites and hemostasis was achieved. 11/2016:  EGD with HALO per Dr. Linda Lazaro. [place] colored mucosa was seen at 34 cm and extended upto 38 cm. No nodules or ulceration was seen. Scarring from previous band ligation of varices was seen. No varices were seen. Small sliding hiatal hernia was seen. Stomach: Streaky erythema was seen in antrum and was radiating towards the pylorus. No gastric varices were seen Duodenum: normal  2/2017:  EGD with HALO per Dr. Linda Lazaro. Repeat in 3 months. 05/2017.   EGD by Dr. Louise Betancourt. HALO for St's. Repeat in 3 months. 08/2017. EGD by Dr. Louise Betancourt. HALO for St's. Repeat in 3 months. 02/2018. EGD by Dr. Louise Betancourt. Esophagus: Few small 3-4 mm salmon colored islands were seen at the 38 cm. No nodularity was seen. Grade 1 varices were seen. Stomach: Streaky erythema was noted in the antrum radiating towards the pylorus, consistent with gastric antral vascular ectasia. Duodenum/jejunum:normal.  Variceal ablation (including the St's mucosa) was performed with 2 of bands placed. Repeat in one year. RADIOLOGY  1/2009:  Ultrasound liver - echogenic consistent with cirrhosis, no liver mass lesions, no ascites. 11/2010:  Abdominal ultrasound - no focal liver lesions noted, no ascites. 03/2012:  Ultrasound of the liver. Echogenic consistent with chronic liver disease, cirrhosis. No liver mass lesions. No ascites. 09/2012: Ultrasound of the liver. Echogenic consistent with chronic liver disease, cirrhosis. No liver mass lesions. No ascites. 2/12:  Chest x-ray PA and Lateral:  Normal heart size without consolidation. Band of atelectasis or scarring noted in the retrosternal region. There is blunting on the right CP angle due to small effusion. 2/2013:  Ultrasound of liver. Echogenic consistent with cirrhosis. 2.5 x 1.7 lesion in left lobe. Appears larger than on previous US. No dilated bile ducts. No ascites. 2/2013. MRI abdomen. No contrast given. Changes consistent with cirrhosis. No liver mass lesions. No dilated bile ducts. No bile duct strictures. No ascites. 03/2013 - Triple phase CT of abdomen. Hypervascular mass in left lobe stable and consistent with hemangioma. Stable non enhancing mass in segment 4/5. Large right pleural effusion. 08/2013: Ultrasound of the liver. Echogenic consistent with cirrhosis. No ascites. No bile duct strictures.   4.5 x 1.9 x 2.1 hyperechoic circumscribed structure similar to to CT which indicated hemangioma. 02/2014: The previously documented hemangioma within the subcapsular left hepatic lobe  is not identified on this exam. Hyperechoic mass within segment V/IV is identified, shows some interval increase since comparison ultrasound (maximum diameter currently 3.2 cm, previously 2.5 cm) but prior MRI suggests this represents a regenerative nodule. Second hyperechoic masslike area is now seen at subcapsular anterior right hepatic lobe measuring 3 cm maximally. This lesion is not clearly identified on prior ultrasound or MRI. Regenerative nodule is certainly possible but the finding is nonspecific.  04/2014:  Abdominal MRI. 2 cm lesion in the right hepatic lobe demonstrating faint arterial enhancement and early washout, concerning for hepatocellular carcinoma. 10/2014: Dynamic CT of the abdomen. Cirrhosis of the liver and evidence for portal hypertension with hepatomegaly and varices. Probable hemangioma although now with slightly atypical appearance in the left lobe of the liver lateral segment is not significantly changed in the interval in size as far back as 2011. This is an atypical appearance and behavior for hepatocellular carcinoma. 3. Probable focal fibrosis or regenerative nodule in the interlobar fissure. 3/2015: Ultrasound of the liver. A relatively discrete nodule is present in the right lobe measuring 3.2 x 2.2 x 2.2 cm. This is visible in retrospect on the prior study of 2014 February and is not hypervascular or well-defined on the following CT and MR studies. 11/2015: Abdominal ultrasound: Nodule in right hepatic lobe unchanged from previous imaging. No new lesions. No ascites. 01/2016: Ultrasound of liver. Echogenic consistent with cirrhosis. No liver mass lesions. No dilated bile ducts. No ascites. 9/2016: Ultrasound of the liver. Heterogeneous echotexture and lobulated contour of the liver, consistent with reported cirrhosis.  Echogenic right hepatic mass is grossly stable given slight differences in technique/positioning. No new hepatic mass was seen. Additional previously seen left hepatic mass is not visualized on today's exam.  04/2017. Ultrasound of the liver. Redemonstrated heterogeneous echotexture and lobulated contour of the liver, compatible with history of cirrhosis. Stable echogenic mass in the right hepatic lobe. No new mass identified. Previously seen mass in the left hepatic lobe on prior CT scan remains inconspicuous by ultrasound. 11/2017. Abdominal MRI w/wo contrast.  Hepatic cirrhosis. Li-RADS 4 lesion in hepatic segment 3 (probable hepatocellular carcinoma). Faint arterial phase hyperenhancement throughout the left hepatic lobe. While infiltrative lesion not absolutely excluded, suspect finding reflects intralesional shunting related to the lesion. Scattered additional Li-RADS 3 hepatic lesions (indeterminate). 12/2017. Abdominal CT w/wo contrast. Complex hypervascular lesion in the periphery of segment 3 of the left hepatic lobe as described and measured above. Minimal interval increase in size. LI-RADS 4. Developing hepatoma is most likely. 06/2018. Ultrasound of the liver. Cirrhosis. Ovoid hyperechoic nodule in the right lobe, unchanged in the interval. Prior biopsy confirmed this as a hemangioma.     LIVER TRANSPLANT EVALUATION TESTING  2/2013. Blood ETOH positive. 2/2013. TSH 2.8, T3 112, T4 free 2.0  2/103. PSA 0.5  2/103. TB Quantiferon negative  2/2013. Blood type O+  2/2013. ECHO. Normal wall motion. LVEF 65%. No valve abnormalities. Trivial TR.  2/2013. FEV1 108% of predicted, FEV1% 103% of predicted, DLCO **% of predicted. 2/2013. Lexicon stress test.  LVEF 74%. Fixed inferior defect with normal wall motion. Small reversible defect at apex and in distal inferolateral apical wall.  02/2013:  DEXA scan - Osteopenia. 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    The most recent laboratory studies indicate that the liver transaminases are normal, alkaline phosphatase is elevated, tests of hepatic synthetic and metabolic function are normal, and the platelet count is depressed. Will perform laboratory testing to monitor liver function and degree of liver injury. This will include hepatic panel, a CBC w/ diff, a BMP, a PT/INR, and an AFP-L3%. Complications of cirrhosis were discussed in detail. We discussed thrombocytopenia, portal hypertension, varices, GI bleeding, peripheral edema, ascites, hepatic encephalopathy, and hepatocellular carcinoma. We discussed the need for follow ups on a regular basis to monitor for complications. We discussed the need for every 6 month liver imaging studies. Chronic HCV, genotype 1. He completed twelve weeks of treatment with simeprevir and sofosbuvir in June 2014. He had a SVR 2 years post treatment. Peripheral edema. Resolved. Esophageal varicies without prior bleeding. He has had repeat EGDs with HALO procedure performed. No varices identified. He returns to Dr. Ananda Burgos on 02/2019. Hepatic encephalopathy has not developed to date. There is no need for treatment with lactulose and/or xifaxan at this time. ETOH abuse. Continue AA. The patient had a postive thalium stress test with reversible ischemia. He has a cardiac catheterization. No significant ASCAD identified. Thrombocytopenia secondary to cirrhosis. There is no evidence of overt bleeding. There is no indication for platelet transfusions or pharmacologic treatment to increase the platelet count. [address] screening. Recent imaging suggested that Mr. Charlie Zamora had developed [address] He was referred to Dr. Nevin Garcia for evaluation. The LI-RADS 4 lesion was biopsied and determined to be a hemangioma. The plan was to continue with advanced imaging, either MRI or CT, every 6 months moving forward. Unfortunately, this is very expensive.   We will therefore perform ultrasound alternating with advanced imaging at 6 month intervals. These, along with AFP's every 6 months, should suffice for [facility] screenings. Ultrasound was ordered today. The ultrasound will be performed along with shearwave elastography. Elastography  can assess liver fibrosis and determine if a patient has advanced fibrosis or cirrhosis without the need for liver biopsy. All of the above issues were discussed with the patient. All questions were answered. The patient expressed a clear understanding of the above. 25 minutes total time spent with this patient with more than 50% of this time spent counseling and coordinating care as described above. [address] [place] [address] in 24 weeks.        Ketty Byrne NP  Liver [place] of [facility], [address] [place] [address] Otilia Valles, [facility]   [phone]

## 2018-11-29 LAB
AFP L3 MFR SERPL: 7.8 % (ref 0–9.9)
AFP SERPL-MCNC: 4.6 NG/ML (ref 0–8)

## 2018-12-26 ENCOUNTER — HOSPITAL ENCOUNTER (OUTPATIENT)
Dept: ULTRASOUND IMAGING | Age: 63
Discharge: HOME | End: 2018-12-26
Payer: COMMERCIAL

## 2018-12-26 DIAGNOSIS — K70.30 ALCOHOLIC CIRRHOSIS OF LIVER WITHOUT ASCITES (HCC): ICD-10-CM

## 2018-12-26 PROCEDURE — 76705 ECHO EXAM OF ABDOMEN: CPT

## 2019-01-05 ENCOUNTER — PATIENT MESSAGE (OUTPATIENT)
Dept: HEMATOLOGY | Age: 64
End: 2019-01-05

## 2019-01-23 NOTE — TELEPHONE ENCOUNTER
From: Jhonny Smoker  To: Thelma Del Real NP  Sent: 1/5/2019 12:40 PM EST  Subject: Test Results Question    Joshua,  I see results are in on recent ultra-sound. Should I be expecting a call or no issues to discuss as I am good to go until I see you on next appointment. ThanksRUDDY

## 2019-04-24 ENCOUNTER — ANESTHESIA (OUTPATIENT)
Dept: ENDOSCOPY | Age: 64
End: 2019-04-24
Payer: COMMERCIAL

## 2019-04-24 ENCOUNTER — HOSPITAL ENCOUNTER (OUTPATIENT)
Age: 64
Setting detail: OUTPATIENT SURGERY
Discharge: HOME | End: 2019-04-24
Attending: INTERNAL MEDICINE | Admitting: INTERNAL MEDICINE
Payer: COMMERCIAL

## 2019-04-24 ENCOUNTER — ANESTHESIA EVENT (OUTPATIENT)
Dept: ENDOSCOPY | Age: 64
End: 2019-04-24
Payer: COMMERCIAL

## 2019-04-24 VITALS
HEIGHT: 70 IN | HEART RATE: 63 BPM | BODY MASS INDEX: 32.21 KG/M2 | DIASTOLIC BLOOD PRESSURE: 66 MMHG | OXYGEN SATURATION: 97 % | TEMPERATURE: 98 F | WEIGHT: 225 LBS | SYSTOLIC BLOOD PRESSURE: 107 MMHG | RESPIRATION RATE: 16 BRPM

## 2019-04-24 PROCEDURE — C1886 CATHETER, ABLATION: HCPCS | Performed by: INTERNAL MEDICINE

## 2019-04-24 PROCEDURE — 76040000019: Performed by: INTERNAL MEDICINE

## 2019-04-24 PROCEDURE — 74011000258 HC RX REV CODE- 258

## 2019-04-24 PROCEDURE — 76060000031 HC ANESTHESIA FIRST 0.5 HR: Performed by: INTERNAL MEDICINE

## 2019-04-24 PROCEDURE — 74011250636 HC RX REV CODE- 250/636

## 2019-04-24 RX ORDER — MIDAZOLAM HYDROCHLORIDE 1 MG/ML
.25-1 INJECTION, SOLUTION INTRAMUSCULAR; INTRAVENOUS
Status: DISCONTINUED | OUTPATIENT
Start: 2019-04-24 | End: 2019-04-24 | Stop reason: HOSPADM

## 2019-04-24 RX ORDER — DEXTROMETHORPHAN/PSEUDOEPHED 2.5-7.5/.8
1.2 DROPS ORAL
Status: DISCONTINUED | OUTPATIENT
Start: 2019-04-24 | End: 2019-04-24 | Stop reason: HOSPADM

## 2019-04-24 RX ORDER — FLUMAZENIL 0.1 MG/ML
0.2 INJECTION INTRAVENOUS
Status: DISCONTINUED | OUTPATIENT
Start: 2019-04-24 | End: 2019-04-24 | Stop reason: HOSPADM

## 2019-04-24 RX ORDER — PROPOFOL 10 MG/ML
INJECTION, EMULSION INTRAVENOUS AS NEEDED
Status: DISCONTINUED | OUTPATIENT
Start: 2019-04-24 | End: 2019-04-24 | Stop reason: HOSPADM

## 2019-04-24 RX ORDER — SODIUM CHLORIDE 9 MG/ML
50 INJECTION, SOLUTION INTRAVENOUS CONTINUOUS
Status: DISCONTINUED | OUTPATIENT
Start: 2019-04-24 | End: 2019-04-24 | Stop reason: HOSPADM

## 2019-04-24 RX ORDER — SODIUM CHLORIDE 9 MG/ML
INJECTION, SOLUTION INTRAVENOUS
Status: DISCONTINUED | OUTPATIENT
Start: 2019-04-24 | End: 2019-04-24 | Stop reason: HOSPADM

## 2019-04-24 RX ORDER — SODIUM CHLORIDE 0.9 % (FLUSH) 0.9 %
5-40 SYRINGE (ML) INJECTION AS NEEDED
Status: DISCONTINUED | OUTPATIENT
Start: 2019-04-24 | End: 2019-04-24 | Stop reason: HOSPADM

## 2019-04-24 RX ORDER — ATROPINE SULFATE 0.1 MG/ML
0.5 INJECTION INTRAVENOUS
Status: DISCONTINUED | OUTPATIENT
Start: 2019-04-24 | End: 2019-04-24 | Stop reason: HOSPADM

## 2019-04-24 RX ORDER — SODIUM CHLORIDE 0.9 % (FLUSH) 0.9 %
5-40 SYRINGE (ML) INJECTION EVERY 8 HOURS
Status: DISCONTINUED | OUTPATIENT
Start: 2019-04-24 | End: 2019-04-24 | Stop reason: HOSPADM

## 2019-04-24 RX ORDER — EPINEPHRINE 0.1 MG/ML
1 INJECTION INTRACARDIAC; INTRAVENOUS
Status: DISCONTINUED | OUTPATIENT
Start: 2019-04-24 | End: 2019-04-24 | Stop reason: HOSPADM

## 2019-04-24 RX ORDER — NALOXONE HYDROCHLORIDE 0.4 MG/ML
0.4 INJECTION, SOLUTION INTRAMUSCULAR; INTRAVENOUS; SUBCUTANEOUS
Status: DISCONTINUED | OUTPATIENT
Start: 2019-04-24 | End: 2019-04-24 | Stop reason: HOSPADM

## 2019-04-24 RX ORDER — FENTANYL CITRATE 50 UG/ML
100 INJECTION, SOLUTION INTRAMUSCULAR; INTRAVENOUS
Status: DISCONTINUED | OUTPATIENT
Start: 2019-04-24 | End: 2019-04-24 | Stop reason: HOSPADM

## 2019-04-24 RX ADMIN — PROPOFOL 50 MG: 10 INJECTION, EMULSION INTRAVENOUS at 11:13

## 2019-04-24 RX ADMIN — SODIUM CHLORIDE: 9 INJECTION, SOLUTION INTRAVENOUS at 11:04

## 2019-04-24 RX ADMIN — PROPOFOL 50 MG: 10 INJECTION, EMULSION INTRAVENOUS at 11:14

## 2019-04-24 RX ADMIN — PROPOFOL 50 MG: 10 INJECTION, EMULSION INTRAVENOUS at 11:19

## 2019-04-24 RX ADMIN — PROPOFOL 50 MG: 10 INJECTION, EMULSION INTRAVENOUS at 11:16

## 2019-04-24 RX ADMIN — PROPOFOL 50 MG: 10 INJECTION, EMULSION INTRAVENOUS at 11:18

## 2019-04-24 NOTE — DISCHARGE INSTRUCTIONS
118 [facility].  217 [facility] [address] [place] [address]  [phone]                     DISCHARGE INSTRUCTIONS    Rachel Alicia  [id number]  1955    DISCOMFORT:  Sore throat- throat lozenges or warm salt water gargle  redness at IV site- apply warm compress to area; if redness or soreness persist- contact your physician  Gaseous discomfort- walking, belching will help relieve any discomfort  You may not operate a vehicle for 12 hours  You may not engage in an occupation involving machinery or appliances for rest of today  You may not drink alcoholic beverages for at least 12 hours  Avoid making any critical decisions for at least 24 hour  DIET  You may eat and drink after you leave. You may resume your regular diet - however -  remember your colon is empty and a heavy meal will produce gas. Avoid these foods:  vegetables, fried / greasy foods, carbonated drinks    ACTIVITY  You may resume your normal daily activities   Spend the remainder of the day resting -  avoid any strenuous activity. CALL M.D. ANY SIGN OF   Increasing pain, nausea, vomiting  Abdominal distension (swelling)  New increased bleeding (oral or rectal)  Fever (chills)  Pain in chest area  Bloody discharge from nose or mouth  Shortness of breath    Follow-up Instructions:   Call Dr. Helene Buitrago for any questions or problems. If we took a biopsy please call the office within 2 weeks to discuss your                             pathology results. Telephone # [phone]        Continue same medications. ENDOSCOPY FINDINGS:   Hiatal Hernia  Irregular Z Line  GAVE  Scarring from previous banding         118 [facility].  [address]  [phone]                     Discharge Instructions after Ablation of St's Esophagus    You have undergone an upper Endoscopy with ablation of St's esophagus.   You may experience one or more of the following symptoms after treatment: chest discomfort, sore throat, difficulty or pain when swallowing and /or nausea/vomiting. These symptoms should improve with each day. You will be provided with several medications and specific instructions (below) to make you as comfortable as possible during this time. Should any of your symptoms be more severe in nature or longer in duration then we have described, please contact your physician. It is important to continue a strict and long-term regimen of anti-secretory medication after this treatment, such as Nexium, Prevacid, or other similar medication. ·  Maximize anti-secretory regimen, per MD  Please DO NOT stop taking this   medicine. If you are unable to swallow the pill you may crush it if allowed   or contact your physician for a liquid form. ·  Antacid/lidocaine mixture by mouth as needed, per MD    ·  Liquid acetaminophen (Tylenol) with or without codeine by mouth as needed, per  MD    ·  Anti-emetic (anti-nausea) medication per rectum as needed, per MD    ·  Full liquid diet for 24 hours, and then advance to soft diet for 1 week. Avoid   extreme cold or hot beverage and food. Avoid aspirin or non-steroidal anti-inflammatory medications (motrin, advil, and   Ibuprofen)    ·  Contact your treating physician immediately for significant chest pain, difficulty   swallowing, fever, bleeding, abdominal pain, difficulty breathing, vomiting or   other warning signs provided by the physician, so that the physician may    complete the appropriate diagnostic work-up and/or interventions as needed to   avoid complications. ·  If you seek care for a digestive issue from any healthcare personnel in the   next 6 months following this procedure, other than the treating physician,   the treating physician should be consulted before any treatment is    Initiated.

## 2019-04-24 NOTE — PROGRESS NOTES
Received report from anesthesia staff on vital signs and status of patient.     Scope precleaned at bedside by Yo Mg

## 2019-04-24 NOTE — ROUTINE PROCESS
Lynette Hier  1955  [id number]    Situation:  Verbal report received from: Angelina Storey RN  Procedure: Procedure(s):  ESOPHAGOGASTRODUODENOSCOPY (EGD)  ENDOSCOPIC HALO ABLATION    Background:    Preoperative diagnosis: St's Esophagus  Postoperative diagnosis: Hiatal Hernia  Irregular Z Line  GAVE  Scarring from previous banding    :  Dr. Kittie Buerger  Assistant(s): Endoscopy Technician-1: Simone Arthur  Endoscopy RN-1: Marcelina Rosen RN    Specimens: * No specimens in log *  H. Pylori  no    Assessment:  Intra-procedure medications   Anesthesia gave intra-procedure sedation and medications, see anesthesia flow sheet yes    Intravenous fluids: NS@ KVO     Vital signs stable     Abdominal assessment: round and soft     Recommendation:  Discharge patient per MD order.     Family or Friend   Permission to share finding with family or friend yes

## 2019-04-24 NOTE — ANESTHESIA PREPROCEDURE EVALUATION
Relevant Problems   No relevant active problems       Anesthetic History   No history of anesthetic complications            Review of Systems / Medical History  Patient summary reviewed, nursing notes reviewed and pertinent labs reviewed    Pulmonary  Within defined limits                 Neuro/Psych   Within defined limits           Cardiovascular    Hypertension          CAD         GI/Hepatic/Renal     GERD: well controlled      Liver disease     Endo/Other    Diabetes: well controlled, type 2         Other Findings              Physical Exam    Airway  Mallampati: II  TM Distance: > 6 cm  Neck ROM: normal range of motion   Mouth opening: Normal     Cardiovascular  Regular rate and rhythm,  S1 and S2 normal,  no murmur, click, rub, or gallop             Dental  No notable dental hx       Pulmonary  Breath sounds clear to auscultation               Abdominal  GI exam deferred       Other Findings            Anesthetic Plan    ASA: 3  Anesthesia type: MAC            Anesthetic plan and risks discussed with: Patient

## 2019-04-24 NOTE — PROCEDURES
118 [facility] [address]  [address] [facility] [address] [facility], [address]  [phone]                            NAME:  Madelin Aponte   :   1955   MRN:   [id number]     Date/Time:  2019 11:24 AM    Endoscopy with Channel Ablation of Esophageal Tissue Procedure Note    :  Gerson Dumont MD    Surgical Assistant: None    Implants: none    Referring Provider:  Reji Bocanegra. Belen Greenwood MD    Anethesia/Sedation:  MAC anesthesia    Baseline Patient History:  · Length of IM: < 1 cm  · Presence of Dysplasia Not known  · GERD Symptoms NO  · Anti-Secretory Therapy Yes  Pre-Op Diagnosis: St's esophagus  Post-Op Diagnosis: St's esophagus    Summary of Findings:     Esophagus: Irregular Z line was noted. No nodularity was seen. No Velarde colored islands were noted. No varices were seen. Scarring from previous band ligation was noted   Stomach: Streaky erythema was noted in the antrum radiating towards the pylorus, consistent with gastric antral vascular ectasia  Duodenum/jejunum:normal    Procedure Description:  The patient was positioned in the left lateral decubitus position and vital sign monitoring equipment connected in the usual manner. Intravenous sedation was administered. (Esophagoscopy or EGD) was performed with identification of the top of the intestinal metaplasia and the top of the gastric folds. The distance from the bite block to each of these anatomic landmarks was recorded, and the total length of intestinal metaplasia calculated from these measurements. These landmarks were located as follows:    NBI was used. The St's esophagus tissue was irrigated with water. The endoscope was introduced. The Ablation Catheter was introduced through the endoscope working channel. Sts tissue was targeted. The endoscope with Ablation Catheter was positioned under direct visualization so that the Ablation Catheter was in contact with Sts tissue at GE junction. Energy was applied twice at a power density of 48 W/cm2 and energy density of 12 J/cm2. The electrode was then moved to the next region of visible of Sts tissue. Ablation was repeated until all visible Sts tissue was ablated. The Ablation Catheter was removed through the endoscope working channel and cleaned. The endoscope was left in place. The ablation zone was cleaned of coagulative debris. The Ablation Catheter was then reintroduced. A second complete ablation set was applied as in the first treatment set. The Ablation Catheter was removed through the endoscope working channel. The ablated area was inspected. A total of 12 ablations were performed. The endoscope was then removed.     Follow Up:   Follow post HALO ablation guidelines   EGD in 1 year for surveillance of St's and varices   Follow up with Hepatology as scheduled   Continue current medications

## 2019-04-24 NOTE — H&P
118 [facility] [address]  [address]  1400 [facility], [address]  [phone]                                History and Physical     NAME: Nicola Gomez   :  1955   MRN:  [id number]     HPI:  The patient was seen and examined. Past Surgical History:   Procedure Laterality Date    HX CATARACT REMOVAL      bilateral    HX COLECTOMY      diverticulitis    HX COLOSTOMY      then reversable    HX GI  2010    endoscopy    HX GI      bowel resection - one surgery as of 3/11/2015    HX GI      EGD with halo    HX HEART CATHETERIZATION  3/28/2013    no stents    HX HERNIA REPAIR      3x ventral hernia, 1 testicular    HX ORTHOPAEDIC  1985    right wrist fracture (old injury) - and right carpal tunnel    HX ORTHOPAEDIC Right 2016    wrist surgery - fusion and second carpal tunnel     Past Medical History:   Diagnosis Date    St's esophagus 2011    CAD (coronary artery disease), native coronary artery     p/mLAD 60% ((FFR 0.84). Otherwise mild coronary artery disease.  Chronic hepatitis C without mention of hepatic coma, genotype 1 2011    Chronic pain     back    Cirrhosis (HCC)     GERD (gastroesophageal reflux disease) 2011    H/O alcohol abuse     quit few months ago, particpates in [place] [id number]    H/O tobacco use, presenting hazards to health     quit few months ago    History of echocardiogram 2013    EF 65%. No reg'l WMA. RVSP 25 mmHg.  History of myocardial perfusion scan 2013    Fixed inferior defect, likely artifact. Sm, mild reversible apical defect concerning for mild ischemia; could be apical thinning. Sm reversible distal inferolateral apical wall. Sm distal inferolateral, apical, & inferoapical defect possibly ischemia. EF 74%. No WMA.   Neg EKG on pharm stress test.    Hyperlipidemia 2011    Hypertension 20yrs+- 1990's    Liver disease     chronic hep c   -non detectable since     OA (osteoarthritis)  Other ill-defined conditions(799.89)     triglycerides    S/P cardiac cath 03/28/2013    p/mLAD 60% ((FFR 0.84). Otherwise mild coronary artery disease. RA 9.  RV 48/14. PA 50/20. W 25. COCI:  6.5/3.2 (TD) and 6.8/3.2 (Ingrid).  Thrombocytopenia, secondary to cirrhosis 5/23/2011    Type II or unspecified type diabetes mellitus without mention of complication, not stated as uncontrolled 2007    Ventral hernia, unspecified, without mention of obstruction or gangrene 5/23/2011     Social History     Tobacco Use    Smoking status: Former Smoker     Packs/day: 0.50     Years: 25.00     Pack years: 12.50     Last attempt to quit: 1/19/2016     Years since quitting: 3.2    Smokeless tobacco: Never Used   Substance Use Topics    Alcohol use: No     Alcohol/week: 0.0 oz    Drug use: No     No Known Allergies  Family History   Problem Relation Age of Onset    Diabetes Mother     Hypertension Mother     Other Mother         Gout    Heart Failure Mother     Cancer Father         carcinoma left lung    Diabetes Sister     Heart Failure Sister     HIV/AIDS Brother      No current facility-administered medications for this encounter. Facility-Administered Medications Ordered in Other Encounters   Medication Dose Route Frequency    0.9% sodium chloride infusion   IntraVENous CONTINUOUS         PHYSICAL EXAM:  General: WD, WN. Alert, cooperative, no acute distress    HEENT: NC, Atraumatic. PERRLA, EOMI. Anicteric sclerae. Lungs:  CTA Bilaterally. No Wheezing/Rhonchi/Rales. Heart:  Regular  rhythm,  No murmur, No Rubs, No Gallops  Abdomen: Soft, Non distended, Non tender.  +Bowel sounds, no HSM  Extremities: No c/c/e  Neurologic:  CN 2-12 gi, Alert and oriented X 3. No acute neurological distress   Psych:   Good insight. Not anxious nor agitated. The heart, lungs and mental status were satisfactory for the administration of MAC sedation and for the procedure.       Mallampati score: 2       Assessment:   · St's    Plan:   · Endoscopic procedure  · MAC sedation   ·

## 2019-05-29 ENCOUNTER — HOSPITAL ENCOUNTER (OUTPATIENT)
Dept: LAB | Age: 64
Discharge: HOME | End: 2019-05-29
Payer: COMMERCIAL

## 2019-05-29 ENCOUNTER — OFFICE VISIT (OUTPATIENT)
Dept: HEMATOLOGY | Age: 64
End: 2019-05-29

## 2019-05-29 VITALS
OXYGEN SATURATION: 98 % | DIASTOLIC BLOOD PRESSURE: 90 MMHG | HEART RATE: 73 BPM | TEMPERATURE: 98.2 F | SYSTOLIC BLOOD PRESSURE: 151 MMHG | BODY MASS INDEX: 32.35 KG/M2 | HEIGHT: 70 IN | WEIGHT: 226 LBS

## 2019-05-29 DIAGNOSIS — K70.30 ALCOHOLIC CIRRHOSIS OF LIVER WITHOUT ASCITES (HCC): ICD-10-CM

## 2019-05-29 DIAGNOSIS — K70.30 ALCOHOLIC CIRRHOSIS OF LIVER WITHOUT ASCITES (HCC): Primary | ICD-10-CM

## 2019-05-29 LAB
ALBUMIN SERPL-MCNC: 3.7 G/DL (ref 3.4–5)
ALBUMIN/GLOB SERPL: 1.1 {RATIO} (ref 0.8–1.7)
ALP SERPL-CCNC: 135 U/L (ref 45–117)
ALT SERPL-CCNC: 30 U/L (ref 16–61)
ANION GAP SERPL CALC-SCNC: 7 MMOL/L (ref 3–18)
AST SERPL-CCNC: 29 U/L (ref 15–37)
BASOPHILS # BLD: 0 K/UL (ref 0–0.1)
BASOPHILS NFR BLD: 0 % (ref 0–2)
BILIRUB DIRECT SERPL-MCNC: 0.2 MG/DL (ref 0–0.2)
BILIRUB SERPL-MCNC: 0.7 MG/DL (ref 0.2–1)
BUN SERPL-MCNC: 30 MG/DL (ref 7–18)
BUN/CREAT SERPL: 18 (ref 12–20)
CALCIUM SERPL-MCNC: 9 MG/DL (ref 8.5–10.1)
CHLORIDE SERPL-SCNC: 105 MMOL/L (ref 100–108)
CO2 SERPL-SCNC: 27 MMOL/L (ref 21–32)
CREAT SERPL-MCNC: 1.65 MG/DL (ref 0.6–1.3)
DIFFERENTIAL METHOD BLD: ABNORMAL
EOSINOPHIL # BLD: 0.1 K/UL (ref 0–0.4)
EOSINOPHIL NFR BLD: 2 % (ref 0–5)
ERYTHROCYTE [DISTWIDTH] IN BLOOD BY AUTOMATED COUNT: 14.7 % (ref 11.6–14.5)
GLOBULIN SER CALC-MCNC: 3.5 G/DL (ref 2–4)
GLUCOSE SERPL-MCNC: 170 MG/DL (ref 74–99)
HCT VFR BLD AUTO: 37.8 % (ref 36–48)
HGB BLD-MCNC: 12.3 G/DL (ref 13–16)
INR PPP: 1.2 (ref 0.8–1.2)
LYMPHOCYTES # BLD: 0.5 K/UL (ref 0.9–3.6)
LYMPHOCYTES NFR BLD: 18 % (ref 21–52)
MCH RBC QN AUTO: 27.2 PG (ref 24–34)
MCHC RBC AUTO-ENTMCNC: 32.5 G/DL (ref 31–37)
MCV RBC AUTO: 83.4 FL (ref 74–97)
MONOCYTES # BLD: 0.2 K/UL (ref 0.05–1.2)
MONOCYTES NFR BLD: 9 % (ref 3–10)
NEUTS SEG # BLD: 2 K/UL (ref 1.8–8)
NEUTS SEG NFR BLD: 71 % (ref 40–73)
PLATELET # BLD AUTO: 51 K/UL (ref 135–420)
POTASSIUM SERPL-SCNC: 4.6 MMOL/L (ref 3.5–5.5)
PROT SERPL-MCNC: 7.2 G/DL (ref 6.4–8.2)
PROTHROMBIN TIME: 15.2 SEC (ref 11.5–15.2)
RBC # BLD AUTO: 4.53 M/UL (ref 4.7–5.5)
SODIUM SERPL-SCNC: 139 MMOL/L (ref 136–145)
WBC # BLD AUTO: 2.8 K/UL (ref 4.6–13.2)

## 2019-05-29 PROCEDURE — 85610 PROTHROMBIN TIME: CPT

## 2019-05-29 PROCEDURE — 36415 COLL VENOUS BLD VENIPUNCTURE: CPT

## 2019-05-29 PROCEDURE — 80048 BASIC METABOLIC PNL TOTAL CA: CPT

## 2019-05-29 PROCEDURE — 80076 HEPATIC FUNCTION PANEL: CPT

## 2019-05-29 PROCEDURE — 82107 ALPHA-FETOPROTEIN L3: CPT

## 2019-05-29 PROCEDURE — 85025 COMPLETE CBC W/AUTO DIFF WBC: CPT

## 2019-05-29 NOTE — PROGRESS NOTES
[facility], MD, FACP, [place], [place]      Maritza Postal, DONITA Marie, ACNP-BC     April S Rigo, NP   Bobby Medrano, NP    Lacie Clements, GAIL Dai [facility] 136    at [address], 81 [facility], [facility] 22.    [phone]    FAX: [address] [place] [address], [address], [address]    [phone]    FAX: [phone]       Patient Care Team:  Francisco Mark. Angelique Wiley MD as PCP - General (Family Practice)  Jo Manzo MD (Gastroenterology)  Ambrosio Kitchen MD (Radiology)  Ozzie Reyes MD (Internal Medicine)  Darian Live MD (Gastroenterology)        Problem List  Date Reviewed: 11/28/2018          Codes Class Noted    Type 2 diabetes mellitus with nephropathy [facility]) ICD-10-CM: E11.21  ICD-9-CM: 250.40, 583.81  1/30/2018        H/O alcohol abuse ICD-10-CM: Z87.898  ICD-9-CM: 305.03  Unknown    Overview Signed 4/27/2016  6:37 PM by Sahli Trevino     quit few months ago, particpates in [place] [address]             H/O tobacco use, presenting hazards to health ICD-10-CM: Z87.891  ICD-9-CM: V15.82  Unknown    Overview Signed 4/27/2016  6:37 PM by Sahil Trevino     quit few months ago             CAD (coronary artery disease), native coronary artery ICD-10-CM: I25.10  ICD-9-CM: 414.01  Unknown    Overview Addendum 1/3/2014  3:55 PM by Sahil Polanco     p/mLAD 60% ((FFR 0.84). Otherwise mild coronary artery disease (march 2013).  LV EF 65% (echo feb 2013)             Hepatic encephalopathy (HCC) ICD-10-CM: K72.90  ICD-9-CM: 572.2  2/26/2013        CKD (chronic kidney disease) ICD-10-CM: N18.9  ICD-9-CM: 585.9  2/26/2013        Esophageal varices ([facility] 75.) ICD-10-CM: I85.00  ICD-9-CM: 456.1  2/26/2013        Cirrhosis ([facility].) ICD-10-CM: K74.60  ICD-9-CM: 571.5  2/2/2013    Overview Signed 2/2/2013  5:22 PM by Helen Retana MD     Secondary to chronic HCV             Ascites ICD-10-CM: R18.8  ICD-9-CM: 789.59  2/2/2013        Hypertension ICD-10-CM: I10  ICD-9-CM: 401.9  5/23/2011        Ventral hernia, unspecified, without mention of obstruction or gangrene ICD-10-CM: K43.9  ICD-9-CM: 553.20  5/23/2011        Hyperlipidemia ICD-10-CM: E78.5  ICD-9-CM: 272.4  5/23/2011        Type II or unspecified type diabetes mellitus without mention of complication, not stated as uncontrolled ICD-10-CM: E11.9  ICD-9-CM: 250.00  5/23/2011        GERD (gastroesophageal reflux disease) ICD-10-CM: K21.9  ICD-9-CM: 530.81  5/23/2011        St's esophagus ICD-10-CM: K22.70  ICD-9-CM: 530.85  5/23/2011        Chronic hepatitis C ([facility] ICD-10-CM: B18.2  ICD-9-CM: 070.54  5/23/2011    Overview Signed 2/2/2013  5:24 PM by Helen Retana MD     Genotype 1  Peginterferon/ribavirin non-response as part of HALT-C clinical trial.    Peginterferon maintenance therapy during HALT-C clinical trial.   Conatus non-responder study. Discontinued secondary to joint pain. Peginterferon/Riba/Boceprevir. Treated for 12 weeks with significant anemia and thrombocytopenia. Non-responder. 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 The HCV has been treated and cured. The active problem list, all pertinent past medical history, medications, liver histology, endoscopic studies, radiologic findings and laboratory findings related to the liver disorder were reviewed with the patient. Ascites resolved. The patient has not developed hepatic encephalopathy. The patient has esophageal varices without bleeding.       The patient has began, but has not completed liver transplant evaluation testing. The Thallium stress test demonstrated a fixed and 2 small reversible lesions. Cardiac catheterization did not indicate the need for stenting. Mr. Roya Cunningham was noted to have a nodule on his thyroid while being worked up for a pleural effusion. He had a biopsy that was negative for malignancy. The patient had a LI-RADS 4 lesions from abdominal MRI performed in 11/2017. This was worked up and biopsied. Biopsy demonstrated that this lesion is a hemangioma. We have discussed the liver transplant process and various centers he could go to for liver transplantation. He would like to be seen at [place], [place] if this becomes neccessary. Mr. Roya Cunningham completed 12 weeks of treatment with sofosbuvir and simeprevir in June 2014. He had a SVR two years post treatment. Mr. Dandy Crenshaw denies any further alcohol use since January 2016. He states that he continues to participate in [place] [address]. Dr. Nicolette Casiano has performed HALO procedures for St esophagus. Dr. Nicolette Casiano continue to follow the patient. His last EGD was 02/07/2018. Variceal ablation (including the St's mucosa) was performed with 2 of bands placed. He will return to see Dr. Nicolette Casiano in 02/2019. Mr. Roya Cunningham is overall feeling well since his last office visit. His lower extremity edema has completely resolved and he is currently not taking any diuretics. He deneis any shortness of breath. He continues to struggle with arthralgias due to osteoarthritis. He does have difficulty completing daily activities due to arthritis. The patient has not experienced problems concentrating, swelling of the abdomen, swelling of the lower extremities, hematemesis, hematochezia. Since the last office appointment the patient has:  Had no changes in the liver disease. Continues in [place] [address]. Had an EGD by Dr. Nicolette Casiano.       ALLERGIES:  No Known Allergies    Current Outpatient Medications   Medication Sig  testosterone 30 mg/actuation (1.5 mL) slpm by TransDERmal route.  lisinopril (PRINIVIL, ZESTRIL) 5 mg tablet Take 5 mg by mouth daily.  terazosin (HYTRIN) 5 mg capsule Take 5 mg by mouth daily.  atorvastatin (LIPITOR) 10 mg tablet Take 10 mg by mouth every evening.  metoprolol succinate (TOPROL-XL) 50 mg XL tablet Take 50 mg by mouth daily. Indications: HYPERTENSION    glipiZIDE SR (GLUCOTROL) 10 mg CR tablet Take 10 mg by mouth two (2) times a day. Indications: type 2 diabetes mellitus    saxagliptin (ONGLYZA) 5 mg Tab tablet Take 5 mg by mouth daily.  OMEPRAZOLE (PRILOSEC PO) Take 20 mg by mouth daily. Indications: GERD     No current facility-administered medications for this visit. SYSTEM REVIEW NOT RELATED TO LIVER DISEASE OR REVIEWED ABOVE:  Constitution systems: Negative for fever, chills, weight gain, weight loss. Eyes: Negative for visual changes. ENT: Negative for sore throat, painful swallowing. Respiratory: Negative for cough, hemoptysis, SOB. Cardiology: Negative for chest pain, palpitations. GI:  Negative for constipation or diarrhea. : Negative for urinary frequency, dysuria, hematuria, nocturia. Skin: Negative for rash. Hematology: Positive for easy bruising. Negative for blood clots. Musculo-skelatal: Negative for back pain, muscle pain, weakness. Neurologic: Negative for headaches, dizziness, vertigo, memory problems not related to HE. Psychology: Negative for anxiety, depression. FAMILY/SOCIAL HISTORY:  The patient is . The patient has 2 children. Stoped smoking 1/2016. History of heavy alcohol use. Last alcohol consumption was 1/2016. The patient retired in 2010. PHYSICAL EXAMINATION:  Visit Vitals  /90   Pulse 73   Temp 98.2 °F (36.8 °C) (Tympanic)   Ht 5' 10\" (1.778 m)   Wt 226 lb (102.5 kg)   SpO2 98%   BMI 32.43 kg/m²       PHYSICAL EXAMINATION:  VS: per nursing note  General: No acute distress.    Eyes: Sclera anicteric. ENT: No oral lesions. Thyroid normal.  Nodes: No adenopathy. Skin: No spider angiomata. No jaundice. No palmar erythema. Respiratory: Lungs clear to auscultation. Cardiovascular: Regular heart rate. No murmurs. No JVD. Abdomen: Soft non-tender, liver size normal to percussion/palpation. Spleen not palpable. No obvious ascites. Extremities: No edema. No muscle wasting. No gross arthritic changes. Neurologic: Alert and oriented. Cranial nerves grossly intact. No asterixis. LABORATORY:  [place] [address] Units 5/29/2019 11/28/2018   WBC 4.6 - 13.2 K/uL 2.8 (L) 2.5 (L)   ANC 1.8 - 8.0 K/UL 2.0 1.8   HGB 13.0 - 16.0 g/dL 12.3 (L) 12.0 (L)    - 420 K/uL 51 (L) 57 (L)   INR 0.8 - 1.2   1.2 1.1   AST 15 - 37 U/L 29 21   ALT 16 - 61 U/L 30 26   Alk Phos 45 - 117 U/L 135 (H) 105   Bili, Total 0.2 - 1.0 MG/DL 0.7 0.7   Bili, Direct 0.0 - 0.2 MG/DL 0.2 0.3 (H)   Albumin 3.4 - 5.0 g/dL 3.7 3.8   BUN 7.0 - 18 MG/DL 30 (H) 31 (H)   Creat 0.6 - 1.3 MG/DL 1.65 (H) 1.33 (H)   Creat (iSTAT) 0.6 - 1.3 MG/DL     Na 136 - 145 mmol/L 139 140   K 3.5 - 5.5 mmol/L 4.6 4.3   Cl 100 - 108 mmol/L 105 107   CO2 21 - 32 mmol/L 27 25   Glucose 74 - 99 mg/dL 170 (H) 124 (H)     Cancer Screening Latest Ref Rng & Units 11/28/2018 5/29/2018 1/30/2018   AFP, Serum 0.0 - 8.0 ng/mL 4.6 6.1 7.6   AFP-L3% 0.0 - 9.9 % 7.8 8.8 7.2     SEROLOGY  2/2012. Anti-HCV negative, CMV IgG positive, EBV IgG positive,     LIVER HISTOLOGY  12/2017. Liver mass biopsy. Hepatocytes show some mild steatosis. The lesion in this case is a hemangioma, and there are several fragments of it along with the liver tissue, characterized by a fibrous background and scattered simple, capillary-like vessels. The architecture is cavernous. There is no evidence of malignancy. 12/2018.   TRANSIENT HEPATIC ELASTOGRAPHY:   E Range: 8.9-13.9 kPa  E Mean: 11.5 kPa  E Median: 11.6 kPa  E Std: 1.7 kPa    ENDOSCOPIC PROCEDURES  11/2009:  EGD. Reported to demonstrate varices and Sts esophagus. 12/2010:  EGD. Grade I-II esophageal varices. 12/2010:  Colonoscopy - unremarkable. 3/2013. EGD by Dr Joaquim Tavares. Report requested. 05/27/2014:  EGD per MLS. Multiple medium esophageal varices. Six bands placed. Barrets esophagus (bx negative for malignancy). Repeat in 6 weeks. 09/2014: EGD per MLS. Small esophageal varices. Mild portal hypertensive gastropathy. No gastric varices. Polyp biopsied in the duodenal bulb found to be carcinoid by pathology. Large segment of Barretts esophagus. Biopsy technologically difficult due to bleeding. 02/2015:  EGD per Dr. Octavio Miranda. Esophageal varices. Seven bands placed. Portal hypertensive gastropathy. St esophagus. 3/2015: EGD per Dr. Octavio Miranda. St esophagus. Portal hypertensive gastropathy. Varices. 4/2015: EGD per Dr. Octavio Miranda. Esophageal varices. Seven bands placed. Portal hypertensive gastropathy. St esophagus. Will continue EGDs every four months. Can not perform HALO procedure until varices completley obliterated. 9/2015:  EGD per Dr. Octavio Miranda. Grade 1 varices. Four bands applied. Repeat in 6 months. If varices obliterated will perform Halo. 9/2016:  EGD per Dr. Alysia Fried. Small varices in mid esophagus. Distal esophagus with several columns of St's as previously noted. Banding of this mucosa was attempted but bands would not apply. The mucosa was friable and started to bleed actively. 1:10:000 epinephrine was injected (5 ml) at various bleeding sites. This did not control the bleeding. Gold probe cautery was then applies to the bleeding sites and hemostasis was achieved. 11/2016:  EGD with HALO per Dr. Jorge Rowe. [place] colored mucosa was seen at 34 cm and extended upto 38 cm. No nodules or ulceration was seen. Scarring from previous band ligation of varices was seen. No varices were seen. Small sliding hiatal hernia was seen.  Stomach: Streaky erythema was seen in antrum and was radiating towards the pylorus. No gastric varices were seen Duodenum: normal  2/2017:  EGD with HALO per Dr. Skyler Hoover. Repeat in 3 months. 05/2017. EGD by Dr. Arnulfo Timmons. HALO for St's. Repeat in 3 months. 08/2017. EGD by Dr. Arnulfo Timmons. HALO for St's. Repeat in 3 months. 02/2018. EGD by Dr. Arnulfo Timmons. Esophagus: Few small 3-4 mm salmon colored islands were seen at the 38 cm. No nodularity was seen. Grade 1 varices were seen. Stomach: Streaky erythema was noted in the antrum radiating towards the pylorus, consistent with gastric antral vascular ectasia. Duodenum/jejunum:normal.  Variceal ablation (including the St's mucosa) was performed with 2 of bands placed. Repeat in one year. 04/2019. EGD by Dr. Arnulfo Timmons. Irregular Z line was noted. No nodularity was seen. No [place] colored islands were noted. No varices were seen. Scarring from previous band ligation was noted. Repeat in one year. RADIOLOGY  1/2009:  Ultrasound liver - echogenic consistent with cirrhosis, no liver mass lesions, no ascites. 11/2010:  Abdominal ultrasound - no focal liver lesions noted, no ascites. 03/2012:  Ultrasound of the liver. Echogenic consistent with chronic liver disease, cirrhosis. No liver mass lesions. No ascites. 09/2012: Ultrasound of the liver. Echogenic consistent with chronic liver disease, cirrhosis. No liver mass lesions. No ascites. 2/12:  Chest x-ray PA and Lateral:  Normal heart size without consolidation. Band of atelectasis or scarring noted in the retrosternal region. There is blunting on the right CP angle due to small effusion. 2/2013:  Ultrasound of liver. Echogenic consistent with cirrhosis. 2.5 x 1.7 lesion in left lobe. Appears larger than on previous US. No dilated bile ducts. No ascites. 2/2013. MRI abdomen. No contrast given. Changes consistent with cirrhosis. No liver mass lesions. No dilated bile ducts.   No bile duct strictures. No ascites. 03/2013 - Triple phase CT of abdomen. Hypervascular mass in left lobe stable and consistent with hemangioma. Stable non enhancing mass in segment 4/5. Large right pleural effusion. 08/2013: Ultrasound of the liver. Echogenic consistent with cirrhosis. No ascites. No bile duct strictures. 4.5 x 1.9 x 2.1 hyperechoic circumscribed structure similar to to CT which indicated hemangioma. 02/2014: The previously documented hemangioma within the subcapsular left hepatic lobe  is not identified on this exam. Hyperechoic mass within segment V/IV is identified, shows some interval increase since comparison ultrasound (maximum diameter currently 3.2 cm, previously 2.5 cm) but prior MRI suggests this represents a regenerative nodule. Second hyperechoic masslike area is now seen at subcapsular anterior right hepatic lobe measuring 3 cm maximally. This lesion is not clearly identified on prior ultrasound or MRI. Regenerative nodule is certainly possible but the finding is nonspecific.  04/2014:  Abdominal MRI. 2 cm lesion in the right hepatic lobe demonstrating faint arterial enhancement and early washout, concerning for hepatocellular carcinoma. 10/2014: Dynamic CT of the abdomen. Cirrhosis of the liver and evidence for portal hypertension with hepatomegaly and varices. Probable hemangioma although now with slightly atypical appearance in the left lobe of the liver lateral segment is not significantly changed in the interval in size as far back as 2011. This is an atypical appearance and behavior for hepatocellular carcinoma. 3. Probable focal fibrosis or regenerative nodule in the interlobar fissure. 3/2015: Ultrasound of the liver. A relatively discrete nodule is present in the right lobe measuring 3.2 x 2.2 x 2.2 cm. This is visible in retrospect on the prior study of 2014 February and is not hypervascular or well-defined on the following CT and MR studies.   11/2015: Abdominal ultrasound: Nodule in right hepatic lobe unchanged from previous imaging. No new lesions. No ascites. 01/2016: Ultrasound of liver. Echogenic consistent with cirrhosis. No liver mass lesions. No dilated bile ducts. No ascites. 9/2016: Ultrasound of the liver. Heterogeneous echotexture and lobulated contour of the liver, consistent with reported cirrhosis. Echogenic right hepatic mass is grossly stable given slight differences in technique/positioning. No new hepatic mass was seen. Additional previously seen left hepatic mass is not visualized on today's exam.  04/2017. Ultrasound of the liver. Redemonstrated heterogeneous echotexture and lobulated contour of the liver, compatible with history of cirrhosis. Stable echogenic mass in the right hepatic lobe. No new mass identified. Previously seen mass in the left hepatic lobe on prior CT scan remains inconspicuous by ultrasound. 11/2017. Abdominal MRI w/wo contrast.  Hepatic cirrhosis. Li-RADS 4 lesion in hepatic segment 3 (probable hepatocellular carcinoma). Faint arterial phase hyperenhancement throughout the left hepatic lobe. While infiltrative lesion not absolutely excluded, suspect finding reflects intralesional shunting related to the lesion. Scattered additional Li-RADS 3 hepatic lesions (indeterminate). 12/2017. Abdominal CT w/wo contrast. Complex hypervascular lesion in the periphery of segment 3 of the left hepatic lobe as described and measured above. Minimal interval increase in size. LI-RADS 4. Developing hepatoma is most likely. 06/2018. Ultrasound of the liver. Cirrhosis. Ovoid hyperechoic nodule in the right lobe, unchanged in the interval. Prior biopsy confirmed this as a hemangioma. 12/2018. Ultrasound of the liver. Coarsely heterogeneous echotexture with a nodular contour and prominent caudate lobe, in keeping with history of cirrhosis.   Redemonstrated hyperechoic mass measuring 1.4 x 1.3 x 1.0 cm and the left hepatic lobe, in keeping with a hemangioma demonstrated on prior studies which was previously biopsied. LIVER TRANSPLANT EVALUATION TESTING  2/2013. Blood ETOH positive. 2/2013. TSH 2.8, T3 112, T4 free 2.0  2/103. PSA 0.5  2/103. TB Quantiferon negative  2/2013. Blood type O+  2/2013. ECHO. Normal wall motion. LVEF 65%. No valve abnormalities. Trivial TR.  2/2013. FEV1 108% of predicted, FEV1% 103% of predicted, DLCO **% of predicted. 2/2013. Lexicon stress test.  LVEF 74%. Fixed inferior defect with normal wall motion. Small reversible defect at apex and in distal inferolateral apical wall.  02/2013:  DEXA scan - Osteopenia. 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 The most recent laboratory studies indicate that the liver transaminases are normal, alkaline phosphatase is elevated, tests of hepatic synthetic and metabolic function are normal, and the platelet count is depressed. Will perform laboratory testing to monitor liver function and degree of liver injury. This will include hepatic panel, a CBC w/ diff, a BMP, a PT/INR, and an AFP-L3%. Complications of cirrhosis were discussed in detail. We discussed thrombocytopenia, portal hypertension, varices, GI bleeding, peripheral edema, ascites, hepatic encephalopathy, and hepatocellular carcinoma. We discussed the need for follow ups on a regular basis to monitor for complications. We discussed the need for every 6 month liver imaging studies. Chronic HCV, genotype 1. He completed twelve weeks of treatment with simeprevir and sofosbuvir in June 2014. He had a SVR 2 years post treatment. Peripheral edema. Resolved. Esophageal varicies without prior bleeding. He has had repeat EGDs with HALO procedure performed. No varices identified. He returns to Dr. Faisal Verduzco on 02/2019. Hepatic encephalopathy has not developed to date. There is no need for treatment with lactulose and/or xifaxan at this time. ETOH abuse. He has not consumed any alcohol since 01/2016. Continue AA. The patient had a postive thalium stress test with reversible ischemia. He has a cardiac catheterization. No significant ASCAD identified. Thrombocytopenia secondary to cirrhosis. There is no evidence of overt bleeding. There is no indication for platelet transfusions or pharmacologic treatment to increase the platelet count. [address] screening. Recent imaging suggested that Mr. Demetrice Tyler had developed [address] He was referred to Dr. Adam Estevez for evaluation. The LI-RADS 4 lesion was biopsied and determined to be a hemangioma. The plan was to continue with advanced imaging, either MRI or CT, every 6 months moving forward. Unfortunately, this is very expensive. We will therefore perform ultrasound alternating with advanced imaging at 6 month intervals. These, along with AFP's every 6 months, should suffice for [address] screenings. Dynamic MRI was ordered today. All of the above issues were discussed with the patient. All questions were answered. The patient expressed a clear understanding of the above. 25 minutes total time spent with this patient with more than 50% of this time spent counseling and coordinating care as described above. [facility] 87 in 24 weeks.        Yolie Neal NP  Liver [place] of [facility], [address]   [phone]

## 2019-05-31 LAB
AFP L3 MFR SERPL: 7.1 % (ref 0–9.9)
AFP SERPL-MCNC: 4.8 NG/ML (ref 0–8)

## 2019-07-24 ENCOUNTER — HOSPITAL ENCOUNTER (OUTPATIENT)
Dept: MRI IMAGING | Age: 64
Discharge: HOME | End: 2019-07-24
Payer: COMMERCIAL

## 2019-07-24 DIAGNOSIS — K70.30 ALCOHOLIC CIRRHOSIS OF LIVER WITHOUT ASCITES (HCC): ICD-10-CM

## 2019-07-24 LAB — CREAT UR-MCNC: 1.6 MG/DL (ref 0.6–1.3)

## 2019-07-24 PROCEDURE — 74183 MRI ABD W/O CNTR FLWD CNTR: CPT

## 2019-07-24 PROCEDURE — A9575 INJ GADOTERATE MEGLUMI 0.1ML: HCPCS | Performed by: NURSE PRACTITIONER

## 2019-07-24 PROCEDURE — 82565 ASSAY OF CREATININE: CPT

## 2019-07-24 PROCEDURE — 74011636320 HC RX REV CODE- 636/320: Performed by: NURSE PRACTITIONER

## 2019-07-24 RX ADMIN — GADOTERATE MEGLUMINE 20 ML: 376.9 INJECTION INTRAVENOUS at 09:39

## 2019-08-01 DIAGNOSIS — K74.60 CIRRHOSIS OF LIVER WITHOUT ASCITES, UNSPECIFIED HEPATIC CIRRHOSIS TYPE (HCC): Primary | ICD-10-CM

## 2019-12-11 ENCOUNTER — HOSPITAL ENCOUNTER (OUTPATIENT)
Dept: MRI IMAGING | Age: 64
Discharge: HOME | End: 2019-12-11
Payer: MEDICARE

## 2019-12-11 DIAGNOSIS — K74.60 CIRRHOSIS OF LIVER WITHOUT ASCITES, UNSPECIFIED HEPATIC CIRRHOSIS TYPE (HCC): ICD-10-CM

## 2019-12-11 LAB — CREAT UR-MCNC: 1.5 MG/DL (ref 0.6–1.3)

## 2019-12-11 PROCEDURE — 82565 ASSAY OF CREATININE: CPT

## 2019-12-11 PROCEDURE — 74183 MRI ABD W/O CNTR FLWD CNTR: CPT

## 2019-12-11 PROCEDURE — A9575 INJ GADOTERATE MEGLUMI 0.1ML: HCPCS | Performed by: NURSE PRACTITIONER

## 2019-12-11 PROCEDURE — 74011636320 HC RX REV CODE- 636/320: Performed by: NURSE PRACTITIONER

## 2019-12-11 RX ADMIN — GADOTERATE MEGLUMINE 20 ML: 376.9 INJECTION INTRAVENOUS at 10:25

## 2019-12-13 NOTE — PROGRESS NOTES
Please let him know that his MRI is stable. Mass is unchanged and not suspicious for HCC. Thank you.

## 2020-01-15 ENCOUNTER — OFFICE VISIT (OUTPATIENT)
Dept: HEMATOLOGY | Age: 65
End: 2020-01-15

## 2020-01-15 ENCOUNTER — HOSPITAL ENCOUNTER (OUTPATIENT)
Dept: LAB | Age: 65
Discharge: HOME | End: 2020-01-15
Payer: MEDICARE

## 2020-01-15 VITALS
BODY MASS INDEX: 32.5 KG/M2 | TEMPERATURE: 97.5 F | DIASTOLIC BLOOD PRESSURE: 94 MMHG | OXYGEN SATURATION: 98 % | SYSTOLIC BLOOD PRESSURE: 168 MMHG | HEIGHT: 70 IN | WEIGHT: 227 LBS | HEART RATE: 59 BPM

## 2020-01-15 DIAGNOSIS — K74.60 CIRRHOSIS OF LIVER WITHOUT ASCITES, UNSPECIFIED HEPATIC CIRRHOSIS TYPE (HCC): ICD-10-CM

## 2020-01-15 DIAGNOSIS — K74.60 CIRRHOSIS OF LIVER WITHOUT ASCITES, UNSPECIFIED HEPATIC CIRRHOSIS TYPE (HCC): Primary | ICD-10-CM

## 2020-01-15 LAB
ALBUMIN SERPL-MCNC: 3.7 G/DL (ref 3.4–5)
ALBUMIN/GLOB SERPL: 1 {RATIO} (ref 0.8–1.7)
ALP SERPL-CCNC: 155 U/L (ref 45–117)
ALT SERPL-CCNC: 29 U/L (ref 16–61)
ANION GAP SERPL CALC-SCNC: 1 MMOL/L (ref 3–18)
AST SERPL-CCNC: 22 U/L (ref 10–38)
BASOPHILS # BLD: 0 K/UL (ref 0–0.1)
BASOPHILS NFR BLD: 0 % (ref 0–2)
BILIRUB DIRECT SERPL-MCNC: 0.2 MG/DL (ref 0–0.2)
BILIRUB SERPL-MCNC: 0.5 MG/DL (ref 0.2–1)
BUN SERPL-MCNC: 27 MG/DL (ref 7–18)
BUN/CREAT SERPL: 17 (ref 12–20)
CALCIUM SERPL-MCNC: 9.1 MG/DL (ref 8.5–10.1)
CHLORIDE SERPL-SCNC: 109 MMOL/L (ref 100–111)
CO2 SERPL-SCNC: 31 MMOL/L (ref 21–32)
CREAT SERPL-MCNC: 1.55 MG/DL (ref 0.6–1.3)
DIFFERENTIAL METHOD BLD: ABNORMAL
EOSINOPHIL # BLD: 0.1 K/UL (ref 0–0.4)
EOSINOPHIL NFR BLD: 5 % (ref 0–5)
ERYTHROCYTE [DISTWIDTH] IN BLOOD BY AUTOMATED COUNT: 14.8 % (ref 11.6–14.5)
GLOBULIN SER CALC-MCNC: 3.6 G/DL (ref 2–4)
GLUCOSE SERPL-MCNC: 146 MG/DL (ref 74–99)
HCT VFR BLD AUTO: 37.8 % (ref 36–48)
HGB BLD-MCNC: 12 G/DL (ref 13–16)
INR PPP: 1.2 (ref 0.8–1.2)
LYMPHOCYTES # BLD: 0.5 K/UL (ref 0.9–3.6)
LYMPHOCYTES NFR BLD: 20 % (ref 21–52)
MCH RBC QN AUTO: 26.7 PG (ref 24–34)
MCHC RBC AUTO-ENTMCNC: 31.7 G/DL (ref 31–37)
MCV RBC AUTO: 84 FL (ref 74–97)
MONOCYTES # BLD: 0.2 K/UL (ref 0.05–1.2)
MONOCYTES NFR BLD: 8 % (ref 3–10)
NEUTS SEG # BLD: 1.8 K/UL (ref 1.8–8)
NEUTS SEG NFR BLD: 67 % (ref 40–73)
PLATELET # BLD AUTO: 51 K/UL (ref 135–420)
POTASSIUM SERPL-SCNC: 4.9 MMOL/L (ref 3.5–5.5)
PROT SERPL-MCNC: 7.3 G/DL (ref 6.4–8.2)
PROTHROMBIN TIME: 14.8 SEC (ref 11.5–15.2)
RBC # BLD AUTO: 4.5 M/UL (ref 4.7–5.5)
SODIUM SERPL-SCNC: 141 MMOL/L (ref 136–145)
WBC # BLD AUTO: 2.7 K/UL (ref 4.6–13.2)

## 2020-01-15 PROCEDURE — 36415 COLL VENOUS BLD VENIPUNCTURE: CPT

## 2020-01-15 PROCEDURE — 85025 COMPLETE CBC W/AUTO DIFF WBC: CPT

## 2020-01-15 PROCEDURE — 85610 PROTHROMBIN TIME: CPT

## 2020-01-15 PROCEDURE — 80076 HEPATIC FUNCTION PANEL: CPT

## 2020-01-15 PROCEDURE — 80048 BASIC METABOLIC PNL TOTAL CA: CPT

## 2020-01-15 PROCEDURE — 82107 ALPHA-FETOPROTEIN L3: CPT

## 2020-01-15 RX ORDER — BACLOFEN 10 MG/1
10 TABLET ORAL
COMMUNITY
End: 2021-07-19

## 2020-01-15 RX ORDER — ANASTROZOLE 1 MG/1
1 TABLET ORAL
COMMUNITY
Start: 2019-12-17

## 2020-01-15 NOTE — PROGRESS NOTES
[address] [facility], MD, FACP, [facility], [place]      SantiagoDONITA Mercedes, [facility]     April S Rigo, NP   Doug Suero, NP    Corazon Ceballos, GAIL aDi [facility] [address]    at [place] [address] [address] [facility], [address], [facility] 22.    [phone]    FAX: [address] [place] [address]    at [address], [address], [address]    [phone]    FAX: [phone]       Patient Care Team:  Mckayla Ballard. Sarah Beth Gaxiola MD as PCP - General (Family Practice)  Christy Hernandez MD (Gastroenterology)  Jean-Pierre Seay MD (Radiology)  Cammie Feliz MD (Internal Medicine)  Daniel Landa MD (Gastroenterology)        Problem List  Date Reviewed: 1/15/2020          Codes Class Noted    Type 2 diabetes mellitus with nephropathy [facility]) ICD-10-CM: E11.21  ICD-9-CM: 250.40, 583.81  1/30/2018        H/O alcohol abuse ICD-10-CM: F10.11  ICD-9-CM: 305.03  Unknown    Overview Signed 4/27/2016  6:37 PM by Sahil Gomez     quit few months ago, particpates in [place] [address]             H/O tobacco use, presenting hazards to health ICD-10-CM: Z87.891  ICD-9-CM: V15.82  Unknown    Overview Signed 4/27/2016  6:37 PM by Sahil Gomez     quit few months ago             CAD (coronary artery disease), native coronary artery ICD-10-CM: I25.10  ICD-9-CM: 414.01  Unknown    Overview Addendum 1/3/2014  3:55 PM by Sahil Polanco     p/mLAD 60% ((FFR 0.84). Otherwise mild coronary artery disease (march 2013).  LV EF 65% (echo feb 2013)             Hepatic encephalopathy (HCC) ICD-10-CM: K72.90  ICD-9-CM: 572.2  2/26/2013        CKD (chronic kidney disease) ICD-10-CM: N18.9  ICD-9-CM: 585.9  2/26/2013        Esophageal varices ([facility] ICD-10-CM: I85.00  ICD-9-CM: 456.1  2/26/2013        Cirrhosis ([facility] ICD-10-CM: K74.60  ICD-9-CM: 571.5  2/2/2013    Overview Signed 2/2/2013  5:22 PM by Ce Mckeon MD     Secondary to chronic HCV             Ascites ICD-10-CM: R18.8  ICD-9-CM: 789.59  2/2/2013        Hypertension ICD-10-CM: I10  ICD-9-CM: 401.9  5/23/2011        Ventral hernia, unspecified, without mention of obstruction or gangrene ICD-10-CM: K43.9  ICD-9-CM: 553.20  5/23/2011        Hyperlipidemia ICD-10-CM: E78.5  ICD-9-CM: 272.4  5/23/2011        Type II or unspecified type diabetes mellitus without mention of complication, not stated as uncontrolled ICD-10-CM: E11.9  ICD-9-CM: 250.00  5/23/2011        GERD (gastroesophageal reflux disease) ICD-10-CM: K21.9  ICD-9-CM: 530.81  5/23/2011        St's esophagus ICD-10-CM: K22.70  ICD-9-CM: 530.85  5/23/2011        Chronic hepatitis C ([facility] ICD-10-CM: B18.2  ICD-9-CM: 070.54  5/23/2011    Overview Signed 2/2/2013  5:24 PM by Ce Mckeon MD     Genotype 1  Peginterferon/ribavirin non-response as part of HALT-C clinical trial.    Peginterferon maintenance therapy during HALT-C clinical trial.   Conatus non-responder study. Discontinued secondary to joint pain. Peginterferon/Riba/Boceprevir. Treated for 12 weeks with significant anemia and thrombocytopenia. Non-responder. Thrombocytopenia, secondary to cirrhosis ICD-10-CM: D69.59  ICD-9-CM: 287.49  5/23/2011              Chet Johansen returns to the [place] [address] [facility] 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 The HCV has been treated and cured. The active problem list, all pertinent past medical history, medications, liver histology, endoscopic studies, radiologic findings and laboratory findings related to the liver disorder were reviewed with the patient. Ascites resolved. The patient has not developed hepatic encephalopathy. The patient has esophageal varices without bleeding.       The patient has began, but has not completed liver transplant evaluation testing. The Thallium stress test demonstrated a fixed and 2 small reversible lesions. Cardiac catheterization did not indicate the need for stenting. Mr. Kong Ornelas was noted to have a nodule on his thyroid while being worked up for a pleural effusion. He had a biopsy that was negative for malignancy. The patient had a LI-RADS 4 lesions from abdominal MRI performed in 11/2017. This was worked up and biopsied. Biopsy demonstrated that this lesion is a hemangioma. We have discussed the liver transplant process and various centers he could go to for liver transplantation. He would like to be seen at [place], [place] if this becomes neccessary. Mr. Kong Ornelas completed 12 weeks of treatment with sofosbuvir and simeprevir in June 2014. He had a SVR two years post treatment. Mr. Harriet Tejeda denies any further alcohol use since January 2016. He states that he continues to participate in [place]. Dr. Octavio Miranda has performed HALO procedures for St esophagus. Dr. Octavio Miranda continue to follow the patient. His last EGD was 02/07/2018. Variceal ablation (including the St's mucosa) was performed with 2 of bands placed. He will return to see Dr. Octavio Miranda in 02/2019. Mr. Kong Ornelas is overall feeling well since his last office visit. His lower extremity edema has completely resolved and he is currently not taking any diuretics. He deneis any shortness of breath. He continues to struggle with arthralgias due to osteoarthritis. He does have difficulty completing daily activities due to arthritis. The patient has not experienced problems concentrating, swelling of the abdomen, swelling of the lower extremities, hematemesis, hematochezia. Since the last office appointment the patient has:  Had no changes in the liver disease. Continues in [place]. Had dynamic MRI of the liver completed in 12/2019.       ALLERGIES:  No Known Allergies    Current Outpatient Medications   Medication Sig    testosterone 30 mg/actuation (1.5 mL) slpm by TransDERmal route.  lisinopril (PRINIVIL, ZESTRIL) 5 mg tablet Take 5 mg by mouth daily.  terazosin (HYTRIN) 5 mg capsule Take 5 mg by mouth daily.  atorvastatin (LIPITOR) 10 mg tablet Take 10 mg by mouth every evening.  metoprolol succinate (TOPROL-XL) 50 mg XL tablet Take 50 mg by mouth daily. Indications: HYPERTENSION    glipiZIDE SR (GLUCOTROL) 10 mg CR tablet Take 10 mg by mouth two (2) times a day. Indications: type 2 diabetes mellitus    saxagliptin (ONGLYZA) 5 mg Tab tablet Take 5 mg by mouth daily.  OMEPRAZOLE (PRILOSEC PO) Take 20 mg by mouth daily. Indications: GERD     No current facility-administered medications for this visit. SYSTEM REVIEW NOT RELATED TO LIVER DISEASE OR REVIEWED ABOVE:  Constitution systems: Negative for fever, chills, weight gain, weight loss. Eyes: Negative for visual changes. ENT: Negative for sore throat, painful swallowing. Respiratory: Negative for cough, hemoptysis, SOB. Cardiology: Negative for chest pain, palpitations. GI:  Negative for constipation or diarrhea. : Negative for urinary frequency, dysuria, hematuria, nocturia. Skin: Negative for rash. Hematology: Positive for easy bruising. Negative for blood clots. Musculo-skelatal: Negative for back pain, muscle pain, weakness. Neurologic: Negative for headaches, dizziness, vertigo, memory problems not related to HE. Psychology: Negative for anxiety, depression. FAMILY/SOCIAL HISTORY:  The patient is . The patient has 2 children. Stoped smoking 1/2016. History of heavy alcohol use. Last alcohol consumption was 1/2016. The patient retired in 2010.       PHYSICAL EXAMINATION:  Visit Vitals  BP (!) 168/94 (BP 1 Location: Left arm, BP Patient Position: Sitting)   Pulse (!) 59   Temp 97.5 °F (36.4 °C)   Ht 5' 10\" (1.778 m)   Wt 227 lb (103 kg)   SpO2 98%   BMI 32.57 kg/m² PHYSICAL EXAMINATION:  VS: per nursing note  General: No acute distress. Eyes: Sclera anicteric. ENT: No oral lesions. Thyroid normal.  Nodes: No adenopathy. Skin: No spider angiomata. No jaundice. No palmar erythema. Respiratory: Lungs clear to auscultation. Cardiovascular: Regular heart rate. No murmurs. No JVD. Abdomen: Soft non-tender, liver size normal to percussion/palpation. Spleen not palpable. No obvious ascites. Extremities: No edema. No muscle wasting. No gross arthritic changes. Neurologic: Alert and oriented. Cranial nerves grossly intact. No asterixis. LABORATORY:  [place] [address] [place] [address] 5/29/2019 11/28/2018   WBC 4.6 - 13.2 K/uL 2.8 (L) 2.5 (L)   ANC 1.8 - 8.0 K/UL 2.0 1.8   HGB 13.0 - 16.0 g/dL 12.3 (L) 12.0 (L)    - 420 K/uL 51 (L) 57 (L)   INR 0.8 - 1.2   1.2 1.1   AST 15 - 37 U/L 29 21   ALT 16 - 61 U/L 30 26   Alk Phos 45 - 117 U/L 135 (H) 105   Bili, Total 0.2 - 1.0 MG/DL 0.7 0.7   Bili, Direct 0.0 - 0.2 MG/DL 0.2 0.3 (H)   Albumin 3.4 - 5.0 g/dL 3.7 3.8   BUN 7.0 - 18 MG/DL 30 (H) 31 (H)   Creat 0.6 - 1.3 MG/DL 1.65 (H) 1.33 (H)   Creat (iSTAT) 0.6 - 1.3 MG/DL     Na 136 - 145 mmol/L 139 140   K 3.5 - 5.5 mmol/L 4.6 4.3   Cl 100 - 108 mmol/L 105 107   CO2 21 - 32 mmol/L 27 25   Glucose 74 - 99 mg/dL 170 (H) 124 (H)     Cancer Screening Latest Ref Rng & Units 11/28/2018 5/29/2018 1/30/2018   AFP, Serum 0.0 - 8.0 ng/mL 4.6 6.1 7.6   AFP-L3% 0.0 - 9.9 % 7.8 8.8 7.2     SEROLOGY  2/2012. Anti-HCV negative, CMV IgG positive, EBV IgG positive,     LIVER HISTOLOGY  12/2017. Liver mass biopsy. Hepatocytes show some mild steatosis. The lesion in this case is a hemangioma, and there are several fragments of it along with the liver tissue, characterized by a fibrous background and scattered simple, capillary-like vessels. The architecture is cavernous. There is no evidence of malignancy. 12/2018.   TRANSIENT HEPATIC ELASTOGRAPHY: E Range: 8.9-13.9 kPa  E Mean: 11.5 kPa  E Median: 11.6 kPa  E Std: 1.7 kPa    ENDOSCOPIC PROCEDURES  11/2009:  EGD. Reported to demonstrate varices and Sts esophagus. 12/2010:  EGD. Grade I-II esophageal varices. 12/2010:  Colonoscopy - unremarkable. 3/2013. EGD by Dr Shahriar Camilo. Report requested. 05/27/2014:  EGD per MLS. Multiple medium esophageal varices. Six bands placed. Barrets esophagus (bx negative for malignancy). Repeat in 6 weeks. 09/2014: EGD per MLS. Small esophageal varices. Mild portal hypertensive gastropathy. No gastric varices. Polyp biopsied in the duodenal bulb found to be carcinoid by pathology. Large segment of Barretts esophagus. Biopsy technologically difficult due to bleeding. 02/2015:  EGD per Dr. Ferny Bill. Esophageal varices. Seven bands placed. Portal hypertensive gastropathy. St esophagus. 3/2015: EGD per Dr. Ferny Bill. St esophagus. Portal hypertensive gastropathy. Varices. 4/2015: EGD per Dr. Ferny Bill. Esophageal varices. Seven bands placed. Portal hypertensive gastropathy. St esophagus. Will continue EGDs every four months. Can not perform HALO procedure until varices completley obliterated. 9/2015:  EGD per Dr. Ferny Bill. Grade 1 varices. Four bands applied. Repeat in 6 months. If varices obliterated will perform Halo. 9/2016:  EGD per Dr. Fernanda Ochoa. Small varices in mid esophagus. Distal esophagus with several columns of St's as previously noted. Banding of this mucosa was attempted but bands would not apply. The mucosa was friable and started to bleed actively. 1:10:000 epinephrine was injected (5 ml) at various bleeding sites. This did not control the bleeding. Gold probe cautery was then applies to the bleeding sites and hemostasis was achieved. 11/2016:  EGD with HALO per Dr. Jose De Jesus Landeros. [place] colored mucosa was seen at 34 cm and extended upto 38 cm. No nodules or ulceration was seen.  Scarring from previous band ligation of varices was seen. No varices were seen. Small sliding hiatal hernia was seen. Stomach: Streaky erythema was seen in antrum and was radiating towards the pylorus. No gastric varices were seen Duodenum: normal  2/2017:  EGD with HALO per Dr. Soy Mata. Repeat in 3 months. 05/2017. EGD by Dr. Alan Price. HALO for St's. Repeat in 3 months. 08/2017. EGD by Dr. Alan Price. HALO for St's. Repeat in 3 months. 02/2018. EGD by Dr. Alan Price. Esophagus: Few small 3-4 mm salmon colored islands were seen at the 38 cm. No nodularity was seen. Grade 1 varices were seen. Stomach: Streaky erythema was noted in the antrum radiating towards the pylorus, consistent with gastric antral vascular ectasia. Duodenum/jejunum:normal.  Variceal ablation (including the St's mucosa) was performed with 2 of bands placed. Repeat in one year. 04/2019. EGD by Dr. Alan Price. Irregular Z line was noted. No nodularity was seen. No [place] colored islands were noted. No varices were seen. Scarring from previous band ligation was noted. Repeat in one year. RADIOLOGY  1/2009:  Ultrasound liver - echogenic consistent with cirrhosis, no liver mass lesions, no ascites. 11/2010:  Abdominal ultrasound - no focal liver lesions noted, no ascites. 03/2012:  Ultrasound of the liver. Echogenic consistent with chronic liver disease, cirrhosis. No liver mass lesions. No ascites. 09/2012: Ultrasound of the liver. Echogenic consistent with chronic liver disease, cirrhosis. No liver mass lesions. No ascites. 2/12:  Chest x-ray PA and Lateral:  Normal heart size without consolidation. Band of atelectasis or scarring noted in the retrosternal region. There is blunting on the right CP angle due to small effusion. 2/2013:  Ultrasound of liver. Echogenic consistent with cirrhosis. 2.5 x 1.7 lesion in left lobe. Appears larger than on previous US. No dilated bile ducts. No ascites. 2/2013. MRI abdomen. No contrast given. Changes consistent with cirrhosis. No liver mass lesions. No dilated bile ducts. No bile duct strictures. No ascites. 03/2013 - Triple phase CT of abdomen. Hypervascular mass in left lobe stable and consistent with hemangioma. Stable non enhancing mass in segment 4/5. Large right pleural effusion. 08/2013: Ultrasound of the liver. Echogenic consistent with cirrhosis. No ascites. No bile duct strictures. 4.5 x 1.9 x 2.1 hyperechoic circumscribed structure similar to to CT which indicated hemangioma. 02/2014: The previously documented hemangioma within the subcapsular left hepatic lobe  is not identified on this exam. Hyperechoic mass within segment V/IV is identified, shows some interval increase since comparison ultrasound (maximum diameter currently 3.2 cm, previously 2.5 cm) but prior MRI suggests this represents a regenerative nodule. Second hyperechoic masslike area is now seen at subcapsular anterior right hepatic lobe measuring 3 cm maximally. This lesion is not clearly identified on prior ultrasound or MRI. Regenerative nodule is certainly possible but the finding is nonspecific.  04/2014:  Abdominal MRI. 2 cm lesion in the right hepatic lobe demonstrating faint arterial enhancement and early washout, concerning for hepatocellular carcinoma. 10/2014: Dynamic CT of the abdomen. Cirrhosis of the liver and evidence for portal hypertension with hepatomegaly and varices. Probable hemangioma although now with slightly atypical appearance in the left lobe of the liver lateral segment is not significantly changed in the interval in size as far back as 2011. This is an atypical appearance and behavior for hepatocellular carcinoma. 3. Probable focal fibrosis or regenerative nodule in the interlobar fissure. 3/2015: Ultrasound of the liver. A relatively discrete nodule is present in the right lobe measuring 3.2 x 2.2 x 2.2 cm.  This is visible in retrospect on the prior study of 2014 February and is not hypervascular or well-defined on the following CT and MR studies. 11/2015: Abdominal ultrasound: Nodule in right hepatic lobe unchanged from previous imaging. No new lesions. No ascites. 01/2016: Ultrasound of liver. Echogenic consistent with cirrhosis. No liver mass lesions. No dilated bile ducts. No ascites. 9/2016: Ultrasound of the liver. Heterogeneous echotexture and lobulated contour of the liver, consistent with reported cirrhosis. Echogenic right hepatic mass is grossly stable given slight differences in technique/positioning. No new hepatic mass was seen. Additional previously seen left hepatic mass is not visualized on today's exam.  04/2017. Ultrasound of the liver. Redemonstrated heterogeneous echotexture and lobulated contour of the liver, compatible with history of cirrhosis. Stable echogenic mass in the right hepatic lobe. No new mass identified. Previously seen mass in the left hepatic lobe on prior CT scan remains inconspicuous by ultrasound. 11/2017. Abdominal MRI w/wo contrast.  Hepatic cirrhosis. Li-RADS 4 lesion in hepatic segment 3 (probable hepatocellular carcinoma). Faint arterial phase hyperenhancement throughout the left hepatic lobe. While infiltrative lesion not absolutely excluded, suspect finding reflects intralesional shunting related to the lesion. Scattered additional Li-RADS 3 hepatic lesions (indeterminate). 12/2017. Abdominal CT w/wo contrast. Complex hypervascular lesion in the periphery of segment 3 of the left hepatic lobe as described and measured above. Minimal interval increase in size. LI-RADS 4. Developing hepatoma is most likely. 06/2018. Ultrasound of the liver. Cirrhosis. Ovoid hyperechoic nodule in the right lobe, unchanged in the interval. Prior biopsy confirmed this as a hemangioma. 12/2018. Ultrasound of the liver.   Coarsely heterogeneous echotexture with a nodular contour and prominent caudate lobe, in keeping with history of cirrhosis. Redemonstrated hyperechoic mass measuring 1.4 x 1.3 x 1.0 cm and the left hepatic lobe, in keeping with a hemangioma demonstrated on prior studies which was previously biopsied. 07/2019. Dynamic MRI of the liver. Slowly growing 2.7 cm mass without arterial enhancement, with washout in hepatic segment IVb. This mass measured up to 2.1 cm thousand 17 and 1.5 cm 2014. LIRADS 4. Fibrotic changes within the right liver have progressed since 2017. Hemangioma and associated shunting within the left lateral liver are unchanged since 2017.  12/2019. Dynamic MRI of the liver. Cirrhosis with no significant change in size or appearance of previously described T2 hypointense nodule in the right hepatic lobe. Given stability as well as current imaging features, this most likely represents a nodular area of liver parenchyma which is accentuated by the adjacent background of confluent fibrosis rather than a true mass. LR-2. Left hepatic lobe hemangioma and adjacent heterogeneous enhancement again identified, unchanged. LR-1. No new suspicious nodules or masses identified in the liver. LIVER TRANSPLANT EVALUATION TESTING  2/2013. Blood ETOH positive. 2/2013. TSH 2.8, T3 112, T4 free 2.0  2/103. PSA 0.5  2/103. TB Quantiferon negative  2/2013. Blood type O+  2/2013. ECHO. Normal wall motion. LVEF 65%. No valve abnormalities. Trivial TR.  2/2013. FEV1 108% of predicted, FEV1% 103% of predicted, DLCO **% of predicted. 2/2013. Lexicon stress test.  LVEF 74%. Fixed inferior defect with normal wall motion. Small reversible defect at apex and in distal inferolateral apical wall.  02/2013:  DEXA scan - Osteopenia. 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    The most recent laboratory studies indicate that the liver transaminases are normal, alkaline phosphatase is elevated, tests of hepatic synthetic and metabolic function are normal, and the platelet count is depressed. Will perform laboratory testing to monitor liver function and degree of liver injury. This will include hepatic panel, a CBC w/ diff, a BMP, a PT/INR, and an AFP-L3%. Complications of cirrhosis were discussed in detail. We discussed thrombocytopenia, portal hypertension, varices, GI bleeding, peripheral edema, ascites, hepatic encephalopathy, and hepatocellular carcinoma. We discussed the need for follow ups on a regular basis to monitor for complications. We discussed the need for every 6 month liver imaging studies. Chronic HCV, genotype 1. He completed twelve weeks of treatment with simeprevir and sofosbuvir in June 2014. He had a SVR 2 years post treatment. Peripheral edema. Resolved. Esophageal varicies without prior bleeding. He has had repeat EGDs with HALO procedure performed. No varices identified. He returns to Dr. Nadeem Allred annually. Hepatic encephalopathy has not developed to date. There is no need for treatment with lactulose and/or xifaxan at this time. ETOH abuse. He has not consumed any alcohol since 01/2016. Continue AA. Received his 4 year sobriety medallion today. The patient had a postive thalium stress test with reversible ischemia. He has a cardiac catheterization. No significant ASCAD identified. Thrombocytopenia secondary to cirrhosis. There is no evidence of overt bleeding. There is no indication for platelet transfusions or pharmacologic treatment to increase the platelet count. [address] screening. Recent imaging suggested that Mr. Mynor House had developed [address] He was referred to Dr. Cata Vega for evaluation. The LI-RADS 4 lesion was biopsied and determined to be a hemangioma. The plan was to continue with advanced imaging, either MRI or CT, every 6 months moving forward. Lesion now classified as LI-RADS 2. We will therefore perform ultrasound alternating with advanced imaging at 6 month intervals.   These, along with AFP's every 6 months, should suffice for [facility] screenings. Ultrasound was ordered today to be performed in 06/2020. All of the above issues were discussed with the patient. All questions were answered. The patient expressed a clear understanding of the above. 25 minutes total time spent with this patient with more than 50% of this time spent counseling and coordinating care as described above. [email or web address] in 3 months.       Farshad Cardona, FNP-C  [facility]iver [place] [facility]  [address] [facility], [address] [place] [address] Otilia Valles, [address] [facility]   [phone]

## 2020-01-16 LAB
AFP L3 MFR SERPL: NORMAL % (ref 0–9.9)
AFP SERPL-MCNC: 4.4 NG/ML (ref 0–8)

## 2020-01-27 ENCOUNTER — OFFICE VISIT (OUTPATIENT)
Dept: CARDIOLOGY CLINIC | Age: 65
End: 2020-01-27

## 2020-01-27 VITALS
DIASTOLIC BLOOD PRESSURE: 76 MMHG | HEART RATE: 62 BPM | SYSTOLIC BLOOD PRESSURE: 130 MMHG | WEIGHT: 226 LBS | BODY MASS INDEX: 32.35 KG/M2 | HEIGHT: 70 IN | OXYGEN SATURATION: 98 %

## 2020-01-27 DIAGNOSIS — I25.10 CORONARY ARTERY DISEASE INVOLVING NATIVE CORONARY ARTERY OF NATIVE HEART WITHOUT ANGINA PECTORIS: ICD-10-CM

## 2020-01-27 DIAGNOSIS — E78.49 OTHER HYPERLIPIDEMIA: ICD-10-CM

## 2020-01-27 DIAGNOSIS — I10 ESSENTIAL HYPERTENSION: ICD-10-CM

## 2020-01-27 DIAGNOSIS — R07.9 CHEST PAIN, UNSPECIFIED TYPE: Primary | ICD-10-CM

## 2020-01-27 DIAGNOSIS — N18.9 CHRONIC KIDNEY DISEASE, UNSPECIFIED CKD STAGE: ICD-10-CM

## 2020-01-27 DIAGNOSIS — E11.9 CONTROLLED TYPE 2 DIABETES MELLITUS WITHOUT COMPLICATION, WITHOUT LONG-TERM CURRENT USE OF INSULIN (HCC): ICD-10-CM

## 2020-01-27 NOTE — PROGRESS NOTES
HPI: A 77-year old male here to reestablish care with cardiology. He was last seen by Dr. James Valderrama April 2017. He has a known 60% lesion of the LAD January 2014 by catheterization. For the past month or so he has had increasing exertional chest pressure. He denies robert pain, no significant dyspnea. He has a long history of cirrhosis and actually evaluated for a liver transplant in the past but he stopped drinking about four years ago. He was told by his hepatologist that his liver is improved. He was told that it is okay to take a statin and he is on Lipitor. His platelets however have been historically on the low side and he is hesitant to take an aspirin. They were 51,000 1/15/20. He also has a history of St's esophagus and banding, both of which are stable. Impression/Plan:  1. Recent increase in chest pain with known coronary artery disease by heart catheterization 2014 with 60% LAD lesion, FFR was 0.84. He was medically treated at that time. 2. Hypertension with recent increase in lisinopril from 5 to 20 mg daily, but still occasionally increased in the morning. 3. Chronic kidney disease with last creatinine 1.5.  4. History of cirrhosis with previous liver transplant workup. However, he stopped alcohol and his cirrhosis has stabilized. 5. Remote St's with last EGD April 2019, now planning annual follow up. 6. Dyslipidemia. He is able to tolerate statin. 7. Degenerative joint disease. 8. Chronic thrombocytopenia at 51,000 1/15/20. Given his history of cirrhosis and liver disease which is now quiescent, he is hesitant to start aspirin. He is already on a statin. His clinical history is most consistent with worsening coronary artery disease and I therefore asked for a stress test. If it is intermediate or high risk, I recommended we proceed to heart catheterization.  In regard to bleeding issues, he had an EGD April 2019 and he tells me everything has been stable since then and they moved to an annual basis. His cirrhosis has stabilized and he has not been smoking or drinking for at least four years. All questions answered. I have asked him to continue to monitor his blood pressure and minimize salt intake. If AM BP remains elevated, I asked him to take his lisinopril and Toprol in the evening. Past Medical History:   Diagnosis Date    St's esophagus 5/23/2011    CAD (coronary artery disease), native coronary artery     p/mLAD 60% ((FFR 0.84). Otherwise mild coronary artery disease.  Chronic hepatitis C without mention of hepatic coma, genotype 1 5/23/2011    Chronic pain     back    Cirrhosis (HCC)     GERD (gastroesophageal reflux disease) 5/23/2011    H/O alcohol abuse     quit few months ago, particpates in [place] [address]    H/O tobacco use, presenting hazards to health     quit few months ago    History of echocardiogram 02/19/2013    EF 65%. No reg'l WMA. RVSP 25 mmHg.  History of myocardial perfusion scan 02/19/2013    Fixed inferior defect, likely artifact. Sm, mild reversible apical defect concerning for mild ischemia; could be apical thinning. Sm reversible distal inferolateral apical wall. Sm distal inferolateral, apical, & inferoapical defect possibly ischemia. EF 74%. No WMA. Neg EKG on pharm stress test.    Hyperlipidemia 5/23/2011    Hypertension 20yrs+- 1990's    Liver disease     chronic hep c   -non detectable since 2013    OA (osteoarthritis)     Other ill-defined conditions(799.89)     triglycerides    S/P cardiac cath 03/28/2013    p/mLAD 60% ((FFR 0.84). Otherwise mild coronary artery disease. RA 9.  RV 48/14. PA 50/20. W 25. COCI:  6.5/3.2 (TD) and 6.8/3.2 (Ingrid).       Thrombocytopenia, secondary to cirrhosis 5/23/2011    Type II or unspecified type diabetes mellitus without mention of complication, not stated as uncontrolled 2007    Ventral hernia, unspecified, without mention of obstruction or gangrene 5/23/2011       Current Outpatient Medications   Medication Sig Dispense Refill    anastrozole (ARIMIDEX) 1 mg tablet Take 1 mg by mouth every seven (7) days.  baclofen (LIORESAL) 10 mg tablet Take 10 mg by mouth.  lisinopril (PRINIVIL, ZESTRIL) 5 mg tablet Take 20 mg by mouth daily.  terazosin (HYTRIN) 5 mg capsule Take 5 mg by mouth daily.  atorvastatin (LIPITOR) 10 mg tablet Take 20 mg by mouth daily.  metoprolol succinate (TOPROL-XL) 50 mg XL tablet Take 50 mg by mouth daily. Indications: HYPERTENSION  0    glipiZIDE SR (GLUCOTROL) 10 mg CR tablet Take 10 mg by mouth two (2) times a day. Indications: type 2 diabetes mellitus      saxagliptin (ONGLYZA) 5 mg Tab tablet Take 5 mg by mouth daily.  OMEPRAZOLE (PRILOSEC PO) Take 20 mg by mouth daily. Indications: GERD      testosterone 30 mg/actuation (1.5 mL) slpm by TransDERmal route. Social History   reports that he quit smoking about 4 years ago. He has a 12.50 pack-year smoking history. He has never used smokeless tobacco.   reports no history of alcohol use. Family History  family history includes Cancer in his father; Diabetes in his mother and sister; HIV/AIDS in his brother; Heart Failure in his mother and sister; Hypertension in his mother; Other in his mother. Review of Systems  Except as stated above include:  Constitutional: Negative for fever, chills and malaise/fatigue. HEENT: No congestion or recent URI. Gastrointestinal: No nausea, vomiting, abdominal pain, bloody stools. Pulmonary:  Negative except as stated above. Cardiac:  Negative except as stated above. Musculoskeletal: Negative except as stated above. Neurological:  No localized symptoms. Skin:  Negative except as stated above. Psych:  Negative except as stated above. Endocrine:  Negative except as stated above.     PHYSICAL EXAM  BP Readings from Last 3 Encounters:   01/27/20 130/76   01/15/20 (!) 168/94   05/29/19 151/90     Pulse Readings from Last 3 Encounters:   01/27/20 62   01/15/20 (!) 59   05/29/19 73     Wt Readings from Last 3 Encounters:   01/27/20 102.5 kg (226 lb)   01/15/20 103 kg (227 lb)   12/11/19 98.9 kg (218 lb)     General:   Well developed, well groomed. Head/Neck:   No jugular venous distention     No carotid bruits. No evidence of xanthelasma. Lungs:   No respiratory distress. Clear bilaterally. Heart:    Regular rate and rhythm. Normal S1/S2. Palpation of heart with normal point of maximum impulse. No significant murmurs, rubs or gallops. Abdomen:   Soft and nontender. No palpable abdominal mass or bruits. Extremities:   Intact peripheral pulses. No significant edema. Neurological:   Alert and oriented to person, place, time. No focal neurological deficit visually. Skin:   No obvious rash    Blood Pressure Metric:  Monitor recommended and adjustments stated if needed.

## 2020-01-27 NOTE — PATIENT INSTRUCTIONS
Pharm nuc for chest pain  Follow up 4-6 weeks with Siva Forrest  Follow up 6 months with Dr. Kelli Brandt     If you have not heard from the central scheduler to schedule your testing in 48 hours, please call [phone].

## 2020-01-27 NOTE — PROGRESS NOTES
Flora Vieira presents today for   Chief Complaint   Patient presents with    Coronary Artery Disease     Previous patient of Dr. Gomez i[place] and [place]    Chest Pain (Angina)     tightness when patient first starts walking for one year        Flora Vieira preferred language for health care discussion is english/other. Is someone accompanying this pt? no    Is the patient using any DME equipment during [address]? no    Depression Screening:  3 most recent PHQ Screens 1/15/2020   Little interest or pleasure in doing things Not at all   Feeling down, depressed, irritable, or hopeless Not at all   Total Score PHQ 2 0       Learning Assessment:  Learning Assessment 12/4/2017   PRIMARY LEARNER Patient   HIGHEST LEVEL OF EDUCATION - PRIMARY LEARNER  -   BARRIERS PRIMARY LEARNER NONE   CO-LEARNER CAREGIVER No   PRIMARY LANGUAGE ENGLISH   LEARNER PREFERENCE PRIMARY LISTENING     -     -   LEARNING SPECIAL TOPICS -   ANSWERED BY patient   RELATIONSHIP SELF       Abuse Screening:  No flowsheet data found. Fall Risk  Fall Risk Assessment, last 12 mths 5/29/2019   Able to walk? Yes   Fall in past 12 months? No       Pt currently taking Anticoagulant therapy? no    Coordination of Care:  1. Have you been to the ER, urgent care clinic since your last visit? Hospitalized since your last visit? no    2. Have you seen or consulted any other health care providers outside of the [address] [place] [address] since your last visit? Include any pap smears or colon screening.  no

## 2020-01-30 ENCOUNTER — TELEPHONE (OUTPATIENT)
Dept: CARDIOLOGY CLINIC | Age: 65
End: 2020-01-30

## 2020-01-30 NOTE — TELEPHONE ENCOUNTER
Received call from patient asking why is avs showed CKD(chronic kidney disease) as one of his diagnosis. Reviewed with Porfirio. Patient's creatinine level has been above the normal range in several results and this is why this diagnosis is on his list. Spoke with patient and this was explained. No further questions. Patient states he will be having his stress test tomorrow.

## 2020-01-31 ENCOUNTER — HOSPITAL ENCOUNTER (OUTPATIENT)
Dept: NUCLEAR MEDICINE | Age: 65
Discharge: HOME | End: 2020-01-31
Attending: INTERNAL MEDICINE
Payer: MEDICARE

## 2020-01-31 ENCOUNTER — HOSPITAL ENCOUNTER (OUTPATIENT)
Dept: NON INVASIVE DIAGNOSTICS | Age: 65
Discharge: HOME | End: 2020-01-31
Attending: INTERNAL MEDICINE
Payer: MEDICARE

## 2020-01-31 VITALS
WEIGHT: 218 LBS | DIASTOLIC BLOOD PRESSURE: 70 MMHG | HEIGHT: 70 IN | SYSTOLIC BLOOD PRESSURE: 138 MMHG | BODY MASS INDEX: 31.21 KG/M2

## 2020-01-31 DIAGNOSIS — R07.9 CHEST PAIN, UNSPECIFIED TYPE: ICD-10-CM

## 2020-01-31 LAB
STRESS ANGINA INDEX: 0
STRESS BASELINE HR: 66 BPM
STRESS ESTIMATED WORKLOAD: 9.3 METS
STRESS EXERCISE DUR MIN: NORMAL
STRESS PEAK DIAS BP: 85 MMHG
STRESS PEAK SYS BP: 229 MMHG
STRESS PERCENT HR ACHIEVED: 86 %
STRESS POST PEAK HR: 133 BPM
STRESS RATE PRESSURE PRODUCT: NORMAL BPM*MMHG
STRESS SR DUKE TREADMILL SCORE: 0
STRESS ST DEPRESSION: 0 MM
STRESS ST ELEVATION: 0 MM
STRESS TARGET HR: 155 BPM

## 2020-01-31 PROCEDURE — 93017 CV STRESS TEST TRACING ONLY: CPT

## 2020-01-31 PROCEDURE — A9500 TC99M SESTAMIBI: HCPCS

## 2020-02-23 NOTE — PROGRESS NOTES
Nicola Gomez presents today for follow-up. When seen by Dr. Wendi Loza in late Jan. 2020 to re-establish care with cardiology, he reported increasing episodes of exertional chest pressure. A stress test was ordered and completed on 1/31/20. It showed no convincing evidence of significant reversible defect to suggest on going major ischemia. There was a small defect that was predominantly fixed. He is a 71year old male with history of CAD (known 60% lesion of the LAD January 2014 by catheterization), hypertension, dyslipidemia, St's esophagus, chronic kidney disease, cirrhosis of the liver (with previous transplant work-up but after abstaining from ETOH, cirrhosis stabilized), DJD, and chronic thrombocytopenia. His last echo was done in Feb. 2013 and it showed an EF of 65% and no WMA. He states that he has had only one episode of chest pressure since his appointment with Dr Wendi Loza. He has been able to walk his dog without any difficulties. He continues to work 2 days a week. Denies chest pain, tightness, heaviness, and palpitations. Denies shortness of breath at rest, dyspnea on exertion, orthopnea and PND. Denies abdominal bloating. Denies lightheadedness, dizziness, and syncope. Denies lower extremity edema and claudication. Denies nausea, vomiting, diarrhea, melena, hematochezia. Denies hematuria, urgency, frequency. Denies fever, chills. PMH:  Past Medical History:   Diagnosis Date    St's esophagus 5/23/2011    CAD (coronary artery disease), native coronary artery     p/mLAD 60% ((FFR 0.84). Otherwise mild coronary artery disease.      Chronic hepatitis C without mention of hepatic coma, genotype 1 5/23/2011    Chronic pain     back    Cirrhosis (HCC)     GERD (gastroesophageal reflux disease) 5/23/2011    H/O alcohol abuse     quit few months ago, particpates in [place] [address]    H/O tobacco use, presenting hazards to health     quit few months ago    History of echocardiogram 02/19/2013    EF 65%. No reg'l WMA. RVSP 25 mmHg.  History of myocardial perfusion scan 02/19/2013    Fixed inferior defect, likely artifact. Sm, mild reversible apical defect concerning for mild ischemia; could be apical thinning. Sm reversible distal inferolateral apical wall. Sm distal inferolateral, apical, & inferoapical defect possibly ischemia. EF 74%. No WMA. Neg EKG on pharm stress test.    Hyperlipidemia 5/23/2011    Hypertension 20yrs+- 1990's    Liver disease     chronic hep c   -non detectable since 2013    OA (osteoarthritis)     Other ill-defined conditions(799.89)     triglycerides    S/P cardiac cath 03/28/2013    p/mLAD 60% ((FFR 0.84). Otherwise mild coronary artery disease. RA 9.  RV 48/14. PA 50/20. W 25. COCI:  6.5/3.2 (TD) and 6.8/3.2 (Ingrid).  Thrombocytopenia, secondary to cirrhosis 5/23/2011    Type II or unspecified type diabetes mellitus without mention of complication, not stated as uncontrolled 2007    Ventral hernia, unspecified, without mention of obstruction or gangrene 5/23/2011       PSH:  Past Surgical History:   Procedure Laterality Date    HX CATARACT REMOVAL      bilateral    HX COLECTOMY      diverticulitis    HX COLOSTOMY      then reversable    HX GI  December 2010    endoscopy    HX GI      bowel resection - one surgery as of 3/11/2015    HX GI      EGD with halo    HX HEART CATHETERIZATION  3/28/2013    no stents    HX HERNIA REPAIR      3x ventral hernia, 1 testicular    HX ORTHOPAEDIC  1985    right wrist fracture (old injury) - and right carpal tunnel    HX ORTHOPAEDIC Right 08/2016    wrist surgery - fusion and second carpal tunnel       MEDS:  Current Outpatient Medications   Medication Sig    lisinopril (PRINIVIL, ZESTRIL) 20 mg tablet Take 20 mg by mouth every evening.  anastrozole (ARIMIDEX) 1 mg tablet Take 1 mg by mouth every seven (7) days.  baclofen (LIORESAL) 10 mg tablet Take 10 mg by mouth.     testosterone 30 mg/actuation (1.5 mL) slpm by TransDERmal route.  terazosin (HYTRIN) 5 mg capsule Take 5 mg by mouth daily.  atorvastatin (LIPITOR) 10 mg tablet Take 20 mg by mouth daily.  metoprolol succinate (TOPROL-XL) 50 mg XL tablet Take 50 mg by mouth daily. Indications: HYPERTENSION    glipiZIDE SR (GLUCOTROL) 10 mg CR tablet Take 10 mg by mouth two (2) times a day. Indications: type 2 diabetes mellitus    saxagliptin (ONGLYZA) 5 mg Tab tablet Take 5 mg by mouth daily.  OMEPRAZOLE (PRILOSEC PO) Take 20 mg by mouth daily. Indications: GERD     No current facility-administered medications for this visit. Allergies and Sensitivities:  No Known Allergies    Family History:  Family History   Problem Relation Age of Onset    Diabetes Mother     Hypertension Mother     Other Mother         Gout    Heart Failure Mother     Cancer Father         carcinoma left lung    Diabetes Sister     Heart Failure Sister     HIV/AIDS Brother        Social History:  He  reports that he quit smoking about 4 years ago. He has a 12.50 pack-year smoking history. He has never used smokeless tobacco.  He  reports no history of alcohol use. Physical:  Visit Vitals  /78 (BP 1 Location: Right arm, BP Patient Position: Sitting)   Pulse (!) 50   Ht 5' 10\" (1.778 m)   Wt 97.1 kg (214 lb)   SpO2 99%   BMI 30.71 kg/m²         Exam:  Neck:  Supple, no JVD, no carotid bruits  CV:  Normal S1 and  S2, no murmurs, rubs, or gallops noted  Lungs:  Clear to ausculation throughout, no wheezes or rales  Abd:  Soft, non-tender, non-distended with good bowel sounds.   No hepatosplenomegaly  Extremities:  No edema      Data:  EKG:      LABS:  Lab Results   Component Value Date/Time    Sodium 141 01/15/2020 10:16 AM    Potassium 4.9 01/15/2020 10:16 AM    Chloride 109 01/15/2020 10:16 AM    CO2 31 01/15/2020 10:16 AM    Glucose 146 (H) 01/15/2020 10:16 AM    BUN 27 (H) 01/15/2020 10:16 AM    Creatinine 1.55 (H) 01/15/2020 10:16 AM     Lab Results   Component Value Date/Time    Cholesterol, total 113 03/28/2013 02:00 PM    HDL Cholesterol 24 (L) 03/28/2013 02:00 PM    LDL, calculated 74.4 03/28/2013 02:00 PM    Triglyceride 73 03/28/2013 02:00 PM    CHOL/HDL Ratio 4.7 03/28/2013 02:00 PM     Lab Results   Component Value Date/Time    ALT (SGPT) 29 01/15/2020 10:16 AM         Impression/Plan:  1.  CAD, known 60% lesion of the LAD January 2014 by catheterization  2. Essential hypertension, blood pressure stable  3. Dyslipidemia, on atorvastatin 10mg  4. Chronic kidney disease  5. Cirrhosis, stable after abstaining from ETOH  6. St's esophagus  7. Chronic thrombocytopenia    Mr. Mali Andrade was seen today for follow-up after having a pharmacologic nuclear stress test done. It was completed on 1/31/20 and it showed no convincing evidence of significant reversible defect to suggest on going major ischemia. There was a small defect that was predominantly fixed. Results were discussed with him. He states that he has had only one episode of chest pressure since his last visit. He has been able to walk his dog and work 2 days a week without any difficulties. At this time, no further testing appears to be necessary. I asked that he call if the chest pressure increases in frequency or intensity. He will follow-up with Dr Arpita Sparrow as scheduled and as needed. Shaneka Hannon MSN, FNP-BC    Please note:  Portions of this chart were created with Dragon medical speech to text program.  Unrecognized errors may be present.

## 2020-03-02 ENCOUNTER — OFFICE VISIT (OUTPATIENT)
Dept: CARDIOLOGY CLINIC | Age: 65
End: 2020-03-02

## 2020-03-02 VITALS
OXYGEN SATURATION: 99 % | HEART RATE: 50 BPM | SYSTOLIC BLOOD PRESSURE: 140 MMHG | WEIGHT: 214 LBS | DIASTOLIC BLOOD PRESSURE: 78 MMHG | HEIGHT: 70 IN | BODY MASS INDEX: 30.64 KG/M2

## 2020-03-02 DIAGNOSIS — I10 ESSENTIAL HYPERTENSION: ICD-10-CM

## 2020-03-02 DIAGNOSIS — I25.10 CORONARY ARTERY DISEASE INVOLVING NATIVE CORONARY ARTERY OF NATIVE HEART WITHOUT ANGINA PECTORIS: Primary | ICD-10-CM

## 2020-03-02 DIAGNOSIS — E78.49 OTHER HYPERLIPIDEMIA: ICD-10-CM

## 2020-03-02 RX ORDER — NITROGLYCERIN 0.4 MG/1
0.4 TABLET SUBLINGUAL
Qty: 25 TAB | Refills: 3 | Status: SHIPPED | OUTPATIENT
Start: 2020-03-02 | End: 2022-01-24

## 2020-03-02 RX ORDER — LISINOPRIL 20 MG/1
10 TABLET ORAL EVERY EVENING
COMMUNITY
End: 2021-07-19 | Stop reason: ALTCHOICE

## 2020-03-02 NOTE — PROGRESS NOTES
Meredith Shane presents today for   Chief Complaint   Patient presents with    Coronary Artery Disease     4 to 6 week follow up, no cardiac complaints       Meredith Shane preferred language for health care discussion is english/other. Is someone accompanying this pt? no    Is the patient using any DME equipment during [address]? no    Depression Screening:  3 most recent PHQ Screens 3/2/2020   Little interest or pleasure in doing things Not at all   Feeling down, depressed, irritable, or hopeless Not at all   Total Score PHQ 2 0       Learning Assessment:  Learning Assessment 12/4/2017   PRIMARY LEARNER Patient   HIGHEST LEVEL OF EDUCATION - PRIMARY LEARNER  -   BARRIERS PRIMARY LEARNER NONE   CO-LEARNER CAREGIVER No   PRIMARY LANGUAGE ENGLISH   LEARNER PREFERENCE PRIMARY LISTENING     -     -   LEARNING SPECIAL TOPICS -   ANSWERED BY patient   RELATIONSHIP SELF       Abuse Screening:  No flowsheet data found. Fall Risk  Fall Risk Assessment, last 12 mths 3/2/2020   Able to walk? Yes   Fall in past 12 months? No       Pt currently taking Anticoagulant therapy? no    Coordination of Care:  1. Have you been to the ER, urgent care clinic since your last visit? Hospitalized since your last visit? no    2. Have you seen or consulted any other health care providers outside of the [address] [place] [address] since your last visit? Include any pap smears or colon screening.  no

## 2020-03-02 NOTE — PATIENT INSTRUCTIONS
Continue present medication regimen  Will call you if any changes need to be made  Sublingual nitroglycerin prescription sent to your pharmacy  Follow-up with Dr Evaristo Sargent as scheduled and as needed

## 2020-07-15 ENCOUNTER — HOSPITAL ENCOUNTER (OUTPATIENT)
Dept: LAB | Age: 65
Discharge: HOME | End: 2020-07-15
Payer: MEDICARE

## 2020-07-15 ENCOUNTER — OFFICE VISIT (OUTPATIENT)
Dept: HEMATOLOGY | Age: 65
End: 2020-07-15

## 2020-07-15 VITALS
DIASTOLIC BLOOD PRESSURE: 90 MMHG | BODY MASS INDEX: 27.52 KG/M2 | HEIGHT: 70 IN | TEMPERATURE: 95 F | HEART RATE: 50 BPM | SYSTOLIC BLOOD PRESSURE: 183 MMHG | WEIGHT: 192.25 LBS | OXYGEN SATURATION: 99 %

## 2020-07-15 DIAGNOSIS — K74.60 CIRRHOSIS OF LIVER WITHOUT ASCITES, UNSPECIFIED HEPATIC CIRRHOSIS TYPE (HCC): Primary | ICD-10-CM

## 2020-07-15 DIAGNOSIS — K74.60 CIRRHOSIS OF LIVER WITHOUT ASCITES, UNSPECIFIED HEPATIC CIRRHOSIS TYPE (HCC): ICD-10-CM

## 2020-07-15 LAB
ALBUMIN SERPL-MCNC: 4.1 G/DL (ref 3.4–5)
ALBUMIN/GLOB SERPL: 1.2 {RATIO} (ref 0.8–1.7)
ALP SERPL-CCNC: 109 U/L (ref 45–117)
ALT SERPL-CCNC: 37 U/L (ref 16–61)
ANION GAP SERPL CALC-SCNC: 4 MMOL/L (ref 3–18)
AST SERPL-CCNC: 36 U/L (ref 10–38)
BASOPHILS # BLD: 0 K/UL (ref 0–0.1)
BASOPHILS NFR BLD: 0 % (ref 0–2)
BILIRUB DIRECT SERPL-MCNC: 0.2 MG/DL (ref 0–0.2)
BILIRUB SERPL-MCNC: 0.7 MG/DL (ref 0.2–1)
BUN SERPL-MCNC: 38 MG/DL (ref 7–18)
BUN/CREAT SERPL: 27 (ref 12–20)
CALCIUM SERPL-MCNC: 9.6 MG/DL (ref 8.5–10.1)
CHLORIDE SERPL-SCNC: 109 MMOL/L (ref 100–111)
CO2 SERPL-SCNC: 28 MMOL/L (ref 21–32)
CREAT SERPL-MCNC: 1.43 MG/DL (ref 0.6–1.3)
DIFFERENTIAL METHOD BLD: ABNORMAL
EOSINOPHIL # BLD: 0.1 K/UL (ref 0–0.4)
EOSINOPHIL NFR BLD: 5 % (ref 0–5)
ERYTHROCYTE [DISTWIDTH] IN BLOOD BY AUTOMATED COUNT: 14 % (ref 11.6–14.5)
GLOBULIN SER CALC-MCNC: 3.3 G/DL (ref 2–4)
GLUCOSE SERPL-MCNC: 86 MG/DL (ref 74–99)
HCT VFR BLD AUTO: 37.9 % (ref 36–48)
HGB BLD-MCNC: 12 G/DL (ref 13–16)
INR PPP: 1.2 (ref 0.8–1.2)
LYMPHOCYTES # BLD: 0.4 K/UL (ref 0.9–3.6)
LYMPHOCYTES NFR BLD: 15 % (ref 21–52)
MCH RBC QN AUTO: 27.9 PG (ref 24–34)
MCHC RBC AUTO-ENTMCNC: 31.7 G/DL (ref 31–37)
MCV RBC AUTO: 88.1 FL (ref 74–97)
MONOCYTES # BLD: 0.2 K/UL (ref 0.05–1.2)
MONOCYTES NFR BLD: 9 % (ref 3–10)
NEUTS SEG # BLD: 1.9 K/UL (ref 1.8–8)
NEUTS SEG NFR BLD: 71 % (ref 40–73)
PLATELET # BLD AUTO: 58 K/UL (ref 135–420)
PMV BLD AUTO: 13 FL (ref 9.2–11.8)
POTASSIUM SERPL-SCNC: 5.1 MMOL/L (ref 3.5–5.5)
PROT SERPL-MCNC: 7.4 G/DL (ref 6.4–8.2)
PROTHROMBIN TIME: 15.4 SEC (ref 11.5–15.2)
RBC # BLD AUTO: 4.3 M/UL (ref 4.7–5.5)
SODIUM SERPL-SCNC: 141 MMOL/L (ref 136–145)
WBC # BLD AUTO: 2.6 K/UL (ref 4.6–13.2)

## 2020-07-15 PROCEDURE — 85025 COMPLETE CBC W/AUTO DIFF WBC: CPT

## 2020-07-15 PROCEDURE — 80048 BASIC METABOLIC PNL TOTAL CA: CPT

## 2020-07-15 PROCEDURE — 85610 PROTHROMBIN TIME: CPT

## 2020-07-15 PROCEDURE — 82107 ALPHA-FETOPROTEIN L3: CPT

## 2020-07-15 PROCEDURE — 36415 COLL VENOUS BLD VENIPUNCTURE: CPT

## 2020-07-15 PROCEDURE — 80076 HEPATIC FUNCTION PANEL: CPT

## 2020-07-15 RX ORDER — ALOGLIPTIN 25 MG/1
25 TABLET, FILM COATED ORAL DAILY
COMMUNITY
Start: 2020-06-19

## 2020-07-15 NOTE — PROGRESS NOTES
[address] [facility], MD, MD Erica Napier, PA-DINA Villasenor, [facility]-BC     Azalia Sierra, [facility] Hospital[facility]BC   Meir Vieralynn, FNP-C    Griffin Serratorid, [facility]       Mark Suhas [facility] [address]    at [address]    [address] [place] [address], [facility] Sher Cabrera  22.    [phone]    FAX: [address] [place] [address] Avenue    [address], [address]    [phone]    FAX: [phone]       Patient Care Team:  Ludmila John MD as PCP - General (Family Practice)  Jian Arenas MD (Gastroenterology)  Morenita Gleason MD (Radiology)  Kelly Lai MD (Internal Medicine)  Eugenie Fothergill, MD (Gastroenterology)        Problem List  Date Reviewed: 3/2/2020          Codes Class Noted    Type 2 diabetes mellitus with nephropathy [facility]) ICD-10-CM: E11.21  ICD-9-CM: 250.40, 583.81  1/30/2018        H/O alcohol abuse ICD-10-CM: F10.11  ICD-9-CM: 305.03  Unknown    Overview Signed 4/27/2016  6:37 PM by Sahil Tavarez     quit few months ago, particpates in [place] [address]             H/O tobacco use, presenting hazards to health ICD-10-CM: Z87.891  ICD-9-CM: V15.82  Unknown    Overview Signed 4/27/2016  6:37 PM by Sahil Tavarez     quit few months ago             CAD (coronary artery disease), native coronary artery ICD-10-CM: I25.10  ICD-9-CM: 414.01  Unknown    Overview Addendum 1/3/2014  3:55 PM by Sahil Polanco     p/mLAD 60% ((FFR 0.84). Otherwise mild coronary artery disease (march 2013).  LV EF 65% (echo feb 2013)             Hepatic encephalopathy (HCC) ICD-10-CM: K72.90  ICD-9-CM: 572.2  2/26/2013        CKD (chronic kidney disease) ICD-10-CM: N18.9  ICD-9-CM: 585.9  2/26/2013        Esophageal varices (HCC) ICD-10-CM: I85.00  ICD-9-CM: 456.1 2/26/2013        Cirrhosis ([facility] ICD-10-CM: K74.60  ICD-9-CM: 571.5  2/2/2013    Overview Signed 2/2/2013  5:22 PM by Mahad Cotton MD     Secondary to chronic HCV             Ascites ICD-10-CM: R18.8  ICD-9-CM: 789.59  2/2/2013        Hypertension ICD-10-CM: I10  ICD-9-CM: 401.9  5/23/2011        Ventral hernia, unspecified, without mention of obstruction or gangrene ICD-10-CM: K43.9  ICD-9-CM: 553.20  5/23/2011        Hyperlipidemia ICD-10-CM: E78.5  ICD-9-CM: 272.4  5/23/2011        Type II or unspecified type diabetes mellitus without mention of complication, not stated as uncontrolled ICD-10-CM: E11.9  ICD-9-CM: 250.00  5/23/2011        GERD (gastroesophageal reflux disease) ICD-10-CM: K21.9  ICD-9-CM: 530.81  5/23/2011        St's esophagus ICD-10-CM: K22.70  ICD-9-CM: 530.85  5/23/2011        Chronic hepatitis C ([facility] ICD-10-CM: B18.2  ICD-9-CM: 070.54  5/23/2011    Overview Signed 2/2/2013  5:24 PM by Mahad Cotton MD     Genotype 1  Peginterferon/ribavirin non-response as part of HALT-C clinical trial.    Peginterferon maintenance therapy during HALT-C clinical trial.   Conatus non-responder study. Discontinued secondary to joint pain. Peginterferon/Riba/Boceprevir. Treated for 12 weeks with significant anemia and thrombocytopenia. Non-responder. Thrombocytopenia, secondary to cirrhosis ICD-10-CM: D69.59  ICD-9-CM: 287.49  5/23/2011              Ivania Mora returns to the [place] [address] for monitoring of [facility]. 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 The HCV has been treated and cured. The active problem list, all pertinent past medical history, medications, liver histology, endoscopic studies, radiologic findings and laboratory findings related to the liver disorder were reviewed with the patient. Ascites resolved. The patient has not developed hepatic encephalopathy. The patient has esophageal varices without bleeding.       The patient has began, but has not completed liver transplant evaluation testing. The Thallium stress test demonstrated a fixed and 2 small reversible lesions. Cardiac catheterization did not indicate the need for stenting. Mr. Devi Gutierrez was noted to have a nodule on his thyroid while being worked up for a pleural effusion. He had a biopsy that was negative for malignancy. The patient had a LI-RADS 4 lesions from abdominal MRI performed in 11/2017. This was worked up and biopsied. Biopsy demonstrated that this lesion is a hemangioma. We have discussed the liver transplant process and various centers he could go to for liver transplantation. He would like to be seen at [place], [place] if this becomes neccessary. Mr. Devi Gutierrez completed 12 weeks of treatment with sofosbuvir and simeprevir in June 2014. He had a SVR two years post treatment. Mr. Blanche Sanchez denies any further alcohol use since January 2016. He states that he continues to participate in [place]. Dr. Joelle Burrows has performed HALO procedures for St esophagus. Dr. Joelle Burrows continue to follow the patient. His last EGD was 04/2019. No varices were seen. Scarring from previous band ligation was noted. Mr. Devi Gutierrez is overall feeling well since his last office visit. His lower extremity edema has completely resolved and he is currently not taking any diuretics. He deneis any shortness of breath. He continues to struggle with arthralgias due to osteoarthritis. He does have difficulty completing daily activities due to arthritis. The patient has not experienced problems concentrating, swelling of the abdomen, swelling of the lower extremities, hematemesis, hematochezia. Since the last office appointment the patient has:  Had no changes in the liver disease. Continues in [place]. Had dynamic MRI of the liver completed in 12/2019. Reports he has lost \"about 40 pounds since February, 2020\". ALLERGIES:  No Known Allergies    Current Outpatient Medications   Medication Sig    lisinopril (PRINIVIL, ZESTRIL) 20 mg tablet Take 20 mg by mouth every evening.  nitroglycerin (NITROSTAT) 0.4 mg SL tablet 1 Tab by SubLINGual route every five (5) minutes as needed for Chest Pain. Up to 3 doses.  anastrozole (ARIMIDEX) 1 mg tablet Take 1 mg by mouth every seven (7) days.  baclofen (LIORESAL) 10 mg tablet Take 10 mg by mouth.  testosterone 30 mg/actuation (1.5 mL) slpm by TransDERmal route.  terazosin (HYTRIN) 5 mg capsule Take 5 mg by mouth daily.  atorvastatin (LIPITOR) 10 mg tablet Take 20 mg by mouth daily.  metoprolol succinate (TOPROL-XL) 50 mg XL tablet Take 50 mg by mouth daily. Indications: HYPERTENSION    glipiZIDE SR (GLUCOTROL) 10 mg CR tablet Take 10 mg by mouth two (2) times a day. Indications: type 2 diabetes mellitus    saxagliptin (ONGLYZA) 5 mg Tab tablet Take 5 mg by mouth daily.  OMEPRAZOLE (PRILOSEC PO) Take 20 mg by mouth daily. Indications: GERD     No current facility-administered medications for this visit. SYSTEM REVIEW NOT RELATED TO LIVER DISEASE OR REVIEWED ABOVE:  Constitution systems: Negative for fever, chills, weight gain, weight loss. Eyes: Negative for visual changes. ENT: Negative for sore throat, painful swallowing. Respiratory: Negative for cough, hemoptysis, SOB. Cardiology: Negative for chest pain, palpitations. GI:  Negative for constipation or diarrhea. : Negative for urinary frequency, dysuria, hematuria, nocturia. Skin: Negative for rash. Hematology: Positive for easy bruising. Negative for blood clots. Musculo-skelatal: Negative for back pain, muscle pain, weakness. Neurologic: Negative for headaches, dizziness, vertigo, memory problems not related to HE. Psychology: Negative for anxiety, depression. FAMILY/SOCIAL HISTORY:  The patient is . The patient has 2 children. Stoped smoking 1/2016.   History of heavy alcohol use. Last alcohol consumption was 1/2016. The patient retired in 2010. PHYSICAL EXAMINATION:  Visit Vitals  /90   Pulse (!) 50   Temp (!) 95 °F (35 °C) (Temporal)   Ht 5' 10\" (1.778 m)   Wt 192 lb 4 oz (87.2 kg)   SpO2 99%   BMI 27.59 kg/m²       PHYSICAL EXAMINATION:  VS: per nursing note  General: No acute distress. Eyes: Sclera anicteric. ENT: No oral lesions. Thyroid normal.  Nodes: No adenopathy. Skin: No spider angiomata. No jaundice. No palmar erythema. Respiratory: Lungs clear to auscultation. Cardiovascular: Regular heart rate. No murmurs. No JVD. Abdomen: Soft non-tender, liver size normal to percussion/palpation. Spleen not palpable. No obvious ascites. Extremities: No edema. No muscle wasting. No gross arthritic changes. Neurologic: Alert and oriented. Cranial nerves grossly intact. No asterixis. LABORATORY:  Liver [place] [facility] [address] Units 7/15/2020 1/15/2020   WBC 4.6 - 13.2 K/uL 2.6 (L) 2.7 (L)   ANC 1.8 - 8.0 K/UL 1.9 1.8   HGB 13.0 - 16.0 g/dL 12.0 (L) 12.0 (L)    - 420 K/uL 58 (L) 51 (L)   INR 0.8 - 1.2   1.2 1.2   AST 10 - 38 U/L 36 22   ALT 16 - 61 U/L 37 29   Alk Phos 45 - 117 U/L 109 155 (H)   Bili, Total 0.2 - 1.0 MG/DL 0.7 0.5   Bili, Direct 0.0 - 0.2 MG/DL 0.2 0.2   Albumin 3.4 - 5.0 g/dL 4.1 3.7   BUN 7.0 - 18 MG/DL 38 (H) 27 (H)   Creat 0.6 - 1.3 MG/DL 1.43 (H) 1.55 (H)   Creat (iSTAT) 0.6 - 1.3 MG/DL     Na 136 - 145 mmol/L 141 141   K 3.5 - 5.5 mmol/L 5.1 4.9   Cl 100 - 111 mmol/L 109 109   CO2 21 - 32 mmol/L 28 31   Glucose 74 - 99 mg/dL 86 146 (H)     Cancer Screening Latest Ref Rng & Units 1/15/2020   AFP, Serum 0.0 - 8.0 ng/mL 4.4   AFP-L3% 0.0 - 9.9 % Comment     SEROLOGY  2/2012. Anti-HCV negative, CMV IgG positive, EBV IgG positive,     LIVER HISTOLOGY  12/2017. Liver mass biopsy. Hepatocytes show some mild steatosis.  The lesion in this case is a hemangioma, and there are several fragments of it along with the liver tissue, characterized by a fibrous background and scattered simple, capillary-like vessels. The architecture is cavernous. There is no evidence of malignancy. 12/2018. TRANSIENT HEPATIC ELASTOGRAPHY:   E Range: 8.9-13.9 kPa  E Mean: 11.5 kPa  E Median: 11.6 kPa  E Std: 1.7 kPa    ENDOSCOPIC PROCEDURES  11/2009:  EGD. Reported to demonstrate varices and Sts esophagus. 12/2010:  EGD. Grade I-II esophageal varices. 12/2010:  Colonoscopy - unremarkable. 3/2013. EGD by Dr Madhuri Alonso. Report requested. 05/27/2014:  EGD per MLS. Multiple medium esophageal varices. Six bands placed. Barrets esophagus (bx negative for malignancy). Repeat in 6 weeks. 09/2014: EGD per MLS. Small esophageal varices. Mild portal hypertensive gastropathy. No gastric varices. Polyp biopsied in the duodenal bulb found to be carcinoid by pathology. Large segment of Barretts esophagus. Biopsy technologically difficult due to bleeding. 02/2015:  EGD per Dr. Apryl Ariza. Esophageal varices. Seven bands placed. Portal hypertensive gastropathy. St esophagus. 3/2015: EGD per Dr. Apryl Ariza. St esophagus. Portal hypertensive gastropathy. Varices. 4/2015: EGD per Dr. Apryl Ariza. Esophageal varices. Seven bands placed. Portal hypertensive gastropathy. St esophagus. Will continue EGDs every four months. Can not perform HALO procedure until varices completley obliterated. 9/2015:  EGD per Dr. Apryl Ariza. Grade 1 varices. Four bands applied. Repeat in 6 months. If varices obliterated will perform Halo. 9/2016:  EGD per Dr. China Burnett. Small varices in mid esophagus. Distal esophagus with several columns of St's as previously noted. Banding of this mucosa was attempted but bands would not apply. The mucosa was friable and started to bleed actively. 1:10:000 epinephrine was injected (5 ml) at various bleeding sites. This did not control the bleeding.  Gold probe cautery was then applies to the bleeding sites and hemostasis was achieved. 11/2016:  EGD with HALO per Dr. Sakian Shrestha. [place] colored mucosa was seen at 34 cm and extended upto 38 cm. No nodules or ulceration was seen. Scarring from previous band ligation of varices was seen. No varices were seen. Small sliding hiatal hernia was seen. Stomach: Streaky erythema was seen in antrum and was radiating towards the pylorus. No gastric varices were seen Duodenum: normal  2/2017:  EGD with HALO per Dr. Sakina Shrestha. Repeat in 3 months. 05/2017. EGD by Dr. Sofya Aranda. HALO for St's. Repeat in 3 months. 08/2017. EGD by Dr. Sofya Aranda. HALO for St's. Repeat in 3 months. 02/2018. EGD by Dr. Sofya Aranda. Esophagus: Few small 3-4 mm salmon colored islands were seen at the 38 cm. No nodularity was seen. Grade 1 varices were seen. Stomach: Streaky erythema was noted in the antrum radiating towards the pylorus, consistent with gastric antral vascular ectasia. Duodenum/jejunum:normal.  Variceal ablation (including the St's mucosa) was performed with 2 of bands placed. Repeat in one year. 04/2019. EGD by Dr. Sofya Aranda. Irregular Z line was noted. No nodularity was seen. No [place] colored islands were noted. No varices were seen. Scarring from previous band ligation was noted. Repeat in one year. RADIOLOGY  1/2009:  Ultrasound liver - echogenic consistent with cirrhosis, no liver mass lesions, no ascites. 11/2010:  Abdominal ultrasound - no focal liver lesions noted, no ascites. 03/2012:  Ultrasound of the liver. Echogenic consistent with chronic liver disease, cirrhosis. No liver mass lesions. No ascites. 09/2012: Ultrasound of the liver. Echogenic consistent with chronic liver disease, cirrhosis. No liver mass lesions. No ascites. 2/12:  Chest x-ray PA and Lateral:  Normal heart size without consolidation. Band of atelectasis or scarring noted in the retrosternal region.   There is blunting on the right CP angle due to small effusion. 2/2013:  Ultrasound of liver. Echogenic consistent with cirrhosis. 2.5 x 1.7 lesion in left lobe. Appears larger than on previous US. No dilated bile ducts. No ascites. 2/2013. MRI abdomen. No contrast given. Changes consistent with cirrhosis. No liver mass lesions. No dilated bile ducts. No bile duct strictures. No ascites. 03/2013 - Triple phase CT of abdomen. Hypervascular mass in left lobe stable and consistent with hemangioma. Stable non enhancing mass in segment 4/5. Large right pleural effusion. 08/2013: Ultrasound of the liver. Echogenic consistent with cirrhosis. No ascites. No bile duct strictures. 4.5 x 1.9 x 2.1 hyperechoic circumscribed structure similar to to CT which indicated hemangioma. 02/2014: The previously documented hemangioma within the subcapsular left hepatic lobe  is not identified on this exam. Hyperechoic mass within segment V/IV is identified, shows some interval increase since comparison ultrasound (maximum diameter currently 3.2 cm, previously 2.5 cm) but prior MRI suggests this represents a regenerative nodule. Second hyperechoic masslike area is now seen at subcapsular anterior right hepatic lobe measuring 3 cm maximally. This lesion is not clearly identified on prior ultrasound or MRI. Regenerative nodule is certainly possible but the finding is nonspecific.  04/2014:  Abdominal MRI. 2 cm lesion in the right hepatic lobe demonstrating faint arterial enhancement and early washout, concerning for hepatocellular carcinoma. 10/2014: Dynamic CT of the abdomen. Cirrhosis of the liver and evidence for portal hypertension with hepatomegaly and varices. Probable hemangioma although now with slightly atypical appearance in the left lobe of the liver lateral segment is not significantly changed in the interval in size as far back as 2011. This is an atypical appearance and behavior for hepatocellular carcinoma.  3. Probable focal fibrosis or regenerative nodule in the interlobar fissure. 3/2015: Ultrasound of the liver. A relatively discrete nodule is present in the right lobe measuring 3.2 x 2.2 x 2.2 cm. This is visible in retrospect on the prior study of 2014 February and is not hypervascular or well-defined on the following CT and MR studies. 11/2015: Abdominal ultrasound: Nodule in right hepatic lobe unchanged from previous imaging. No new lesions. No ascites. 01/2016: Ultrasound of liver. Echogenic consistent with cirrhosis. No liver mass lesions. No dilated bile ducts. No ascites. 9/2016: Ultrasound of the liver. Heterogeneous echotexture and lobulated contour of the liver, consistent with reported cirrhosis. Echogenic right hepatic mass is grossly stable given slight differences in technique/positioning. No new hepatic mass was seen. Additional previously seen left hepatic mass is not visualized on today's exam.  04/2017. Ultrasound of the liver. Redemonstrated heterogeneous echotexture and lobulated contour of the liver, compatible with history of cirrhosis. Stable echogenic mass in the right hepatic lobe. No new mass identified. Previously seen mass in the left hepatic lobe on prior CT scan remains inconspicuous by ultrasound. 11/2017. Abdominal MRI w/wo contrast.  Hepatic cirrhosis. Li-RADS 4 lesion in hepatic segment 3 (probable hepatocellular carcinoma). Faint arterial phase hyperenhancement throughout the left hepatic lobe. While infiltrative lesion not absolutely excluded, suspect finding reflects intralesional shunting related to the lesion. Scattered additional Li-RADS 3 hepatic lesions (indeterminate). 12/2017. Abdominal CT w/wo contrast. Complex hypervascular lesion in the periphery of segment 3 of the left hepatic lobe as described and measured above. Minimal interval increase in size. LI-RADS 4. Developing hepatoma is most likely. 06/2018. Ultrasound of the liver. Cirrhosis.   Ovoid hyperechoic nodule in the right lobe, unchanged in the interval. Prior biopsy confirmed this as a hemangioma. 12/2018. Ultrasound of the liver. Coarsely heterogeneous echotexture with a nodular contour and prominent caudate lobe, in keeping with history of cirrhosis. Redemonstrated hyperechoic mass measuring 1.4 x 1.3 x 1.0 cm and the left hepatic lobe, in keeping with a hemangioma demonstrated on prior studies which was previously biopsied. 07/2019. Dynamic MRI of the liver. Slowly growing 2.7 cm mass without arterial enhancement, with washout in hepatic segment IVb. This mass measured up to 2.1 cm thousand 17 and 1.5 cm 2014. LIRADS 4. Fibrotic changes within the right liver have progressed since 2017. Hemangioma and associated shunting within the left lateral liver are unchanged since 2017.  12/2019. Dynamic MRI of the liver. Cirrhosis with no significant change in size or appearance of previously described T2 hypointense nodule in the right hepatic lobe. Given stability as well as current imaging features, this most likely represents a nodular area of liver parenchyma which is accentuated by the adjacent background of confluent fibrosis rather than a true mass. LR-2. Left hepatic lobe hemangioma and adjacent heterogeneous enhancement again identified, unchanged. LR-1. No new suspicious nodules or masses identified in the liver. 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 The most recent laboratory studies indicate that the liver transaminases are normal, alkaline phosphatase is elevated, tests of hepatic synthetic and metabolic function are normal, and the platelet count is depressed. Will perform laboratory testing to monitor liver function and degree of liver injury. This will include hepatic panel, a CBC w/ diff, a BMP, a PT/INR, and an AFP-L3%. Complications of cirrhosis were discussed in detail.  We discussed thrombocytopenia, portal hypertension, varices, GI bleeding, peripheral edema, ascites, hepatic encephalopathy, and hepatocellular carcinoma. We discussed the need for follow ups on a regular basis to monitor for complications. We discussed the need for every 6 month liver imaging studies. Chronic HCV, genotype 1. He completed twelve weeks of treatment with simeprevir and sofosbuvir in June 2014. He had a SVR 2 years post treatment. Peripheral edema. Resolved. Esophageal varicies without prior bleeding. He has had repeat EGDs with HALO procedure performed. No varices identified. He returns to Dr. Mary Friend annually. Hepatic encephalopathy has not developed to date. There is no need for treatment with lactulose and/or xifaxan at this time. ETOH abuse. He has not consumed any alcohol since 01/2016. Continue AA. Received his 4 year sobriety medallion. The patient had a postive thalium stress test with reversible ischemia. He has a cardiac catheterization. No significant ASCAD identified. Thrombocytopenia secondary to cirrhosis. There is no evidence of overt bleeding. There is no indication for platelet transfusions or pharmacologic treatment to increase the platelet count. [address] screening. Recent imaging suggested that Mr. Jessie Long had developed [address] He was referred to Dr. Isaac Ruth for evaluation. The LI-RADS 4 lesion was biopsied and determined to be a hemangioma. The plan was to continue with advanced imaging, either MRI or CT, every 6 months moving forward. Lesion now classified as LI-RADS 2. We will therefore perform ultrasound alternating with advanced imaging at 6 month intervals. These, along with AFP's every 6 months, should suffice for [address] screenings. Ultrasound was ordered today to be performed in 06/2020. All of the above issues were discussed with the patient. All questions were answered. The patient expressed a clear understanding of the above.     25 minutes total time spent with this patient with more than 50% of this time spent counseling and coordinating care as described above. [address] in 6 months.       EUGENIE CowanP-C  Liver [place] [address], [address] [place] [address], [address] [place] [address]   [phone]

## 2020-07-16 LAB
AFP L3 MFR SERPL: NORMAL % (ref 0–9.9)
AFP SERPL-MCNC: 3.4 NG/ML (ref 0–8)

## 2020-07-29 ENCOUNTER — HOSPITAL ENCOUNTER (OUTPATIENT)
Dept: ULTRASOUND IMAGING | Age: 65
Discharge: HOME | End: 2020-07-29
Payer: MEDICARE

## 2020-07-29 DIAGNOSIS — K74.60 CIRRHOSIS OF LIVER WITHOUT ASCITES, UNSPECIFIED HEPATIC CIRRHOSIS TYPE (HCC): ICD-10-CM

## 2020-07-29 PROCEDURE — 76981 USE PARENCHYMA: CPT

## 2020-07-30 ENCOUNTER — OFFICE VISIT (OUTPATIENT)
Dept: CARDIOLOGY CLINIC | Age: 65
End: 2020-07-30

## 2020-07-30 VITALS
BODY MASS INDEX: 27.35 KG/M2 | HEART RATE: 58 BPM | HEIGHT: 70 IN | WEIGHT: 191 LBS | OXYGEN SATURATION: 98 % | SYSTOLIC BLOOD PRESSURE: 118 MMHG | DIASTOLIC BLOOD PRESSURE: 76 MMHG

## 2020-07-30 DIAGNOSIS — D69.6 THROMBOCYTOPENIA (HCC): ICD-10-CM

## 2020-07-30 DIAGNOSIS — I10 ESSENTIAL HYPERTENSION: ICD-10-CM

## 2020-07-30 DIAGNOSIS — K74.60 CIRRHOSIS OF LIVER WITHOUT ASCITES, UNSPECIFIED HEPATIC CIRRHOSIS TYPE (HCC): ICD-10-CM

## 2020-07-30 DIAGNOSIS — R07.9 CHEST PAIN, UNSPECIFIED TYPE: ICD-10-CM

## 2020-07-30 DIAGNOSIS — N18.9 CHRONIC KIDNEY DISEASE, UNSPECIFIED CKD STAGE: ICD-10-CM

## 2020-07-30 DIAGNOSIS — E11.9 CONTROLLED TYPE 2 DIABETES MELLITUS WITHOUT COMPLICATION, WITHOUT LONG-TERM CURRENT USE OF INSULIN (HCC): ICD-10-CM

## 2020-07-30 DIAGNOSIS — I25.10 CORONARY ARTERY DISEASE INVOLVING NATIVE HEART WITHOUT ANGINA PECTORIS, UNSPECIFIED VESSEL OR LESION TYPE: Primary | ICD-10-CM

## 2020-07-30 NOTE — PROGRESS NOTES
Luisito Perry presents today for   Chief Complaint   Patient presents with    Follow-up     6 month follow up       Luisito Perry preferred language for health care discussion is english/other. Is someone accompanying this pt? no    Is the patient using any DME equipment during [address]? no    Depression Screening:  3 most recent PHQ Screens 7/30/2020   Little interest or pleasure in doing things Not at all   Feeling down, depressed, irritable, or hopeless Not at all   Total Score PHQ 2 0       Learning Assessment:  Learning Assessment 12/4/2017   PRIMARY LEARNER Patient   HIGHEST LEVEL OF EDUCATION - PRIMARY LEARNER  -   BARRIERS PRIMARY LEARNER NONE   CO-LEARNER CAREGIVER No   PRIMARY LANGUAGE ENGLISH   LEARNER PREFERENCE PRIMARY LISTENING     -     -   LEARNING SPECIAL TOPICS -   ANSWERED BY patient   RELATIONSHIP SELF       Abuse Screening:  Abuse Screening Questionnaire 7/30/2020   Do you ever feel afraid of your partner? N   Are you in a relationship with someone who physically or mentally threatens you? N   Is it safe for you to go home? Y       Fall Risk  Fall Risk Assessment, last 12 mths 7/30/2020   Able to walk? Yes   Fall in past 12 months? No       Pt currently taking Anticoagulant therapy? no    Coordination of Care:  1. Have you been to the ER, urgent care clinic since your last visit? Hospitalized since your last visit? no    2. Have you seen or consulted any other health care providers outside of the [address] [place] [address] since your last visit? Include any pap smears or colon screening.  no

## 2020-07-30 NOTE — PROGRESS NOTES
HPI: A 69-year-old male here for follow up. When I established care with him in January, he had been having some occasional chest pain. He had a known 60% LAD lesion from 2014 heart catheterization. I performed a stress test and there was no obvious ischemia. He has lost about 30 pounds. He has not had any recurrent chest pain. He is following up with his primary care physician. He recently had blood work and was stable. He has no new dyspnea, syncope, PND, orthopnea or edema. He golfs regularly. Impression/Plan:  1. Recent increase in chest pain. However, pharmacologic cardiac nuclear stress test March 2020 without obvious ischemia. 2. Known coronary artery disease with 60% LAD lesion with FFR 0.84 by heart catheterization 2014, medically treated at this time. He is high risk for catheterization due to low platelets and kidney disease. 3. History of hypertension, now stable. He did have some mild hypotension after his weight loss which is improved. 4. Chronic Stage III kidney disease with last creatinine between 1.4-1.5 July 2020.   5. History of previous alcoholic cirrhosis and liver transplant workup. However, since he stopped alcohol, his cirrhosis has stabilized. 6. Remote St's esophagus with last EGD April 2019.   7. Dyslipidemia and despite cirrhosis able to tolerate statin. 8. Degenerative joint disease. 9. Chronic thrombocytopenia 50-55,000. 10. History of diabetes with recent decrease in medications. His last hemoglobin A1c was in the 5s. He is very stable from a cardiac standpoint with known coronary artery disease. He is clearly at high risk for heart catheterization and likely his chest pain has improved. We will continue to monitor annually at this time. All questions answered. Past Medical History:   Diagnosis Date    St's esophagus 5/23/2011    CAD (coronary artery disease), native coronary artery     p/mLAD 60% ((FFR 0.84). Otherwise mild coronary artery disease.  Chronic hepatitis C without mention of hepatic coma, genotype 1 5/23/2011    Chronic pain     back    Cirrhosis (HCC)     GERD (gastroesophageal reflux disease) 5/23/2011    H/O alcohol abuse     quit few months ago, particpates in [facility]    H/O tobacco use, presenting hazards to health     quit few months ago    History of echocardiogram 02/19/2013    EF 65%. No reg'l WMA. RVSP 25 mmHg.  History of myocardial perfusion scan 02/19/2013    Fixed inferior defect, likely artifact. Sm, mild reversible apical defect concerning for mild ischemia; could be apical thinning. Sm reversible distal inferolateral apical wall. Sm distal inferolateral, apical, & inferoapical defect possibly ischemia. EF 74%. No WMA. Neg EKG on pharm stress test.    Hyperlipidemia 5/23/2011    Hypertension 20yrs+- 1990's    Liver disease     chronic hep c   -non detectable since 2013    OA (osteoarthritis)     Other ill-defined conditions(799.89)     triglycerides    S/P cardiac cath 03/28/2013    p/mLAD 60% ((FFR 0.84). Otherwise mild coronary artery disease. RA 9.  RV 48/14. PA 50/20. W 25. COCI:  6.5/3.2 (TD) and 6.8/3.2 (Ingrid).  Thrombocytopenia, secondary to cirrhosis 5/23/2011    Type II or unspecified type diabetes mellitus without mention of complication, not stated as uncontrolled 2007    Ventral hernia, unspecified, without mention of obstruction or gangrene 5/23/2011       Current Outpatient Medications   Medication Sig Dispense Refill    alogliptin (NESINA) 25 mg tablet Take 25 mg by mouth daily.  lisinopril (PRINIVIL, ZESTRIL) 20 mg tablet Take 10 mg by mouth every evening.  nitroglycerin (NITROSTAT) 0.4 mg SL tablet 1 Tab by SubLINGual route every five (5) minutes as needed for Chest Pain. Up to 3 doses. 25 Tab 3    anastrozole (ARIMIDEX) 1 mg tablet Take 1 mg by mouth every seven (7) days.  baclofen (LIORESAL) 10 mg tablet Take 10 mg by mouth.       testosterone 30 mg/actuation (1.5 mL) slpm by TransDERmal route.  terazosin (HYTRIN) 5 mg capsule Take 5 mg by mouth daily.  atorvastatin (LIPITOR) 10 mg tablet Take 20 mg by mouth daily.  metoprolol succinate (TOPROL-XL) 50 mg XL tablet Take 50 mg by mouth daily. Indications: HYPERTENSION  0    glipiZIDE SR (GLUCOTROL) 10 mg CR tablet Take 10 mg by mouth daily. Indications: type 2 diabetes mellitus      OMEPRAZOLE (PRILOSEC PO) Take 20 mg by mouth daily. Indications: GERD         Social History   reports that he quit smoking about 4 years ago. He has a 12.50 pack-year smoking history. He has never used smokeless tobacco.   reports no history of alcohol use. Family History  family history includes Cancer in his father; Diabetes in his mother and sister; HIV/AIDS in his brother; Heart Failure in his mother and sister; Hypertension in his mother; Other in his mother. Review of Systems  Except as stated above include:  Constitutional: Negative for fever, chills and malaise/fatigue. HEENT: No congestion or recent URI. Gastrointestinal: No nausea, vomiting, abdominal pain, bloody stools. Pulmonary:  Negative except as stated above. Cardiac:  Negative except as stated above. Musculoskeletal: Negative except as stated above. Neurological:  No localized symptoms. Skin:  Negative except as stated above. Psych:  Negative except as stated above. Endocrine:  Negative except as stated above. PHYSICAL EXAM  BP Readings from Last 3 Encounters:   07/30/20 118/76   07/15/20 183/90   03/02/20 140/78     Pulse Readings from Last 3 Encounters:   07/30/20 (!) 58   07/15/20 (!) 50   03/02/20 (!) 50     Wt Readings from Last 3 Encounters:   07/30/20 86.6 kg (191 lb)   07/15/20 87.2 kg (192 lb 4 oz)   03/02/20 97.1 kg (214 lb)     General:   Well developed, well groomed. Head/Neck:   No jugular venous distention     No carotid bruits. No evidence of xanthelasma. Lungs:   No respiratory distress.       Clear bilaterally. Heart:    Regular rate and rhythm. Normal S1/S2. Palpation of heart with normal point of maximum impulse. No significant murmurs, rubs or gallops. Abdomen:   Soft and nontender. No palpable abdominal mass or bruits. Extremities:   Intact peripheral pulses. No significant edema. Neurological:   Alert and oriented to person, place, time. No focal neurological deficit visually. Skin:   No obvious rash    Blood Pressure Metric:  Monitor recommended and adjustments stated if needed.

## 2020-08-03 ENCOUNTER — TELEPHONE (OUTPATIENT)
Dept: HEMATOLOGY | Age: 65
End: 2020-08-03

## 2021-01-19 ENCOUNTER — OFFICE VISIT (OUTPATIENT)
Dept: HEMATOLOGY | Age: 66
End: 2021-01-19
Payer: MEDICARE

## 2021-01-19 ENCOUNTER — HOSPITAL ENCOUNTER (OUTPATIENT)
Dept: LAB | Age: 66
Discharge: HOME | End: 2021-01-19
Payer: MEDICARE

## 2021-01-19 VITALS
SYSTOLIC BLOOD PRESSURE: 184 MMHG | WEIGHT: 195 LBS | BODY MASS INDEX: 27.92 KG/M2 | TEMPERATURE: 96 F | HEART RATE: 50 BPM | DIASTOLIC BLOOD PRESSURE: 94 MMHG | RESPIRATION RATE: 16 BRPM | HEIGHT: 70 IN | OXYGEN SATURATION: 99 %

## 2021-01-19 DIAGNOSIS — K74.60 CIRRHOSIS OF LIVER WITHOUT ASCITES, UNSPECIFIED HEPATIC CIRRHOSIS TYPE (HCC): ICD-10-CM

## 2021-01-19 DIAGNOSIS — K74.60 CIRRHOSIS OF LIVER WITHOUT ASCITES, UNSPECIFIED HEPATIC CIRRHOSIS TYPE (HCC): Primary | ICD-10-CM

## 2021-01-19 PROBLEM — Z86.19 HEPATITIS C VIRUS INFECTION CURED AFTER ANTIVIRAL DRUG THERAPY: Status: ACTIVE | Noted: 2021-01-19

## 2021-01-19 LAB
ALBUMIN SERPL-MCNC: 4.2 G/DL (ref 3.4–5)
ALBUMIN/GLOB SERPL: 1.3 {RATIO} (ref 0.8–1.7)
ALP SERPL-CCNC: 108 U/L (ref 45–117)
ALT SERPL-CCNC: 28 U/L (ref 16–61)
ANION GAP SERPL CALC-SCNC: 1 MMOL/L (ref 3–18)
AST SERPL-CCNC: 21 U/L (ref 10–38)
BASOPHILS # BLD: 0 K/UL (ref 0–0.1)
BASOPHILS NFR BLD: 0 % (ref 0–2)
BILIRUB DIRECT SERPL-MCNC: 0.2 MG/DL (ref 0–0.2)
BILIRUB SERPL-MCNC: 0.7 MG/DL (ref 0.2–1)
BUN SERPL-MCNC: 36 MG/DL (ref 7–18)
BUN/CREAT SERPL: 26 (ref 12–20)
CALCIUM SERPL-MCNC: 9.7 MG/DL (ref 8.5–10.1)
CHLORIDE SERPL-SCNC: 110 MMOL/L (ref 100–111)
CO2 SERPL-SCNC: 28 MMOL/L (ref 21–32)
CREAT SERPL-MCNC: 1.38 MG/DL (ref 0.6–1.3)
DIFFERENTIAL METHOD BLD: ABNORMAL
EOSINOPHIL # BLD: 0.2 K/UL (ref 0–0.4)
EOSINOPHIL NFR BLD: 6 % (ref 0–5)
ERYTHROCYTE [DISTWIDTH] IN BLOOD BY AUTOMATED COUNT: 14.6 % (ref 11.6–14.5)
GLOBULIN SER CALC-MCNC: 3.3 G/DL (ref 2–4)
GLUCOSE SERPL-MCNC: 67 MG/DL (ref 74–99)
HCT VFR BLD AUTO: 40.4 % (ref 36–48)
HGB BLD-MCNC: 13.1 G/DL (ref 13–16)
INR PPP: 1.2 (ref 0.8–1.2)
LYMPHOCYTES # BLD: 0.5 K/UL (ref 0.9–3.6)
LYMPHOCYTES NFR BLD: 17 % (ref 21–52)
MCH RBC QN AUTO: 28.5 PG (ref 24–34)
MCHC RBC AUTO-ENTMCNC: 32.4 G/DL (ref 31–37)
MCV RBC AUTO: 87.8 FL (ref 74–97)
MONOCYTES # BLD: 0.2 K/UL (ref 0.05–1.2)
MONOCYTES NFR BLD: 7 % (ref 3–10)
NEUTS SEG # BLD: 2.1 K/UL (ref 1.8–8)
NEUTS SEG NFR BLD: 70 % (ref 40–73)
PLATELET # BLD AUTO: 54 K/UL (ref 135–420)
PMV BLD AUTO: 12.9 FL (ref 9.2–11.8)
POTASSIUM SERPL-SCNC: 5.9 MMOL/L (ref 3.5–5.5)
PROT SERPL-MCNC: 7.5 G/DL (ref 6.4–8.2)
PROTHROMBIN TIME: 15.4 SEC (ref 11.5–15.2)
RBC # BLD AUTO: 4.6 M/UL (ref 4.7–5.5)
SODIUM SERPL-SCNC: 139 MMOL/L (ref 136–145)
WBC # BLD AUTO: 3 K/UL (ref 4.6–13.2)

## 2021-01-19 PROCEDURE — G8536 NO DOC ELDER MAL SCRN: HCPCS | Performed by: NURSE PRACTITIONER

## 2021-01-19 PROCEDURE — G8417 CALC BMI ABV UP PARAM F/U: HCPCS | Performed by: NURSE PRACTITIONER

## 2021-01-19 PROCEDURE — G8755 DIAS BP > OR = 90: HCPCS | Performed by: NURSE PRACTITIONER

## 2021-01-19 PROCEDURE — G0463 HOSPITAL OUTPT CLINIC VISIT: HCPCS | Performed by: NURSE PRACTITIONER

## 2021-01-19 PROCEDURE — 99214 OFFICE O/P EST MOD 30 MIN: CPT | Performed by: NURSE PRACTITIONER

## 2021-01-19 PROCEDURE — G9711 PT HX TOT COL OR COLON CA: HCPCS | Performed by: NURSE PRACTITIONER

## 2021-01-19 PROCEDURE — 85610 PROTHROMBIN TIME: CPT

## 2021-01-19 PROCEDURE — 80076 HEPATIC FUNCTION PANEL: CPT

## 2021-01-19 PROCEDURE — G8753 SYS BP > OR = 140: HCPCS | Performed by: NURSE PRACTITIONER

## 2021-01-19 PROCEDURE — 85025 COMPLETE CBC W/AUTO DIFF WBC: CPT

## 2021-01-19 PROCEDURE — 36415 COLL VENOUS BLD VENIPUNCTURE: CPT

## 2021-01-19 PROCEDURE — G8427 DOCREV CUR MEDS BY ELIG CLIN: HCPCS | Performed by: NURSE PRACTITIONER

## 2021-01-19 PROCEDURE — 82107 ALPHA-FETOPROTEIN L3: CPT

## 2021-01-19 PROCEDURE — G8432 DEP SCR NOT DOC, RNG: HCPCS | Performed by: NURSE PRACTITIONER

## 2021-01-19 PROCEDURE — 80048 BASIC METABOLIC PNL TOTAL CA: CPT

## 2021-01-19 PROCEDURE — 1101F PT FALLS ASSESS-DOCD LE1/YR: CPT | Performed by: NURSE PRACTITIONER

## 2021-01-19 NOTE — PROGRESS NOTES
[address] [place] [address], MD, Donneta Holter, MD Cayetano Holiday, DONITA Laurent, ACNP-BC     April S Rigo, [facility] Center[facility]BC   Mohamud Woodsley, FNP-DINA    Asad Yasminyomaira, [facility]       Mark Dai [facility] [address]    at [address], [address] [address]    [phone]    FAX: [address] [place] [address], [address], [address]    [phone]    FAX: [phone]       Patient Care Team:  Jaquan Mccormick MD as PCP - General (Family Medicine)  Stalin Vyas MD (Gastroenterology)  Lucy Rothman MD (Internal Medicine)  Angel Miller MD (Gastroenterology)        Problem List  Date Reviewed: 7/30/2020          Codes Class Noted    Type 2 diabetes mellitus with nephropathy [facility]) ICD-10-CM: E11.21  ICD-9-CM: 250.40, 583.81  1/30/2018        H/O alcohol abuse ICD-10-CM: F10.11  ICD-9-CM: 305.03  Unknown    Overview Signed 4/27/2016  6:37 PM by Sahil Glez     quit few months ago, particpates in [place] [address]             H/O tobacco use, presenting hazards to health ICD-10-CM: Z87.891  ICD-9-CM: V15.82  Unknown    Overview Signed 4/27/2016  6:37 PM by Sahil Glez     quit few months ago             CAD (coronary artery disease), native coronary artery ICD-10-CM: I25.10  ICD-9-CM: 414.01  Unknown    Overview Addendum 1/3/2014  3:55 PM by Sahil Polanco     p/mLAD 60% ((FFR 0.84). Otherwise mild coronary artery disease (march 2013).  LV EF 65% (echo feb 2013)             Hepatic encephalopathy (HCC) ICD-10-CM: K72.90  ICD-9-CM: 572.2  2/26/2013        CKD (chronic kidney disease) ICD-10-CM: N18.9  ICD-9-CM: 585.9  2/26/2013        Esophageal varices ([facility] ICD-10-CM: I85.00  ICD-9-CM: 456.1  2/26/2013        Cirrhosis ([facility] ICD-10-CM: K74.60  ICD-9-CM: 571.5  2/2/2013    Overview Signed 2/2/2013  5:22 PM by Precious Mejía MD     Secondary to chronic HCV             Ascites ICD-10-CM: R18.8  ICD-9-CM: 789.59  2/2/2013        Hypertension ICD-10-CM: I10  ICD-9-CM: 401.9  5/23/2011        Ventral hernia, unspecified, without mention of obstruction or gangrene ICD-10-CM: K43.9  ICD-9-CM: 553.20  5/23/2011        Hyperlipidemia ICD-10-CM: E78.5  ICD-9-CM: 272.4  5/23/2011        Type II or unspecified type diabetes mellitus without mention of complication, not stated as uncontrolled ICD-10-CM: E11.9  ICD-9-CM: 250.00  5/23/2011        GERD (gastroesophageal reflux disease) ICD-10-CM: K21.9  ICD-9-CM: 530.81  5/23/2011        St's esophagus ICD-10-CM: K22.70  ICD-9-CM: 530.85  5/23/2011        Chronic hepatitis C ([facility] ICD-10-CM: B18.2  ICD-9-CM: 070.54  5/23/2011    Overview Signed 2/2/2013  5:24 PM by Precious Mejía MD     Genotype 1  Peginterferon/ribavirin non-response as part of HALT-C clinical trial.    Peginterferon maintenance therapy during HALT-C clinical trial.   Conatus non-responder study. Discontinued secondary to joint pain. Peginterferon/Riba/Boceprevir. Treated for 12 weeks with significant anemia and thrombocytopenia. Non-responder. Thrombocytopenia, secondary to cirrhosis ICD-10-CM: D69.59  ICD-9-CM: 287.49  5/23/2011              Malvin May returns to the [place] [address] for [facility] 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 The HCV has been treated and cured. The active problem list, all pertinent past medical history, medications, liver histology, endoscopic studies, radiologic findings and laboratory findings related to the liver disorder were reviewed with the patient. Ascites resolved. The patient has not developed hepatic encephalopathy. The patient has esophageal varices without bleeding.       The patient has began, but has not completed liver transplant evaluation testing. The Thallium stress test demonstrated a fixed and 2 small reversible lesions. Cardiac catheterization did not indicate the need for stenting. Mr. Kay London was noted to have a nodule on his thyroid while being worked up for a pleural effusion. He had a biopsy that was negative for malignancy. The patient had a LI-RADS 4 lesions from abdominal MRI performed in 11/2017. This was worked up and biopsied. Biopsy demonstrated that this lesion is a hemangioma. We have discussed the liver transplant process and various centers he could go to for liver transplantation. He would like to be seen at [place], [place] if this becomes neccessary. Mr. Kay London completed 12 weeks of treatment with sofosbuvir and simeprevir in June 2014. He had a SVR two years post treatment. Mr. Antonio Flynn denies any further alcohol use since January 2016. He states that he continues to participate in [place]. Dr. Marcia Jean has performed HALO procedures for St esophagus. Dr. Marcia Jean continues to follow the patient. Mr. Kay London is overall feeling well since his last office visit. His lower extremity edema has completely resolved and he is currently not taking any diuretics. He deneis any shortness of breath. He continues to struggle with arthralgias due to osteoarthritis. He does have difficulty completing daily activities due to arthritis. The patient has not experienced problems concentrating, swelling of the abdomen, swelling of the lower extremities, hematemesis, hematochezia. Since the last office appointment the patient has:  Had no changes in the liver disease. Continues in [place]. Was on levaquin for 7 days for bronchitis. Had EGD and Colonoscopy by Dr. Charlee Roth in 12/2020. He reports that he has H. Pylori. He reports that this has not yet been treated. I cannot find the report, so I cannot offer him treatment. ALLERGIES:  No Known Allergies    Current Outpatient Medications   Medication Sig    alogliptin (NESINA) 25 mg tablet Take 25 mg by mouth daily.  lisinopril (PRINIVIL, ZESTRIL) 20 mg tablet Take 10 mg by mouth every evening.  nitroglycerin (NITROSTAT) 0.4 mg SL tablet 1 Tab by SubLINGual route every five (5) minutes as needed for Chest Pain. Up to 3 doses.  anastrozole (ARIMIDEX) 1 mg tablet Take 1 mg by mouth every seven (7) days.  baclofen (LIORESAL) 10 mg tablet Take 10 mg by mouth.  testosterone 30 mg/actuation (1.5 mL) slpm by TransDERmal route.  terazosin (HYTRIN) 5 mg capsule Take 5 mg by mouth daily.  atorvastatin (LIPITOR) 10 mg tablet Take 20 mg by mouth daily.  metoprolol succinate (TOPROL-XL) 50 mg XL tablet Take 50 mg by mouth daily. Indications: HYPERTENSION    glipiZIDE SR (GLUCOTROL) 10 mg CR tablet Take 10 mg by mouth daily. Indications: type 2 diabetes mellitus    OMEPRAZOLE (PRILOSEC PO) Take 20 mg by mouth daily. Indications: GERD     No current facility-administered medications for this visit. SYSTEM REVIEW NOT RELATED TO LIVER DISEASE OR REVIEWED ABOVE:  Constitution systems: Negative for fever, chills, weight gain, weight loss. Eyes: Negative for visual changes. ENT: Negative for sore throat, painful swallowing. Respiratory: Negative for cough, hemoptysis, SOB. Cardiology: Negative for chest pain, palpitations. GI:  Negative for constipation or diarrhea. : Negative for urinary frequency, dysuria, hematuria, nocturia. Skin: Negative for rash. Hematology: Positive for easy bruising. Negative for blood clots. Musculo-skelatal: Negative for back pain, muscle pain, weakness. Neurologic: Negative for headaches, dizziness, vertigo, memory problems not related to HE. Psychology: Negative for anxiety, depression. FAMILY/SOCIAL HISTORY:  The patient is . The patient has 2 children. Stoped smoking 1/2016.   History of heavy alcohol use.  Last alcohol consumption was 1/2016. The patient retired in 2010. PHYSICAL EXAMINATION:  Visit Vitals  BP (!) 184/94   Pulse (!) 50   Temp (!) 96 °F (35.6 °C)   Resp 16   Ht 5' 10\" (1.778 m)   Wt 195 lb (88.5 kg)   SpO2 99%   BMI 27.98 kg/m²       PHYSICAL EXAMINATION:  VS: per nursing note  General: No acute distress. Eyes: Sclera anicteric. ENT: No oral lesions. Thyroid normal.  Nodes: No adenopathy. Skin: No spider angiomata. No jaundice. No palmar erythema. Respiratory: Lungs clear to auscultation. Cardiovascular: Regular heart rate. No murmurs. No JVD. Abdomen: Soft non-tender, liver size normal to percussion/palpation. Spleen not palpable. No obvious ascites. Extremities: No edema. No muscle wasting. No gross arthritic changes. Neurologic: Alert and oriented. Cranial nerves grossly intact. No asterixis. LABORATORY:  [facility] [place] of [address] Units 1/19/2021 7/15/2020   WBC 4.6 - 13.2 K/uL 3.0 (L) 2.6 (L)   ANC 1.8 - 8.0 K/UL 2.1 1.9   HGB 13.0 - 16.0 g/dL 13.1 12.0 (L)    - 420 K/uL 54 (L) 58 (L)   INR 0.8 - 1.2   1.2 1.2   AST 10 - 38 U/L 21 36   ALT 16 - 61 U/L 28 37   Alk Phos 45 - 117 U/L 108 109   Bili, Total 0.2 - 1.0 MG/DL 0.7 0.7   Bili, Direct 0.0 - 0.2 MG/DL 0.2 0.2   Albumin 3.4 - 5.0 g/dL 4.2 4.1   BUN 7.0 - 18 MG/DL 36 (H) 38 (H)   Creat 0.6 - 1.3 MG/DL 1.38 (H) 1.43 (H)   Creat (iSTAT) 0.6 - 1.3 MG/DL     Na 136 - 145 mmol/L 139 141   K 3.5 - 5.5 mmol/L 5.9 (H) 5.1   Cl 100 - 111 mmol/L 110 109   CO2 21 - 32 mmol/L 28 28   Glucose 74 - 99 mg/dL 67 (L) 86     Cancer Screening Latest Ref Rng & Units 7/15/2020 1/15/2020   AFP, Serum 0.0 - 8.0 ng/mL 3.4 4.4   AFP-L3% 0.0 - 9.9 % Comment Comment     SEROLOGY  2/2012. Anti-HCV negative, CMV IgG positive, EBV IgG positive,     LIVER HISTOLOGY  12/2017. Liver mass biopsy. Hepatocytes show some mild steatosis.  The lesion in this case is a hemangioma, and there are several fragments of it along with the liver tissue, characterized by a fibrous background and scattered simple, capillary-like vessels. The architecture is cavernous. There is no evidence of malignancy. 12/2018. TRANSIENT HEPATIC ELASTOGRAPHY:   E Range: 8.9-13.9 kPa  E Mean: 11.5 kPa  E Median: 11.6 kPa  E Std: 1.7 kPa    07/2020. TRANSIENT HEPATIC ELASTOGRAPHY:   E Range: 6.8-14.9 kPa  E Mean: 10.0 kPa  E Median: 8.9 kPa  E Std: 2.8 kPa    ENDOSCOPIC PROCEDURES  2/2017:  EGD with HALO per Dr. Freida Pleitez. Repeat in 3 months. 05/2017. EGD by Dr. Hussain Diaz. HALO for St's. Repeat in 3 months. 08/2017. EGD by Dr. Hussain Diaz. HALO for St's. Repeat in 3 months. 02/2018. EGD by Dr. Hussain Diaz. Esophagus: Few small 3-4 mm salmon colored islands were seen at the 38 cm. No nodularity was seen. Grade 1 varices were seen. Stomach: Streaky erythema was noted in the antrum radiating towards the pylorus, consistent with gastric antral vascular ectasia. Duodenum/jejunum:normal.  Variceal ablation (including the St's mucosa) was performed with 2 of bands placed. Repeat in one year. 04/2019. EGD by Dr. Hussain Diaz. Irregular Z line was noted. No nodularity was seen. No [place] colored islands were noted. No varices were seen. Scarring from previous band ligation was noted. Repeat in one year. 12/2020. EGD by Dr. Jannette Reddy. No report available. Patient reports no banding. RADIOLOGY  11/2017. Abdominal MRI w/wo contrast.  Hepatic cirrhosis. Li-RADS 4 lesion in hepatic segment 3 (probable hepatocellular carcinoma). Faint arterial phase hyperenhancement throughout the left hepatic lobe. While infiltrative lesion not absolutely excluded, suspect finding reflects intralesional shunting related to the lesion. Scattered additional Li-RADS 3 hepatic lesions (indeterminate). 12/2017. Abdominal CT w/wo contrast. Complex hypervascular lesion in the periphery of segment 3 of the left hepatic lobe as described and measured above. Minimal interval increase in size. LI-RADS 4. Developing hepatoma is most likely. 06/2018. Ultrasound of the liver. Cirrhosis. Ovoid hyperechoic nodule in the right lobe, unchanged in the interval. Prior biopsy confirmed this as a hemangioma. 12/2018. Ultrasound of the liver. Coarsely heterogeneous echotexture with a nodular contour and prominent caudate lobe, in keeping with history of cirrhosis. Redemonstrated hyperechoic mass measuring 1.4 x 1.3 x 1.0 cm and the left hepatic lobe, in keeping with a hemangioma demonstrated on prior studies which was previously biopsied. 07/2019. Dynamic MRI of the liver. Slowly growing 2.7 cm mass without arterial enhancement, with washout in hepatic segment IVb. This mass measured up to 2.1 cm thousand 17 and 1.5 cm 2014. LIRADS 4. Fibrotic changes within the right liver have progressed since 2017. Hemangioma and associated shunting within the left lateral liver are unchanged since 2017.  12/2019. Dynamic MRI of the liver. Cirrhosis with no significant change in size or appearance of previously described T2 hypointense nodule in the right hepatic lobe. Given stability as well as current imaging features, this most likely represents a nodular area of liver parenchyma which is accentuated by the adjacent background of confluent fibrosis rather than a true mass. LR-2. Left hepatic lobe hemangioma and adjacent heterogeneous enhancement again identified, unchanged. LR-1. No new suspicious nodules or masses identified in the liver. 07/2020. Ultrasound of the liver. Cirrhosis. Left hepatic lobe hemangioma 1.4 cm. No suspicious focal hepatic mass. 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 The most recent laboratory studies indicate that the liver transaminases are normal, alkaline phosphatase is normal, tests of hepatic synthetic and metabolic function are normal, and the platelet count is depressed.  Will perform laboratory testing to monitor liver function and degree of liver injury. This will include hepatic panel, a CBC w/ diff, a BMP, a PT/INR, and an AFP-L3%. Complications of cirrhosis were discussed in detail. We discussed thrombocytopenia, portal hypertension, varices, GI bleeding, peripheral edema, ascites, hepatic encephalopathy, and hepatocellular carcinoma. We discussed the need for follow ups on a regular basis to monitor for complications. We discussed the need for every 6 month liver imaging studies. Chronic HCV, genotype 1. He completed twelve weeks of treatment with simeprevir and sofosbuvir in June 2014. He had a SVR 2 years post treatment. Peripheral edema. Resolved. Esophageal varicies without prior bleeding. He has had repeat EGDs with HALO procedure performed. No varices identified. Most recent EGD was performed by Dr. Jamal Osman. Patient reports that he did no have any esophageal varices. Mr. Yury Nice eports that he has H. Pylori. He reports that this has not yet been treated. I cannot find the report, so I cannot offer him treatment. I asked the patient to contact Dr. Jamal Osman' office to see if he actually has H. Pylori and to begin treatment if he does. Hepatic encephalopathy has not developed to date. There is no need for treatment with lactulose and/or xifaxan at this time. ETOH abuse. He has not consumed any alcohol since 01/2016. Continue AA. Received his 5 year sobriety medallion. The patient had a postive thalium stress test with reversible ischemia. He has a cardiac catheterization. No significant ASCAD identified. Thrombocytopenia secondary to cirrhosis. There is no evidence of overt bleeding. There is no indication for platelet transfusions or pharmacologic treatment to increase the platelet count. [address] screening. Imaging suggested that Mr. Yury Nice had developed [address] He was referred to Dr. Ingris Ng for evaluation.   The LI-RADS 4 lesion was biopsied and determined to be a hemangioma. The plan was to continue with advanced imaging, either MRI or CT, every 6 months moving forward. Lesion now classified as LI-RADS 2. We will therefore perform ultrasound alternating with advanced imaging at 6 month intervals. These, along with AFP's every 6 months, should suffice for [facility] screenings. Ultrasound was ordered today to be performed in 01/2021. All of the above issues were discussed with the patient. All questions were answered. The patient expressed a clear understanding of the above. 25 minutes total time spent with this patient with more than 50% of this time spent counseling and coordinating care as described above. [email or web address] in 6 months.       CJ Borrero-DINA  [facility] [place] of [address], [address] [place] [address] Otilia Valles, [address] [place] [address]   [phone]

## 2021-01-20 LAB
AFP L3 MFR SERPL: NORMAL % (ref 0–9.9)
AFP SERPL-MCNC: 3.9 NG/ML (ref 0–8)

## 2021-02-09 ENCOUNTER — HOSPITAL ENCOUNTER (OUTPATIENT)
Dept: ULTRASOUND IMAGING | Age: 66
Discharge: HOME | End: 2021-02-09
Payer: MEDICARE

## 2021-02-09 PROCEDURE — 76705 ECHO EXAM OF ABDOMEN: CPT

## 2021-07-19 ENCOUNTER — HOSPITAL ENCOUNTER (OUTPATIENT)
Dept: LAB | Age: 66
Discharge: HOME | End: 2021-07-19
Payer: MEDICARE

## 2021-07-19 ENCOUNTER — OFFICE VISIT (OUTPATIENT)
Dept: HEMATOLOGY | Age: 66
End: 2021-07-19
Payer: MEDICARE

## 2021-07-19 VITALS
HEIGHT: 70 IN | DIASTOLIC BLOOD PRESSURE: 85 MMHG | OXYGEN SATURATION: 98 % | SYSTOLIC BLOOD PRESSURE: 155 MMHG | BODY MASS INDEX: 28.2 KG/M2 | WEIGHT: 197 LBS | HEART RATE: 62 BPM | TEMPERATURE: 96.4 F | RESPIRATION RATE: 17 BRPM

## 2021-07-19 DIAGNOSIS — K74.60 CIRRHOSIS OF LIVER WITHOUT ASCITES, UNSPECIFIED HEPATIC CIRRHOSIS TYPE (HCC): Primary | ICD-10-CM

## 2021-07-19 DIAGNOSIS — K74.60 CIRRHOSIS OF LIVER WITHOUT ASCITES, UNSPECIFIED HEPATIC CIRRHOSIS TYPE (HCC): ICD-10-CM

## 2021-07-19 LAB
ALBUMIN SERPL-MCNC: 3.9 G/DL (ref 3.4–5)
ALBUMIN/GLOB SERPL: 1.1 {RATIO} (ref 0.8–1.7)
ALP SERPL-CCNC: 108 U/L (ref 45–117)
ALT SERPL-CCNC: 28 U/L (ref 16–61)
ANION GAP SERPL CALC-SCNC: 6 MMOL/L (ref 3–18)
AST SERPL-CCNC: 19 U/L (ref 10–38)
BASOPHILS # BLD: 0 K/UL (ref 0–0.1)
BASOPHILS NFR BLD: 1 % (ref 0–2)
BILIRUB DIRECT SERPL-MCNC: 0.2 MG/DL (ref 0–0.2)
BILIRUB SERPL-MCNC: 0.8 MG/DL (ref 0.2–1)
BUN SERPL-MCNC: 28 MG/DL (ref 7–18)
BUN/CREAT SERPL: 19 (ref 12–20)
CALCIUM SERPL-MCNC: 9.4 MG/DL (ref 8.5–10.1)
CHLORIDE SERPL-SCNC: 104 MMOL/L (ref 100–111)
CO2 SERPL-SCNC: 28 MMOL/L (ref 21–32)
CREAT SERPL-MCNC: 1.5 MG/DL (ref 0.6–1.3)
DIFFERENTIAL METHOD BLD: ABNORMAL
EOSINOPHIL # BLD: 0.2 K/UL (ref 0–0.4)
EOSINOPHIL NFR BLD: 5 % (ref 0–5)
ERYTHROCYTE [DISTWIDTH] IN BLOOD BY AUTOMATED COUNT: 13.4 % (ref 11.6–14.5)
GLOBULIN SER CALC-MCNC: 3.6 G/DL (ref 2–4)
GLUCOSE SERPL-MCNC: 148 MG/DL (ref 74–99)
HCT VFR BLD AUTO: 41 % (ref 36–48)
HGB BLD-MCNC: 13.3 G/DL (ref 13–16)
INR PPP: 1.2 (ref 0.8–1.2)
LYMPHOCYTES # BLD: 0.5 K/UL (ref 0.9–3.6)
LYMPHOCYTES NFR BLD: 11 % (ref 21–52)
MCH RBC QN AUTO: 27.9 PG (ref 24–34)
MCHC RBC AUTO-ENTMCNC: 32.4 G/DL (ref 31–37)
MCV RBC AUTO: 86.1 FL (ref 74–97)
MONOCYTES # BLD: 0.3 K/UL (ref 0.05–1.2)
MONOCYTES NFR BLD: 7 % (ref 3–10)
NEUTS SEG # BLD: 3.2 K/UL (ref 1.8–8)
NEUTS SEG NFR BLD: 75 % (ref 40–73)
PLATELET # BLD AUTO: 71 K/UL (ref 135–420)
PMV BLD AUTO: 13 FL (ref 9.2–11.8)
POTASSIUM SERPL-SCNC: 4.5 MMOL/L (ref 3.5–5.5)
PROT SERPL-MCNC: 7.5 G/DL (ref 6.4–8.2)
PROTHROMBIN TIME: 15.1 SEC (ref 11.5–15.2)
RBC # BLD AUTO: 4.76 M/UL (ref 4.35–5.65)
SODIUM SERPL-SCNC: 138 MMOL/L (ref 136–145)
WBC # BLD AUTO: 4.2 K/UL (ref 4.6–13.2)

## 2021-07-19 PROCEDURE — G8419 CALC BMI OUT NRM PARAM NOF/U: HCPCS | Performed by: NURSE PRACTITIONER

## 2021-07-19 PROCEDURE — 85025 COMPLETE CBC W/AUTO DIFF WBC: CPT

## 2021-07-19 PROCEDURE — 99214 OFFICE O/P EST MOD 30 MIN: CPT | Performed by: NURSE PRACTITIONER

## 2021-07-19 PROCEDURE — G8754 DIAS BP LESS 90: HCPCS | Performed by: NURSE PRACTITIONER

## 2021-07-19 PROCEDURE — 80048 BASIC METABOLIC PNL TOTAL CA: CPT

## 2021-07-19 PROCEDURE — 36415 COLL VENOUS BLD VENIPUNCTURE: CPT

## 2021-07-19 PROCEDURE — 85610 PROTHROMBIN TIME: CPT

## 2021-07-19 PROCEDURE — G8427 DOCREV CUR MEDS BY ELIG CLIN: HCPCS | Performed by: NURSE PRACTITIONER

## 2021-07-19 PROCEDURE — G0463 HOSPITAL OUTPT CLINIC VISIT: HCPCS | Performed by: NURSE PRACTITIONER

## 2021-07-19 PROCEDURE — 1101F PT FALLS ASSESS-DOCD LE1/YR: CPT | Performed by: NURSE PRACTITIONER

## 2021-07-19 PROCEDURE — 80076 HEPATIC FUNCTION PANEL: CPT

## 2021-07-19 PROCEDURE — G8536 NO DOC ELDER MAL SCRN: HCPCS | Performed by: NURSE PRACTITIONER

## 2021-07-19 PROCEDURE — G8432 DEP SCR NOT DOC, RNG: HCPCS | Performed by: NURSE PRACTITIONER

## 2021-07-19 PROCEDURE — 82107 ALPHA-FETOPROTEIN L3: CPT

## 2021-07-19 PROCEDURE — G9711 PT HX TOT COL OR COLON CA: HCPCS | Performed by: NURSE PRACTITIONER

## 2021-07-19 PROCEDURE — G8753 SYS BP > OR = 140: HCPCS | Performed by: NURSE PRACTITIONER

## 2021-07-19 RX ORDER — HYDROCHLOROTHIAZIDE 25 MG/1
TABLET ORAL
COMMUNITY
Start: 2021-07-06 | End: 2022-01-24

## 2021-07-19 RX ORDER — OMEPRAZOLE 20 MG/1
CAPSULE, DELAYED RELEASE ORAL
COMMUNITY
Start: 2021-07-06 | End: 2021-07-19

## 2021-07-19 RX ORDER — AMLODIPINE BESYLATE 10 MG/1
TABLET ORAL
COMMUNITY
Start: 2021-07-06

## 2021-07-19 NOTE — PROGRESS NOTES
Kiki Osman MD, MD Gael Toscano PA-C Alexandra Rout, [facility]     April S Rigo, [facility]   Otilia Bateman, [facility]    Yfn House, [facility]       Mark Dai Herb [address]    at [address], [address] [place] [address], Sher  22.    [phone]    FAX: [address] [place] [address], [address], [address]    [phone]    FAX: [phone]       Patient Care Team:  Dee Sheffield MD as PCP - General (Family Medicine)  Alfonso Reid MD (Gastroenterology)  Mehul Terrazas MD (Internal Medicine)  Will Mills MD (Gastroenterology)  Miko Newell MD (Nephrology)        Problem List  Date Reviewed: 7/19/2021        Codes Class Noted    Hepatitis C virus infection cured after antiviral drug therapy ICD-10-CM: Z86.19  ICD-9-CM: V12.09  1/19/2021        Type 2 diabetes mellitus with nephropathy [facility]) ICD-10-CM: E11.21  ICD-9-CM: 250.40, 583.81  1/30/2018        H/O alcohol abuse ICD-10-CM: F10.11  ICD-9-CM: 305.03  Unknown    Overview Signed 4/27/2016  6:37 PM by Sahil Baird     quit few months ago, particpates in [place] [address]             H/O tobacco use, presenting hazards to health ICD-10-CM: Z87.891  ICD-9-CM: V15.82  Unknown    Overview Signed 4/27/2016  6:37 PM by Sahil Baird     quit few months ago             CAD (coronary artery disease), native coronary artery ICD-10-CM: I25.10  ICD-9-CM: 414.01  Unknown    Overview Addendum 1/3/2014  3:55 PM by Sahil Polanco     p/mLAD 60% ((FFR 0.84). Otherwise mild coronary artery disease (march 2013).  LV EF 65% (echo feb 2013)             CKD (chronic kidney disease) ICD-10-CM: N18.9  ICD-9-CM: 585.9  2/26/2013        Esophageal varices ([facility] [address] [address] ICD-10-CM: I85.00  ICD-9-CM: 456.1  2/26/2013        Cirrhosis ([address] ICD-10-CM: K74.60  ICD-9-CM: 571.5  2/2/2013    Overview Signed 2/2/2013  5:22 PM by Dana Bermudez MD     Secondary to chronic HCV             Ascites ICD-10-CM: R18.8  ICD-9-CM: 789.59  2/2/2013        Hypertension ICD-10-CM: I10  ICD-9-CM: 401.9  5/23/2011        Ventral hernia, unspecified, without mention of obstruction or gangrene ICD-10-CM: K43.9  ICD-9-CM: 553.20  5/23/2011        Hyperlipidemia ICD-10-CM: E78.5  ICD-9-CM: 272.4  5/23/2011        Type II or unspecified type diabetes mellitus without mention of complication, not stated as uncontrolled ICD-10-CM: E11.9  ICD-9-CM: 250.00  5/23/2011        GERD (gastroesophageal reflux disease) ICD-10-CM: K21.9  ICD-9-CM: 530.81  5/23/2011        St's esophagus ICD-10-CM: K22.70  ICD-9-CM: 530.85  5/23/2011        Thrombocytopenia, secondary to cirrhosis ICD-10-CM: D69.59  ICD-9-CM: 287.49  5/23/2011              [facility] from chronic HCV. The HCV has been treated and cured. The active problem list, all pertinent past medical history, medications, liver histology, endoscopic studies, radiologic findings and laboratory findings related to the liver disorder were reviewed with the patient. Ascites resolved. The patient has not developed hepatic encephalopathy. The patient has esophageal varices without bleeding. The patient has began, but has not completed liver transplant evaluation testing. The Thallium stress test demonstrated a fixed and 2 small reversible lesions. Cardiac catheterization did not indicate the need for stenting. Mr. Aman Parsons was noted to have a nodule on his thyroid while being worked up for a pleural effusion. He had a biopsy that was negative for malignancy. The patient had a LI-RADS 4 lesions from abdominal MRI performed in 11/2017.   This was worked up and biopsied. Biopsy demonstrated that this lesion is a hemangioma. We have discussed the liver transplant process and various centers he could go to for liver transplantation. He would like to be seen at [place], [place] if this becomes neccessary. Mr. Lona Schmitz completed 12 weeks of treatment with sofosbuvir and simeprevir in June 2014. He had a SVR two years post treatment. Mr. Jean Pierre Eddy denies any further alcohol use since January 2016. He states that he continues to participate in [place]. Dr. Victor Manuel Garcia has performed HALO procedures for St esophagus. Dr. Victor Manuel Garcia continues to follow the patient. Mr. Lona Schmitz is overall feeling well since his last office visit. His lower extremity edema has completely resolved and he is currently not taking any diuretics. He deneis any shortness of breath. He continues to struggle with arthralgias due to osteoarthritis. He does have difficulty completing daily activities due to arthritis. The patient has not experienced problems concentrating, swelling of the abdomen, swelling of the lower extremities, hematemesis, hematochezia. Since the last office appointment the patient has:  Had no changes in the liver disease. Continues in [place]. Had EGD and Colonoscopy by Dr. Maya Meeks in 12/2020. 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 The most recent laboratory studies indicate that the liver transaminases are normal, alkaline phosphatase is normal, tests of hepatic synthetic and metabolic function are normal, and the platelet count is depressed. Will perform laboratory testing to monitor liver function and degree of liver injury. This will include hepatic panel, a CBC w/ diff, a BMP, a PT/INR, and an AFP-L3%. Complications of cirrhosis were discussed in detail.  We discussed thrombocytopenia, portal hypertension, varices, GI bleeding, peripheral edema, ascites, hepatic encephalopathy, and hepatocellular carcinoma. We discussed the need for follow ups on a regular basis to monitor for complications. We discussed the need for every 6 month liver imaging studies. Chronic HCV, genotype 1. He completed twelve weeks of treatment with simeprevir and sofosbuvir in June 2014. He had a SVR 2 years post treatment. Peripheral edema. Resolved. Esophageal varicies without prior bleeding. He has had repeat EGDs with HALO procedure performed. No varices identified. Most recent EGD was performed by Dr. Kareem Maher. Patient reports that he did no have any esophageal varices. Hepatic encephalopathy has not developed to date. There is no need for treatment with lactulose and/or xifaxan at this time. ETOH abuse. He has not consumed any alcohol since 01/2016. Continue AA. Received his 5 year sobriety medallion. The patient had a postive thalium stress test with reversible ischemia. He has a cardiac catheterization. No significant ASCAD identified. Thrombocytopenia secondary to cirrhosis. There is no evidence of overt bleeding. There is no indication for platelet transfusions or pharmacologic treatment to increase the platelet count. [address] screening. Imaging suggested that Mr. Betzaida Mcdowell had developed [address] He was referred to Dr. Sienna Tyler for evaluation. The LI-RADS 4 lesion was biopsied and determined to be a hemangioma. The plan was to continue with advanced imaging, either MRI or CT, every 6 months moving forward. Lesion now classified as LI-RADS 2. We will therefore perform ultrasound alternating with advanced imaging at 6 month intervals. These, along with AFP's every 6 months, should suffice for [address] screenings. Ultrasound was ordered today to be performed in 01/2021. All of the above issues were discussed with the patient. All questions were answered. The patient expressed a clear understanding of the above.     ALLERGIES:  No Known Allergies    Current Outpatient Medications   Medication Sig    amLODIPine (NORVASC) 10 mg tablet Take 1 tablet by mouth daily    hydroCHLOROthiazide (HYDRODIURIL) 25 mg tablet     alogliptin (NESINA) 25 mg tablet Take 25 mg by mouth daily.  nitroglycerin (NITROSTAT) 0.4 mg SL tablet 1 Tab by SubLINGual route every five (5) minutes as needed for Chest Pain. Up to 3 doses.  anastrozole (ARIMIDEX) 1 mg tablet Take 1 mg by mouth every seven (7) days.  testosterone 30 mg/actuation (1.5 mL) slpm by TransDERmal route.  terazosin (HYTRIN) 5 mg capsule Take 5 mg by mouth daily.  atorvastatin (LIPITOR) 10 mg tablet Take 20 mg by mouth daily.  metoprolol succinate (TOPROL-XL) 50 mg XL tablet Take 50 mg by mouth daily. Indications: HYPERTENSION    glipiZIDE SR (GLUCOTROL) 10 mg CR tablet Take 10 mg by mouth daily. Indications: type 2 diabetes mellitus    OMEPRAZOLE (PRILOSEC PO) Take 20 mg by mouth daily. Indications: GERD     No current facility-administered medications for this visit. SYSTEM REVIEW NOT RELATED TO LIVER DISEASE OR REVIEWED ABOVE:  Constitution systems: Negative for fever, chills, weight gain, weight loss. Eyes: Negative for visual changes. ENT: Negative for sore throat, painful swallowing. Respiratory: Negative for cough, hemoptysis, SOB. Cardiology: Negative for chest pain, palpitations. GI:  Negative for constipation or diarrhea. : Negative for urinary frequency, dysuria, hematuria, nocturia. Skin: Negative for rash. Hematology: Positive for easy bruising. Negative for blood clots. Musculo-skelatal: Negative for back pain, muscle pain, weakness. Neurologic: Negative for headaches, dizziness, vertigo, memory problems not related to HE. Psychology: Negative for anxiety, depression. FAMILY/SOCIAL HISTORY:  The patient is . The patient has 2 children. Stoped smoking 1/2016. History of heavy alcohol use. Last alcohol consumption was 1/2016. The patient retired in 2010. PHYSICAL EXAMINATION:  Visit Vitals  BP (!) 155/85 (BP 1 Location: Left upper arm, BP Patient Position: Sitting, BP Cuff Size: Small adult)   Pulse 62   Temp (!) 96.4 °F (35.8 °C)   Resp 17   Ht 5' 10\" (1.778 m)   Wt 197 lb (89.4 kg)   SpO2 98%   BMI 28.27 kg/m²       PHYSICAL EXAMINATION:  VS: per nursing note  General: No acute distress. Eyes: Sclera anicteric. ENT: No oral lesions. Thyroid normal.  Nodes: No adenopathy. Skin: No spider angiomata. No jaundice. No palmar erythema. Respiratory: Lungs clear to auscultation. Cardiovascular: Regular heart rate. No murmurs. No JVD. Abdomen: Soft non-tender, liver size normal to percussion/palpation. Spleen not palpable. No obvious ascites. Extremities: No edema. No muscle wasting. No gross arthritic changes. Neurologic: Alert and oriented. Cranial nerves grossly intact. No asterixis. LABORATORY:  Liver [place] of [address] Units 1/19/2021 7/15/2020   WBC 4.6 - 13.2 K/uL 3.0 (L) 2.6 (L)   ANC 1.8 - 8.0 K/UL 2.1 1.9   HGB 13.0 - 16.0 g/dL 13.1 12.0 (L)    - 420 K/uL 54 (L) 58 (L)   INR 0.8 - 1.2   1.2 1.2   AST 10 - 38 U/L 21 36   ALT 16 - 61 U/L 28 37   Alk Phos 45 - 117 U/L 108 109   Bili, Total 0.2 - 1.0 MG/DL 0.7 0.7   Bili, Direct 0.0 - 0.2 MG/DL 0.2 0.2   Albumin 3.4 - 5.0 g/dL 4.2 4.1   BUN 7.0 - 18 MG/DL 36 (H) 38 (H)   Creat 0.6 - 1.3 MG/DL 1.38 (H) 1.43 (H)   Creat (iSTAT) 0.6 - 1.3 MG/DL     Na 136 - 145 mmol/L 139 141   K 3.5 - 5.5 mmol/L 5.9 (H) 5.1   Cl 100 - 111 mmol/L 110 109   CO2 21 - 32 mmol/L 28 28   Glucose 74 - 99 mg/dL 67 (L) 86     Cancer Screening Latest Ref Rng & Units 7/15/2020 1/15/2020   AFP, Serum 0.0 - 8.0 ng/mL 3.4 4.4   AFP-L3% 0.0 - 9.9 % Comment Comment     SEROLOGY  2/2012. Anti-HCV negative, CMV IgG positive, EBV IgG positive,     LIVER HISTOLOGY  12/2017. Liver mass biopsy. Hepatocytes show some mild steatosis.  The lesion in this case is a hemangioma, and there are several fragments of it along with the liver tissue, characterized by a fibrous background and scattered simple, capillary-like vessels. The architecture is cavernous. There is no evidence of malignancy. 12/2018. TRANSIENT HEPATIC ELASTOGRAPHY:   E Range: 8.9-13.9 kPa  E Mean: 11.5 kPa  E Median: 11.6 kPa  E Std: 1.7 kPa    07/2020. TRANSIENT HEPATIC ELASTOGRAPHY:   E Range: 6.8-14.9 kPa  E Mean: 10.0 kPa  E Median: 8.9 kPa  E Std: 2.8 kPa    ENDOSCOPIC PROCEDURES  2/2017:  EGD with HALO per Dr. Denise Jean. Repeat in 3 months. 05/2017. EGD by Dr. Tasha Arthur. HALO for St's. Repeat in 3 months. 08/2017. EGD by Dr. Tasha Arthur. HALO for St's. Repeat in 3 months. 02/2018. EGD by Dr. Tasha Arthur. Esophagus: Few small 3-4 mm salmon colored islands were seen at the 38 cm. No nodularity was seen. Grade 1 varices were seen. Stomach: Streaky erythema was noted in the antrum radiating towards the pylorus, consistent with gastric antral vascular ectasia. Duodenum/jejunum:normal.  Variceal ablation (including the St's mucosa) was performed with 2 of bands placed. Repeat in one year. 04/2019. EGD by Dr. Tasha Arthur. Irregular Z line was noted. No nodularity was seen. No [place] colored islands were noted. No varices were seen. Scarring from previous band ligation was noted. Repeat in one year. 12/2020. EGD by Dr. Adriana Jacobson. No report available. Patient reports no banding. RADIOLOGY  11/2017. Abdominal MRI w/wo contrast.  Hepatic cirrhosis. Li-RADS 4 lesion in hepatic segment 3 (probable hepatocellular carcinoma). Faint arterial phase hyperenhancement throughout the left hepatic lobe. While infiltrative lesion not absolutely excluded, suspect finding reflects intralesional shunting related to the lesion. Scattered additional Li-RADS 3 hepatic lesions (indeterminate). 12/2017.   Abdominal CT w/wo contrast. Complex hypervascular lesion in the periphery of segment 3 of the left hepatic lobe as described and measured above. Minimal interval increase in size. LI-RADS 4. Developing hepatoma is most likely. 06/2018. Ultrasound of the liver. Cirrhosis. Ovoid hyperechoic nodule in the right lobe, unchanged in the interval. Prior biopsy confirmed this as a hemangioma. 12/2018. Ultrasound of the liver. Coarsely heterogeneous echotexture with a nodular contour and prominent caudate lobe, in keeping with history of cirrhosis. Redemonstrated hyperechoic mass measuring 1.4 x 1.3 x 1.0 cm and the left hepatic lobe, in keeping with a hemangioma demonstrated on prior studies which was previously biopsied. 07/2019. Dynamic MRI of the liver. Slowly growing 2.7 cm mass without arterial enhancement, with washout in hepatic segment IVb. This mass measured up to 2.1 cm thousand 17 and 1.5 cm 2014. LIRADS 4. Fibrotic changes within the right liver have progressed since 2017. Hemangioma and associated shunting within the left lateral liver are unchanged since 2017.  12/2019. Dynamic MRI of the liver. Cirrhosis with no significant change in size or appearance of previously described T2 hypointense nodule in the right hepatic lobe. Given stability as well as current imaging features, this most likely represents a nodular area of liver parenchyma which is accentuated by the adjacent background of confluent fibrosis rather than a true mass. LR-2. Left hepatic lobe hemangioma and adjacent heterogeneous enhancement again identified, unchanged. LR-1. No new suspicious nodules or masses identified in the liver. 07/2020. Ultrasound of the liver. Cirrhosis. Left hepatic lobe hemangioma 1.4 cm. No suspicious focal hepatic mass. 02/2021. Ultrasound of the liver. Slightly coarsened increased echotexture. 1.0 x 1.1 cm hyperechoic nodule in the left hepatic lobe which corresponds with hemangioma described on prior MRI .     25 minutes total time spent with this patient with more than 50% of this time spent counseling and coordinating care as described above. [address] in 6 months.       MICHAEL New  [facility] [place] [facility]  [facility], [address] [place] [address], [address] [place] [address]   [phone]

## 2021-07-20 LAB
AFP L3 MFR SERPL: NORMAL % (ref 0–9.9)
AFP SERPL-MCNC: 4.6 NG/ML (ref 0–8)

## 2021-07-22 ENCOUNTER — HOSPITAL ENCOUNTER (OUTPATIENT)
Dept: ULTRASOUND IMAGING | Age: 66
Discharge: HOME | End: 2021-07-22
Payer: MEDICARE

## 2021-07-22 DIAGNOSIS — K74.60 CIRRHOSIS OF LIVER WITHOUT ASCITES, UNSPECIFIED HEPATIC CIRRHOSIS TYPE (HCC): ICD-10-CM

## 2021-07-22 PROCEDURE — 76705 ECHO EXAM OF ABDOMEN: CPT

## 2021-08-10 ENCOUNTER — TELEPHONE (OUTPATIENT)
Dept: HEMATOLOGY | Age: 66
End: 2021-08-10

## 2022-01-19 ENCOUNTER — OFFICE VISIT (OUTPATIENT)
Dept: HEMATOLOGY | Age: 67
End: 2022-01-19
Payer: MEDICARE

## 2022-01-19 ENCOUNTER — HOSPITAL ENCOUNTER (OUTPATIENT)
Dept: LAB | Age: 67
Discharge: HOME | End: 2022-01-19
Payer: MEDICARE

## 2022-01-19 VITALS
SYSTOLIC BLOOD PRESSURE: 133 MMHG | HEART RATE: 57 BPM | WEIGHT: 199 LBS | BODY MASS INDEX: 28.49 KG/M2 | HEIGHT: 70 IN | TEMPERATURE: 98.5 F | OXYGEN SATURATION: 98 % | RESPIRATION RATE: 14 BRPM | DIASTOLIC BLOOD PRESSURE: 77 MMHG

## 2022-01-19 DIAGNOSIS — K74.60 CIRRHOSIS OF LIVER WITHOUT ASCITES, UNSPECIFIED HEPATIC CIRRHOSIS TYPE (HCC): ICD-10-CM

## 2022-01-19 DIAGNOSIS — K74.60 CIRRHOSIS OF LIVER WITHOUT ASCITES, UNSPECIFIED HEPATIC CIRRHOSIS TYPE (HCC): Primary | ICD-10-CM

## 2022-01-19 LAB
ALBUMIN SERPL-MCNC: 3.5 G/DL (ref 3.4–5)
ALBUMIN/GLOB SERPL: 0.9 {RATIO} (ref 0.8–1.7)
ALP SERPL-CCNC: 133 U/L (ref 45–117)
ALT SERPL-CCNC: 37 U/L (ref 16–61)
ANION GAP SERPL CALC-SCNC: 2 MMOL/L (ref 3–18)
AST SERPL-CCNC: 25 U/L (ref 10–38)
BASOPHILS # BLD: 0 K/UL (ref 0–0.1)
BASOPHILS NFR BLD: 1 % (ref 0–2)
BILIRUB DIRECT SERPL-MCNC: 0.1 MG/DL (ref 0–0.2)
BILIRUB SERPL-MCNC: 0.4 MG/DL (ref 0.2–1)
BUN SERPL-MCNC: 27 MG/DL (ref 7–18)
BUN/CREAT SERPL: 17 (ref 12–20)
CALCIUM SERPL-MCNC: 9.2 MG/DL (ref 8.5–10.1)
CHLORIDE SERPL-SCNC: 112 MMOL/L (ref 100–111)
CO2 SERPL-SCNC: 29 MMOL/L (ref 21–32)
CREAT SERPL-MCNC: 1.57 MG/DL (ref 0.6–1.3)
DIFFERENTIAL METHOD BLD: ABNORMAL
EOSINOPHIL # BLD: 0.2 K/UL (ref 0–0.4)
EOSINOPHIL NFR BLD: 6 % (ref 0–5)
ERYTHROCYTE [DISTWIDTH] IN BLOOD BY AUTOMATED COUNT: 13.4 % (ref 11.6–14.5)
GLOBULIN SER CALC-MCNC: 3.7 G/DL (ref 2–4)
GLUCOSE SERPL-MCNC: 112 MG/DL (ref 74–99)
HCT VFR BLD AUTO: 42.4 % (ref 36–48)
HGB BLD-MCNC: 13.4 G/DL (ref 13–16)
IMM GRANULOCYTES # BLD AUTO: 0 K/UL (ref 0–0.04)
IMM GRANULOCYTES NFR BLD AUTO: 1 % (ref 0–0.5)
INR PPP: 1.2 (ref 0.8–1.2)
LYMPHOCYTES # BLD: 0.5 K/UL (ref 0.9–3.6)
LYMPHOCYTES NFR BLD: 16 % (ref 21–52)
MCH RBC QN AUTO: 26.8 PG (ref 24–34)
MCHC RBC AUTO-ENTMCNC: 31.6 G/DL (ref 31–37)
MCV RBC AUTO: 84.8 FL (ref 78–100)
MONOCYTES # BLD: 0.2 K/UL (ref 0.05–1.2)
MONOCYTES NFR BLD: 6 % (ref 3–10)
NEUTS SEG # BLD: 2.2 K/UL (ref 1.8–8)
NEUTS SEG NFR BLD: 70 % (ref 40–73)
NRBC # BLD: 0 K/UL (ref 0–0.01)
NRBC BLD-RTO: 0 PER 100 WBC
PLATELET # BLD AUTO: 75 K/UL (ref 135–420)
PMV BLD AUTO: 12.1 FL (ref 9.2–11.8)
POTASSIUM SERPL-SCNC: 5 MMOL/L (ref 3.5–5.5)
PROT SERPL-MCNC: 7.2 G/DL (ref 6.4–8.2)
PROTHROMBIN TIME: 14.3 SEC (ref 11.5–15.2)
RBC # BLD AUTO: 5 M/UL (ref 4.35–5.65)
SODIUM SERPL-SCNC: 143 MMOL/L (ref 136–145)
WBC # BLD AUTO: 3.1 K/UL (ref 4.6–13.2)

## 2022-01-19 PROCEDURE — G8432 DEP SCR NOT DOC, RNG: HCPCS | Performed by: NURSE PRACTITIONER

## 2022-01-19 PROCEDURE — 80048 BASIC METABOLIC PNL TOTAL CA: CPT

## 2022-01-19 PROCEDURE — G8419 CALC BMI OUT NRM PARAM NOF/U: HCPCS | Performed by: NURSE PRACTITIONER

## 2022-01-19 PROCEDURE — 85025 COMPLETE CBC W/AUTO DIFF WBC: CPT

## 2022-01-19 PROCEDURE — 1101F PT FALLS ASSESS-DOCD LE1/YR: CPT | Performed by: NURSE PRACTITIONER

## 2022-01-19 PROCEDURE — 80076 HEPATIC FUNCTION PANEL: CPT

## 2022-01-19 PROCEDURE — G8754 DIAS BP LESS 90: HCPCS | Performed by: NURSE PRACTITIONER

## 2022-01-19 PROCEDURE — G0463 HOSPITAL OUTPT CLINIC VISIT: HCPCS | Performed by: NURSE PRACTITIONER

## 2022-01-19 PROCEDURE — G8536 NO DOC ELDER MAL SCRN: HCPCS | Performed by: NURSE PRACTITIONER

## 2022-01-19 PROCEDURE — G8427 DOCREV CUR MEDS BY ELIG CLIN: HCPCS | Performed by: NURSE PRACTITIONER

## 2022-01-19 PROCEDURE — 82107 ALPHA-FETOPROTEIN L3: CPT

## 2022-01-19 PROCEDURE — 91200 LIVER ELASTOGRAPHY: CPT | Performed by: NURSE PRACTITIONER

## 2022-01-19 PROCEDURE — 99214 OFFICE O/P EST MOD 30 MIN: CPT | Performed by: NURSE PRACTITIONER

## 2022-01-19 PROCEDURE — 85610 PROTHROMBIN TIME: CPT

## 2022-01-19 PROCEDURE — G8752 SYS BP LESS 140: HCPCS | Performed by: NURSE PRACTITIONER

## 2022-01-19 PROCEDURE — 36415 COLL VENOUS BLD VENIPUNCTURE: CPT

## 2022-01-19 PROCEDURE — G9711 PT HX TOT COL OR COLON CA: HCPCS | Performed by: NURSE PRACTITIONER

## 2022-01-19 NOTE — PROGRESS NOTES
[address] [facility], [place], [address], [facility], [place]      Tiffanierhiannon Cleary, PA-DINA Rahman, [facility]     Azalia Sierra, [facility]   CJ Joe-DINA Acosta, [facility]       Mark Dai Herb De [address] 136    at [address], [facility] Sher Cabrera  22.    [phone]    FAX: [address] [place] [address], [address]    [phone]    FAX: [phone]       Patient Care Team:  Nicole Nolasco MD as PCP - General (Family Medicine)  Jennifer Silva MD (Gastroenterology)  Ke Ventura MD (Internal Medicine)  Tomas Nieves MD (Gastroenterology)  Zully Allen MD (Nephrology)        Problem List  Date Reviewed: 7/19/2021          Codes Class Noted    Hepatitis C virus infection cured after antiviral drug therapy ICD-10-CM: Z86.19  ICD-9-CM: V12.09  1/19/2021        Type 2 diabetes mellitus with nephropathy [facility]) ICD-10-CM: E11.21  ICD-9-CM: 250.40, 583.81  1/30/2018        H/O alcohol abuse ICD-10-CM: F10.11  ICD-9-CM: 305.03  Unknown    Overview Signed 4/27/2016  6:37 PM by Sahil Espinal     quit few months ago, particpates in [place] [address]             H/O tobacco use, presenting hazards to health ICD-10-CM: Z87.891  ICD-9-CM: V15.82  Unknown    Overview Signed 4/27/2016  6:37 PM by Sahil Espinal     quit few months ago             CAD (coronary artery disease), native coronary artery ICD-10-CM: I25.10  ICD-9-CM: 414.01  Unknown    Overview Addendum 1/3/2014  3:55 PM by Sahil Polanco     p/mLAD 60% ((FFR 0.84). Otherwise mild coronary artery disease (march 2013).  LV EF 65% (echo feb 2013)             CKD (chronic kidney disease) ICD-10-CM: N18.9  ICD-9-CM: 585.9  2/26/2013        Esophageal varices (HCC) ICD-10-CM: I85.00  ICD-9-CM: 456.1  2/26/2013        Cirrhosis ([facility] [address] ICD-10-CM: K74.60  ICD-9-CM: 571.5  2/2/2013    Overview Signed 2/2/2013  5:22 PM by Nestor Glover MD     Secondary to chronic HCV             Ascites ICD-10-CM: R18.8  ICD-9-CM: 789.59  2/2/2013        Hypertension ICD-10-CM: I10  ICD-9-CM: 401.9  5/23/2011        Ventral hernia, unspecified, without mention of obstruction or gangrene ICD-10-CM: K43.9  ICD-9-CM: 553.20  5/23/2011        Hyperlipidemia ICD-10-CM: E78.5  ICD-9-CM: 272.4  5/23/2011        Type II or unspecified type diabetes mellitus without mention of complication, not stated as uncontrolled ICD-10-CM: E11.9  ICD-9-CM: 250.00  5/23/2011        GERD (gastroesophageal reflux disease) ICD-10-CM: K21.9  ICD-9-CM: 530.81  5/23/2011        St's esophagus ICD-10-CM: K22.70  ICD-9-CM: 530.85  5/23/2011        Thrombocytopenia, secondary to cirrhosis ICD-10-CM: D69.59  ICD-9-CM: 287.49  5/23/2011              Devin Hubbard returns to the [facility] for fibroscan assessment of hepatic fibrosis and for monitoring of cirrhosis from chronic HCV. The HCV has been treated and cured. The active problem list, all pertinent past medical history, medications, liver histology, endoscopic studies, radiologic findings and laboratory findings related to the liver disorder were reviewed with the patient. Ascites resolved. The patient has not developed hepatic encephalopathy. The patient has esophageal varices without bleeding. The patient has began, but has not completed liver transplant evaluation testing. The Thallium stress test demonstrated a fixed and 2 small reversible lesions. Cardiac catheterization did not indicate the need for stenting. Mr. Kirstie Osman was noted to have a nodule on his thyroid while being worked up for a pleural effusion. He had a biopsy that was negative for malignancy.       The patient had a LI-RADS 4 lesions from abdominal MRI performed in 11/2017. This was worked up and biopsied. Biopsy demonstrated that this lesion is a hemangioma. We have discussed the liver transplant process and various centers he could go to for liver transplantation. He would like to be seen at [place], [place] if this becomes neccessary. Mr. Kunal Ling completed 12 weeks of treatment with sofosbuvir and simeprevir in June 2014. He had a SVR two years post treatment. Mr. Steven Hermoslilo denies any further alcohol use since January 2016. He states that he continues to participate in [place]. Dr. Elliot Malone has performed HALO procedures for St esophagus. Dr. Elliot Malone continues to follow the patient. Mr. Kunal Ling is overall feeling well since his last office visit. His lower extremity edema has completely resolved and he is currently not taking any diuretics. He deneis any shortness of breath. He continues to struggle with arthralgias due to osteoarthritis. He does have difficulty completing daily activities due to arthritis. The patient has not experienced problems concentrating, swelling of the abdomen, swelling of the lower extremities, hematemesis, hematochezia. Since the last office appointment the patient has:  Had no changes in the liver disease. Continues in [place]. 6 year sobriety. 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 The most recent laboratory studies indicate that the liver transaminases are normal, alkaline phosphatase is normal, tests of hepatic synthetic and metabolic function are normal, and the platelet count is depressed. Will perform laboratory testing to monitor liver function and degree of liver injury. This will include hepatic panel, a CBC w/ diff, a BMP, a PT/INR, and an AFP-L3%. Complications of cirrhosis were discussed in detail.  We discussed thrombocytopenia, portal hypertension, varices, GI bleeding, peripheral edema, ascites, hepatic encephalopathy, and hepatocellular carcinoma. We discussed the need for follow ups on a regular basis to monitor for complications. We discussed the need for every 6 month liver imaging studies. Today's fibroscan analysis indicates cirrhosis. There is no evidence of fatty liver disease. Will repeat the fibroscan in one year. Earlier if clinically indicated. Chronic HCV, genotype 1. He completed twelve weeks of treatment with simeprevir and sofosbuvir in June 2014. He had a SVR 2 years post treatment. Peripheral edema. Resolved. Esophageal varicies without prior bleeding. He has had repeat EGDs with HALO procedure performed. No varices identified. Most recent EGD was performed by Dr. Sp Valencia. Patient reports that he did no have any esophageal varices. Hepatic encephalopathy has not developed to date. There is no need for treatment with lactulose and/or xifaxan at this time. ETOH abuse. He has not consumed any alcohol since 01/2016. Continue AA. Received his 5 year sobriety medallion. The patient had a postive thalium stress test with reversible ischemia. He has a cardiac catheterization. No significant ASCAD identified. Thrombocytopenia secondary to cirrhosis. There is no evidence of overt bleeding. There is no indication for platelet transfusions or pharmacologic treatment to increase the platelet count. [address] screening. Imaging suggested that Mr. Adi Dewitt had developed [address] He was referred to Dr. Lashawn Cortez for evaluation. The LI-RADS 4 lesion was biopsied and determined to be a hemangioma. The plan was to continue with advanced imaging, either MRI or CT, every 6 months moving forward. Lesion now classified as LI-RADS 2. We will therefore perform ultrasound alternating with advanced imaging at 6 month intervals. These, along with AFP's every 6 months, should suffice for [address] screenings. Ultrasound was ordered today to be performed in 01/2021.      All of the above issues were discussed with the patient. All questions were answered. The patient expressed a clear understanding of the above. ALLERGIES:  No Known Allergies    Current Outpatient Medications   Medication Sig    amLODIPine (NORVASC) 10 mg tablet Take 1 tablet by mouth daily    hydroCHLOROthiazide (HYDRODIURIL) 25 mg tablet     alogliptin (NESINA) 25 mg tablet Take 25 mg by mouth daily.  nitroglycerin (NITROSTAT) 0.4 mg SL tablet 1 Tab by SubLINGual route every five (5) minutes as needed for Chest Pain. Up to 3 doses.  anastrozole (ARIMIDEX) 1 mg tablet Take 1 mg by mouth every seven (7) days.  testosterone 30 mg/actuation (1.5 mL) slpm by TransDERmal route.  terazosin (HYTRIN) 5 mg capsule Take 5 mg by mouth daily.  atorvastatin (LIPITOR) 10 mg tablet Take 20 mg by mouth daily.  metoprolol succinate (TOPROL-XL) 50 mg XL tablet Take 50 mg by mouth daily. Indications: HYPERTENSION    glipiZIDE SR (GLUCOTROL) 10 mg CR tablet Take 10 mg by mouth daily. Indications: type 2 diabetes mellitus    OMEPRAZOLE (PRILOSEC PO) Take 20 mg by mouth daily. Indications: GERD     No current facility-administered medications for this visit. SYSTEM REVIEW NOT RELATED TO LIVER DISEASE OR REVIEWED ABOVE:  Constitution systems: Negative for fever, chills, weight gain, weight loss. Eyes: Negative for visual changes. ENT: Negative for sore throat, painful swallowing. Respiratory: Negative for cough, hemoptysis, SOB. Cardiology: Negative for chest pain, palpitations. GI:  Negative for constipation or diarrhea. : Negative for urinary frequency, dysuria, hematuria, nocturia. Skin: Negative for rash. Hematology: Positive for easy bruising. Negative for blood clots. Musculo-skelatal: Negative for back pain, muscle pain, weakness. Neurologic: Negative for headaches, dizziness, vertigo, memory problems not related to HE. Psychology: Negative for anxiety, depression.      FAMILY/SOCIAL HISTORY:  The patient is . The patient has 2 children. Stoped smoking 1/2016. History of heavy alcohol use. Last alcohol consumption was 1/2016. The patient retired in 2010. PHYSICAL EXAMINATION:  Visit Vitals  BP (!) 155/85 (BP 1 Location: Left upper arm, BP Patient Position: Sitting, BP Cuff Size: Small adult)   Pulse 62   Temp (!) 96.4 °F (35.8 °C)   Resp 17   Ht 5' 10\" (1.778 m)   Wt 197 lb (89.4 kg)   SpO2 98%   BMI 28.27 kg/m²       PHYSICAL EXAMINATION:  VS: per nursing note  General: No acute distress. Eyes: Sclera anicteric. ENT: No oral lesions. Thyroid normal.  Nodes: No adenopathy. Skin: No spider angiomata. No jaundice. No palmar erythema. Respiratory: Lungs clear to auscultation. Cardiovascular: Regular heart rate. No murmurs. No JVD. Abdomen: Soft non-tender, liver size normal to percussion/palpation. Spleen not palpable. No obvious ascites. Extremities: No edema. No muscle wasting. No gross arthritic changes. Neurologic: Alert and oriented. Cranial nerves grossly intact. No asterixis. LABORATORY:  [facility] [place] [facility] [address] Units 1/19/2021 7/15/2020   WBC 4.6 - 13.2 K/uL 3.0 (L) 2.6 (L)   ANC 1.8 - 8.0 K/UL 2.1 1.9   HGB 13.0 - 16.0 g/dL 13.1 12.0 (L)    - 420 K/uL 54 (L) 58 (L)   INR 0.8 - 1.2   1.2 1.2   AST 10 - 38 U/L 21 36   ALT 16 - 61 U/L 28 37   Alk Phos 45 - 117 U/L 108 109   Bili, Total 0.2 - 1.0 MG/DL 0.7 0.7   Bili, Direct 0.0 - 0.2 MG/DL 0.2 0.2   Albumin 3.4 - 5.0 g/dL 4.2 4.1   BUN 7.0 - 18 MG/DL 36 (H) 38 (H)   Creat 0.6 - 1.3 MG/DL 1.38 (H) 1.43 (H)   Creat (iSTAT) 0.6 - 1.3 MG/DL     Na 136 - 145 mmol/L 139 141   K 3.5 - 5.5 mmol/L 5.9 (H) 5.1   Cl 100 - 111 mmol/L 110 109   CO2 21 - 32 mmol/L 28 28   Glucose 74 - 99 mg/dL 67 (L) 86     Cancer Screening Latest Ref Rng & Units 7/15/2020 1/15/2020   AFP, Serum 0.0 - 8.0 ng/mL 3.4 4.4   AFP-L3% 0.0 - 9.9 % Comment Comment     SEROLOGY  2/2012.   Anti-HCV negative, CMV IgG positive, EBV IgG positive,     LIVER HISTOLOGY  12/2017. Liver mass biopsy. Hepatocytes show some mild steatosis. The lesion in this case is a hemangioma, and there are several fragments of it along with the liver tissue, characterized by a fibrous background and scattered simple, capillary-like vessels. The architecture is cavernous. There is no evidence of malignancy. 12/2018. TRANSIENT HEPATIC ELASTOGRAPHY:   E Range: 8.9-13.9 kPa  E Mean: 11.5 kPa  E Median: 11.6 kPa  E Std: 1.7 kPa    07/2020. TRANSIENT HEPATIC ELASTOGRAPHY:   E Range: 6.8-14.9 kPa  E Mean: 10.0 kPa  E Median: 8.9 kPa  E Std: 2.8 kPa    1/2022. FibroScan performed at [address] EkPa was 29.1. IQR/med 13%. . The results suggested a fibrosis level of F4. The CAP score suggests there is no significant hepatic steatosis. ENDOSCOPIC PROCEDURES  2/2017:  EGD with HALO per Dr. David Basilio. Repeat in 3 months. 05/2017. EGD by Dr. Meredith Staton. HALO for St's. Repeat in 3 months. 08/2017. EGD by Dr. Meredith Staton. HALO for St's. Repeat in 3 months. 02/2018. EGD by Dr. Meredith Staton. Esophagus: Few small 3-4 mm salmon colored islands were seen at the 38 cm. No nodularity was seen. Grade 1 varices were seen. Stomach: Streaky erythema was noted in the antrum radiating towards the pylorus, consistent with gastric antral vascular ectasia. Duodenum/jejunum:normal.  Variceal ablation (including the St's mucosa) was performed with 2 of bands placed. Repeat in one year. 04/2019. EGD by Dr. Meredith Staton. Irregular Z line was noted. No nodularity was seen. No [place] colored islands were noted. No varices were seen. Scarring from previous band ligation was noted. Repeat in one year. 12/2020. EGD by Dr. Mihir Blanc. No report available. Patient reports no banding. RADIOLOGY  11/2017. Abdominal MRI w/wo contrast.  Hepatic cirrhosis. Li-RADS 4 lesion in hepatic segment 3 (probable hepatocellular carcinoma). Faint arterial phase hyperenhancement throughout the left hepatic lobe. While infiltrative lesion not absolutely excluded, suspect finding reflects intralesional shunting related to the lesion. Scattered additional Li-RADS 3 hepatic lesions (indeterminate). 12/2017. Abdominal CT w/wo contrast. Complex hypervascular lesion in the periphery of segment 3 of the left hepatic lobe as described and measured above. Minimal interval increase in size. LI-RADS 4. Developing hepatoma is most likely. 06/2018. Ultrasound of the liver. Cirrhosis. Ovoid hyperechoic nodule in the right lobe, unchanged in the interval. Prior biopsy confirmed this as a hemangioma. 12/2018. Ultrasound of the liver. Coarsely heterogeneous echotexture with a nodular contour and prominent caudate lobe, in keeping with history of cirrhosis. Redemonstrated hyperechoic mass measuring 1.4 x 1.3 x 1.0 cm and the left hepatic lobe, in keeping with a hemangioma demonstrated on prior studies which was previously biopsied. 07/2019. Dynamic MRI of the liver. Slowly growing 2.7 cm mass without arterial enhancement, with washout in hepatic segment IVb. This mass measured up to 2.1 cm thousand 17 and 1.5 cm 2014. LIRADS 4. Fibrotic changes within the right liver have progressed since 2017. Hemangioma and associated shunting within the left lateral liver are unchanged since 2017.  12/2019. Dynamic MRI of the liver. Cirrhosis with no significant change in size or appearance of previously described T2 hypointense nodule in the right hepatic lobe. Given stability as well as current imaging features, this most likely represents a nodular area of liver parenchyma which is accentuated by the adjacent background of confluent fibrosis rather than a true mass. LR-2. Left hepatic lobe hemangioma and adjacent heterogeneous enhancement again identified, unchanged. LR-1. No new suspicious nodules or masses identified in the liver. 07/2020.   Ultrasound of the liver.  Cirrhosis. Left hepatic lobe hemangioma 1.4 cm. No suspicious focal hepatic mass. 02/2021. Ultrasound of the liver. Slightly coarsened increased echotexture. 1.0 x 1.1 cm hyperechoic nodule in the left hepatic lobe which corresponds with hemangioma described on prior MRI .  07/2021. Ultrasound of the liver. Stable appearance of liver cirrhosis  and presumed left hepatic lobe  hemangioma. Normal portal venous flow. 25 minutes total time spent with this patient with more than 50% of this time spent counseling and coordinating care as described above. [address] in 6 months.       MICHAEL Merrill  [facility] [place] [facility]  [address], [address] [facility], [address] [place] [address]   [phone]

## 2022-01-21 LAB
AFP L3 MFR SERPL: NORMAL % (ref 0–9.9)
AFP SERPL-MCNC: 4.4 NG/ML (ref 0–8)

## 2022-02-10 ENCOUNTER — HOSPITAL ENCOUNTER (OUTPATIENT)
Dept: ULTRASOUND IMAGING | Age: 67
Discharge: HOME | End: 2022-02-10
Payer: MEDICARE

## 2022-02-10 DIAGNOSIS — K74.60 CIRRHOSIS OF LIVER WITHOUT ASCITES, UNSPECIFIED HEPATIC CIRRHOSIS TYPE (HCC): ICD-10-CM

## 2022-02-10 PROCEDURE — 76705 ECHO EXAM OF ABDOMEN: CPT

## 2022-02-15 DIAGNOSIS — K74.60 CIRRHOSIS OF LIVER WITHOUT ASCITES, UNSPECIFIED HEPATIC CIRRHOSIS TYPE (HCC): Primary | ICD-10-CM

## 2022-02-22 ENCOUNTER — HOSPITAL ENCOUNTER (OUTPATIENT)
Dept: MRI IMAGING | Age: 67
Discharge: HOME | End: 2022-02-22
Payer: MEDICARE

## 2022-02-22 VITALS — BODY MASS INDEX: 28.55 KG/M2 | WEIGHT: 199 LBS

## 2022-02-22 DIAGNOSIS — K74.60 CIRRHOSIS OF LIVER WITHOUT ASCITES, UNSPECIFIED HEPATIC CIRRHOSIS TYPE (HCC): ICD-10-CM

## 2022-02-22 PROCEDURE — 74011250636 HC RX REV CODE- 250/636: Performed by: NURSE PRACTITIONER

## 2022-02-22 PROCEDURE — A9577 INJ MULTIHANCE: HCPCS | Performed by: NURSE PRACTITIONER

## 2022-02-22 PROCEDURE — 74183 MRI ABD W/O CNTR FLWD CNTR: CPT

## 2022-02-22 RX ADMIN — GADOBENATE DIMEGLUMINE 15 ML: 529 INJECTION, SOLUTION INTRAVENOUS at 14:12

## 2022-03-18 PROBLEM — Z86.19 HEPATITIS C VIRUS INFECTION CURED AFTER ANTIVIRAL DRUG THERAPY: Status: ACTIVE | Noted: 2021-01-19

## 2022-03-19 PROBLEM — E11.21 TYPE 2 DIABETES MELLITUS WITH NEPHROPATHY (HCC): Status: ACTIVE | Noted: 2018-01-30

## 2022-08-31 ENCOUNTER — HOSPITAL ENCOUNTER (OUTPATIENT)
Dept: LAB | Age: 67
Discharge: HOME | End: 2022-08-31
Payer: MEDICARE

## 2022-08-31 ENCOUNTER — OFFICE VISIT (OUTPATIENT)
Dept: HEMATOLOGY | Age: 67
End: 2022-08-31
Payer: MEDICARE

## 2022-08-31 VITALS
BODY MASS INDEX: 26.63 KG/M2 | WEIGHT: 186 LBS | OXYGEN SATURATION: 98 % | HEART RATE: 64 BPM | DIASTOLIC BLOOD PRESSURE: 61 MMHG | TEMPERATURE: 96.9 F | HEIGHT: 70 IN | SYSTOLIC BLOOD PRESSURE: 106 MMHG

## 2022-08-31 DIAGNOSIS — K74.60 CIRRHOSIS OF LIVER WITHOUT ASCITES, UNSPECIFIED HEPATIC CIRRHOSIS TYPE (HCC): ICD-10-CM

## 2022-08-31 DIAGNOSIS — K74.60 CIRRHOSIS OF LIVER WITHOUT ASCITES, UNSPECIFIED HEPATIC CIRRHOSIS TYPE (HCC): Primary | ICD-10-CM

## 2022-08-31 LAB
ALBUMIN SERPL-MCNC: 3.5 G/DL (ref 3.4–5)
ALBUMIN/GLOB SERPL: 1.1 {RATIO} (ref 0.8–1.7)
ALP SERPL-CCNC: 107 U/L (ref 45–117)
ALT SERPL-CCNC: 30 U/L (ref 16–61)
ANION GAP SERPL CALC-SCNC: 3 MMOL/L (ref 3–18)
AST SERPL-CCNC: 29 U/L (ref 10–38)
BASOPHILS # BLD: 0 K/UL (ref 0–0.1)
BASOPHILS NFR BLD: 0 % (ref 0–2)
BILIRUB DIRECT SERPL-MCNC: 0.2 MG/DL (ref 0–0.2)
BILIRUB SERPL-MCNC: 0.6 MG/DL (ref 0.2–1)
BUN SERPL-MCNC: 32 MG/DL (ref 7–18)
BUN/CREAT SERPL: 19 (ref 12–20)
CALCIUM SERPL-MCNC: 9.2 MG/DL (ref 8.5–10.1)
CHLORIDE SERPL-SCNC: 112 MMOL/L (ref 100–111)
CO2 SERPL-SCNC: 26 MMOL/L (ref 21–32)
CREAT SERPL-MCNC: 1.72 MG/DL (ref 0.6–1.3)
DIFFERENTIAL METHOD BLD: ABNORMAL
EOSINOPHIL # BLD: 0.2 K/UL (ref 0–0.4)
EOSINOPHIL NFR BLD: 7 % (ref 0–5)
ERYTHROCYTE [DISTWIDTH] IN BLOOD BY AUTOMATED COUNT: 14.1 % (ref 11.6–14.5)
GLOBULIN SER CALC-MCNC: 3.2 G/DL (ref 2–4)
GLUCOSE SERPL-MCNC: 109 MG/DL (ref 74–99)
HCT VFR BLD AUTO: 36.1 % (ref 36–48)
HGB BLD-MCNC: 11.7 G/DL (ref 13–16)
IMM GRANULOCYTES # BLD AUTO: 0 K/UL (ref 0–0.04)
IMM GRANULOCYTES NFR BLD AUTO: 0 % (ref 0–0.5)
INR PPP: 1.3 (ref 0.8–1.2)
LYMPHOCYTES # BLD: 0.3 K/UL (ref 0.9–3.6)
LYMPHOCYTES NFR BLD: 12 % (ref 21–52)
MCH RBC QN AUTO: 27.4 PG (ref 24–34)
MCHC RBC AUTO-ENTMCNC: 32.4 G/DL (ref 31–37)
MCV RBC AUTO: 84.5 FL (ref 78–100)
MONOCYTES # BLD: 0.2 K/UL (ref 0.05–1.2)
MONOCYTES NFR BLD: 7 % (ref 3–10)
NEUTS SEG # BLD: 1.9 K/UL (ref 1.8–8)
NEUTS SEG NFR BLD: 73 % (ref 40–73)
NRBC # BLD: 0 K/UL (ref 0–0.01)
NRBC BLD-RTO: 0 PER 100 WBC
PLATELET # BLD AUTO: 65 K/UL (ref 135–420)
PMV BLD AUTO: 12.4 FL (ref 9.2–11.8)
POTASSIUM SERPL-SCNC: 4.3 MMOL/L (ref 3.5–5.5)
PROT SERPL-MCNC: 6.7 G/DL (ref 6.4–8.2)
PROTHROMBIN TIME: 16.1 SEC (ref 11.5–15.2)
RBC # BLD AUTO: 4.27 M/UL (ref 4.35–5.65)
SODIUM SERPL-SCNC: 141 MMOL/L (ref 136–145)
WBC # BLD AUTO: 2.5 K/UL (ref 4.6–13.2)

## 2022-08-31 PROCEDURE — G8417 CALC BMI ABV UP PARAM F/U: HCPCS | Performed by: NURSE PRACTITIONER

## 2022-08-31 PROCEDURE — G9711 PT HX TOT COL OR COLON CA: HCPCS | Performed by: NURSE PRACTITIONER

## 2022-08-31 PROCEDURE — G0463 HOSPITAL OUTPT CLINIC VISIT: HCPCS | Performed by: NURSE PRACTITIONER

## 2022-08-31 PROCEDURE — 85610 PROTHROMBIN TIME: CPT

## 2022-08-31 PROCEDURE — G8536 NO DOC ELDER MAL SCRN: HCPCS | Performed by: NURSE PRACTITIONER

## 2022-08-31 PROCEDURE — 1101F PT FALLS ASSESS-DOCD LE1/YR: CPT | Performed by: NURSE PRACTITIONER

## 2022-08-31 PROCEDURE — 99214 OFFICE O/P EST MOD 30 MIN: CPT | Performed by: NURSE PRACTITIONER

## 2022-08-31 PROCEDURE — 80048 BASIC METABOLIC PNL TOTAL CA: CPT

## 2022-08-31 PROCEDURE — G8432 DEP SCR NOT DOC, RNG: HCPCS | Performed by: NURSE PRACTITIONER

## 2022-08-31 PROCEDURE — 85025 COMPLETE CBC W/AUTO DIFF WBC: CPT

## 2022-08-31 PROCEDURE — 82107 ALPHA-FETOPROTEIN L3: CPT

## 2022-08-31 PROCEDURE — 1123F ACP DISCUSS/DSCN MKR DOCD: CPT | Performed by: NURSE PRACTITIONER

## 2022-08-31 PROCEDURE — G8752 SYS BP LESS 140: HCPCS | Performed by: NURSE PRACTITIONER

## 2022-08-31 PROCEDURE — G8427 DOCREV CUR MEDS BY ELIG CLIN: HCPCS | Performed by: NURSE PRACTITIONER

## 2022-08-31 PROCEDURE — 36415 COLL VENOUS BLD VENIPUNCTURE: CPT

## 2022-08-31 PROCEDURE — 80076 HEPATIC FUNCTION PANEL: CPT

## 2022-08-31 PROCEDURE — G8754 DIAS BP LESS 90: HCPCS | Performed by: NURSE PRACTITIONER

## 2022-08-31 RX ORDER — ACYCLOVIR 50 MG/G
CREAM TOPICAL
COMMUNITY

## 2022-08-31 RX ORDER — DICLOFENAC SODIUM 10 MG/G
GEL TOPICAL
COMMUNITY

## 2022-08-31 NOTE — PROGRESS NOTES
[facility], [place], [address], [facility], [place]      DONITA Sheldon, [facility]     April S Rigo, [facility]   Andrea Pierre, FNP-C    Randolph Durbin, [facility]       Mark Bishoputado Her[address]    at [address], [address] [address]rera  22. [phone]    FAX: [address] [place] [address]    at [address], [address]    [phone]    FAX: [phone]       Patient Care Team:  Dhiraj Melendez MD as PCP - General (Family Medicine)  Darya Sheridan MD (Internal Medicine Physician)  Jesica Avila MD (Gastroenterology)  Mary Fournier MD (Nephrology)      Problem List  Date Reviewed: 7/19/2021            Codes Class Noted    Hepatitis C virus infection cured after antiviral drug therapy ICD-10-CM: Z86.19  ICD-9-CM: V12.09  1/19/2021        Type 2 diabetes mellitus with nephropathy [facility]) ICD-10-CM: E11.21  ICD-9-CM: 250.40, 583.81  1/30/2018        H/O alcohol abuse ICD-10-CM: F10.11  ICD-9-CM: 305.03  Unknown    Overview Signed 4/27/2016  6:37 PM by Sahil Mcghee     quit few months ago, particpates in [place] [address]             H/O tobacco use, presenting hazards to health ICD-10-CM: Z87.891  ICD-9-CM: V15.82  Unknown    Overview Signed 4/27/2016  6:37 PM by Sahil Mcghee     quit few months ago             CAD (coronary artery disease), native coronary artery ICD-10-CM: I25.10  ICD-9-CM: 414.01  Unknown    Overview Addendum 1/3/2014  3:55 PM by Sahil Polanco     p/mLAD 60% ((FFR 0.84). Otherwise mild coronary artery disease (march 2013).  LV EF 65% (echo feb 2013)             CKD (chronic kidney disease) ICD-10-CM: N18.9  ICD-9-CM: 585.9  2/26/2013        Esophageal varices ([facility] ICD-10-CM: I85.00  ICD-9-CM: 456.1  2/26/2013        Cirrhosis ([facility] ICD-10-CM: K74.60  ICD-9-CM: 571.5  2/2/2013    Overview Signed 2/2/2013  5:22 PM by Sarah Aguirre MD     Secondary to chronic HCV             Ascites ICD-10-CM: R18.8  ICD-9-CM: 789.59  2/2/2013        Hypertension ICD-10-CM: I10  ICD-9-CM: 401.9  5/23/2011        Ventral hernia, unspecified, without mention of obstruction or gangrene ICD-10-CM: K43.9  ICD-9-CM: 553.20  5/23/2011        Hyperlipidemia ICD-10-CM: E78.5  ICD-9-CM: 272.4  5/23/2011        Type II or unspecified type diabetes mellitus without mention of complication, not stated as uncontrolled ICD-10-CM: E11.9  ICD-9-CM: 250.00  5/23/2011        GERD (gastroesophageal reflux disease) ICD-10-CM: K21.9  ICD-9-CM: 530.81  5/23/2011        St's esophagus ICD-10-CM: K22.70  ICD-9-CM: 530.85  5/23/2011        Thrombocytopenia, secondary to cirrhosis ICD-10-CM: D69.59  ICD-9-CM: 287.49  5/23/2011           Naomi Nunez returns [facility] the [facility] for monitoring of cirrhosis from chronic HCV. The HCV has been treated and cured. The active problem list, all pertinent past medical history, medications, liver histology, endoscopic studies, radiologic findings and laboratory findings related to the liver disorder were reviewed with the patient. Ascites resolved. The patient has not developed hepatic encephalopathy. The patient has esophageal varices without bleeding. The patient has began, but has not completed liver transplant evaluation testing. The Thallium stress test demonstrated a fixed and 2 small reversible lesions. Cardiac catheterization did not indicate the need for stenting. Mr. Rhona Keith was noted to have a nodule on his thyroid while being worked up for a pleural effusion. He had a biopsy that was negative for malignancy. The patient had a LI-RADS 4 lesions from abdominal MRI performed in 11/2017. This was worked up and biopsied.   Biopsy demonstrated that this lesion is a hemangioma. We have discussed the liver transplant process and various centers he could go to for liver transplantation. He would like to be seen at [place], [place] if this becomes neccessary. Mr. Ariel Lucia completed 12 weeks of treatment with sofosbuvir and simeprevir in June 2014. He had a SVR two years post treatment. Mr. Angie Cordon denies any further alcohol use since January 2016. He states that he continues to participate in [place]. Dr. Bulmaro Mcneill has performed HALO procedures for St esophagus. Dr. Bulmaro Mcneill has released the patient. Mr. Ariel Lucia is overall feeling well since his last office visit. His lower extremity edema has completely resolved and he is currently not taking any diuretics. He deneis any shortness of breath. The patient has no complaints which can be attributed to liver disease. The patient has not experienced problems concentrating, swelling of the abdomen, swelling of the lower extremities, hematemesis, hematochezia. Since the last office appointment the patient has:  Had no changes in the liver disease. Continues in [place]. 6+ year sobriety. 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 The most recent laboratory studies indicate that the liver transaminases are normal, alkaline phosphatase is elevated, tests of hepatic synthetic and metabolic function are normal, and the platelet count is depressed. Will perform laboratory testing to monitor liver function and degree of liver injury. This will include hepatic panel, a CBC w/ diff, a BMP, a PT/INR, and an AFP-L3%. Complications of cirrhosis were discussed in detail. We discussed thrombocytopenia, portal hypertension, varices, GI bleeding, peripheral edema, ascites, hepatic encephalopathy, and hepatocellular carcinoma. We discussed the need for follow ups on a regular basis to monitor for complications.  We discussed the need for every 6 month liver imaging studies. Fibroscan analysis performed in 12/2022 indicates cirrhosis. There is no evidence of fatty liver disease. Will repeat the fibroscan when the patient returns for follow up in one year. Chronic HCV, genotype 1. He completed twelve weeks of treatment with simeprevir and sofosbuvir in June 2014. He had a SVR 2 years post treatment. Peripheral edema. Resolved. Esophageal varicies without prior bleeding. He has had repeat EGDs with HALO procedure performed. No varices identified. Most recent EGD was performed by Dr. Cj Taylor. Patient reports that he did no have any esophageal varices. Dr. Cj Taylor has retired. EGD ordered today to be performed  by Dr. Nohemi Mejia. Hepatic encephalopathy has not developed to date. There is no need for treatment with lactulose and/or xifaxan at this time. ETOH abuse. He has not consumed any alcohol since 01/2016. Continue AA. The patient had a postive thalium stress test with reversible ischemia. He has a cardiac catheterization. No significant ASCAD identified. Thrombocytopenia secondary to cirrhosis. There is no evidence of overt bleeding. There is no indication for platelet transfusions or pharmacologic treatment to increase the platelet count. [address] screening. Imaging suggested that Mr. Meenakshi Hall had developed [address] He was referred to Dr. Nona Farias for evaluation. The LI-RADS 4 lesion was biopsied and determined to be a hemangioma. The plan was to continue with advanced imaging, either MRI or CT, every 6 months moving forward. Lesion now classified as LI-RADS 2. We will therefore perform ultrasound alternating with advanced imaging at 6 month intervals. These, along with AFP's every 6 months, should suffice for [address] screenings. Ultrasound was ordered today to be performed in 09/2022. All of the above issues were discussed with the patient. All questions were answered.   The patient expressed a clear understanding of the above. ALLERGIES:  No Known Allergies    Current Outpatient Medications   Medication Sig    amLODIPine (NORVASC) 10 mg tablet Take 1 tablet by mouth daily    hydroCHLOROthiazide (HYDRODIURIL) 25 mg tablet     alogliptin (NESINA) 25 mg tablet Take 25 mg by mouth daily. nitroglycerin (NITROSTAT) 0.4 mg SL tablet 1 Tab by SubLINGual route every five (5) minutes as needed for Chest Pain. Up to 3 doses. anastrozole (ARIMIDEX) 1 mg tablet Take 1 mg by mouth every seven (7) days. testosterone 30 mg/actuation (1.5 mL) slpm by TransDERmal route. terazosin (HYTRIN) 5 mg capsule Take 5 mg by mouth daily. atorvastatin (LIPITOR) 10 mg tablet Take 20 mg by mouth daily. metoprolol succinate (TOPROL-XL) 50 mg XL tablet Take 50 mg by mouth daily. Indications: HYPERTENSION    glipiZIDE SR (GLUCOTROL) 10 mg CR tablet Take 10 mg by mouth daily. Indications: type 2 diabetes mellitus    OMEPRAZOLE (PRILOSEC PO) Take 20 mg by mouth daily. Indications: GERD     No current facility-administered medications for this visit. SYSTEM REVIEW NOT RELATED TO LIVER DISEASE OR REVIEWED ABOVE:  Constitution systems: Negative for fever, chills, weight gain, weight loss. Eyes: Negative for visual changes. ENT: Negative for sore throat, painful swallowing. Respiratory: Negative for cough, hemoptysis, SOB. Cardiology: Negative for chest pain, palpitations. GI:  Negative for constipation or diarrhea. : Negative for urinary frequency, dysuria, hematuria, nocturia. Skin: Negative for rash. Hematology: Positive for easy bruising. Negative for blood clots. Musculo-skelatal: Negative for back pain, muscle pain, weakness. Neurologic: Negative for headaches, dizziness, vertigo, memory problems not related to HE. Psychology: Negative for anxiety, depression. FAMILY/SOCIAL HISTORY:  The patient is . The patient has 2 children. Stoped smoking 1/2016.   History of heavy alcohol use. Last alcohol consumption was 1/2016. The patient retired in 2010. PHYSICAL EXAMINATION:  Visit Vitals  BP (!) 155/85 (BP 1 Location: Left upper arm, BP Patient Position: Sitting, BP Cuff Size: Small adult)   Pulse 62   Temp (!) 96.4 °F (35.8 °C)   Resp 17   Ht 5' 10\" (1.778 m)   Wt 197 lb (89.4 kg)   SpO2 98%   BMI 28.27 kg/m²       PHYSICAL EXAMINATION:  VS: per nursing note  General: No acute distress. Eyes: Sclera anicteric. ENT: No oral lesions. Thyroid normal.  Nodes: No adenopathy. Skin: No spider angiomata. No jaundice. No palmar erythema. Respiratory: Lungs clear to auscultation. Cardiovascular: Regular heart rate. No murmurs. No JVD. Abdomen: Soft non-tender, liver size normal to percussion/palpation. Spleen not palpable. No obvious ascites. Extremities: No edema. No muscle wasting. No gross arthritic changes. Neurologic: Alert and oriented. Cranial nerves grossly intact. No asterixis. LABORATORY:  [facility] [place] [address] Units 1/19/2021 7/15/2020   WBC 4.6 - 13.2 K/uL 3.0 (L) 2.6 (L)   ANC 1.8 - 8.0 K/UL 2.1 1.9   HGB 13.0 - 16.0 g/dL 13.1 12.0 (L)    - 420 K/uL 54 (L) 58 (L)   INR 0.8 - 1.2   1.2 1.2   AST 10 - 38 U/L 21 36   ALT 16 - 61 U/L 28 37   Alk Phos 45 - 117 U/L 108 109   Bili, Total 0.2 - 1.0 MG/DL 0.7 0.7   Bili, Direct 0.0 - 0.2 MG/DL 0.2 0.2   Albumin 3.4 - 5.0 g/dL 4.2 4.1   BUN 7.0 - 18 MG/DL 36 (H) 38 (H)   Creat 0.6 - 1.3 MG/DL 1.38 (H) 1.43 (H)   Creat (iSTAT) 0.6 - 1.3 MG/DL     Na 136 - 145 mmol/L 139 141   K 3.5 - 5.5 mmol/L 5.9 (H) 5.1   Cl 100 - 111 mmol/L 110 109   CO2 21 - 32 mmol/L 28 28   Glucose 74 - 99 mg/dL 67 (L) 86     Cancer Screening Latest Ref Rng & Units 1/19/2022 7/19/2021 1/19/2021   AFP, Serum 0.0 - 8.0 ng/mL 4.4 4.6 3.9   AFP-L3% 0.0 - 9.9 % Comment Comment Comment     SEROLOGY  2/2012. Anti-HCV negative, CMV IgG positive, EBV IgG positive,     LIVER HISTOLOGY  12/2017.   Liver mass biopsy. Hepatocytes show some mild steatosis. The lesion in this case is a hemangioma, and there are several fragments of it along with the liver tissue, characterized by a fibrous background and scattered simple, capillary-like vessels. The architecture is cavernous. There is no evidence of malignancy. 12/2018. TRANSIENT HEPATIC ELASTOGRAPHY:   E Range: 8.9-13.9 kPa  E Mean: 11.5 kPa  E Median: 11.6 kPa  E Std: 1.7 kPa    07/2020. TRANSIENT HEPATIC ELASTOGRAPHY:   E Range: 6.8-14.9 kPa  E Mean: 10.0 kPa  E Median: 8.9 kPa  E Std: 2.8 kPa    1/2022. FibroScan performed at [address] EkPa was 29.1. IQR/med 13%. . The results suggested a fibrosis level of F4. The CAP score suggests there is no significant hepatic steatosis. ENDOSCOPIC PROCEDURES  2/2017:  EGD with HALO per Dr. Arielle Hu. Repeat in 3 months. 05/2017. EGD by Dr. Mellie Felty. HALO for St's. Repeat in 3 months. 08/2017. EGD by Dr. Mellie Felty. HALO for St's. Repeat in 3 months. 02/2018. EGD by Dr. Mellie Felty. Esophagus: Few small 3-4 mm salmon colored islands were seen at the 38 cm. No nodularity was seen. Grade 1 varices were seen. Stomach: Streaky erythema was noted in the antrum radiating towards the pylorus, consistent with gastric antral vascular ectasia. Duodenum/jejunum:normal.  Variceal ablation (including the St's mucosa) was performed with 2 of bands placed. Repeat in one year. 04/2019. EGD by Dr. Mellie Felty. Irregular Z line was noted. No nodularity was seen. No [place] colored islands were noted. No varices were seen. Scarring from previous band ligation was noted. Repeat in one year. 12/2020. EGD by Dr. Ernie Ronquillo. No report available. Patient reports no banding. RADIOLOGY  11/2017. Abdominal MRI w/wo contrast.  Hepatic cirrhosis. Li-RADS 4 lesion in hepatic segment 3 (probable hepatocellular carcinoma).   Faint arterial phase hyperenhancement throughout the left hepatic lobe. While infiltrative lesion not absolutely excluded, suspect finding reflects intralesional shunting related to the lesion. Scattered additional Li-RADS 3 hepatic lesions (indeterminate). 12/2017. Abdominal CT w/wo contrast. Complex hypervascular lesion in the periphery of segment 3 of the left hepatic lobe as described and measured above. Minimal interval increase in size. LI-RADS 4. Developing hepatoma is most likely. 06/2018. Ultrasound of the liver. Cirrhosis. Ovoid hyperechoic nodule in the right lobe, unchanged in the interval. Prior biopsy confirmed this as a hemangioma. 12/2018. Ultrasound of the liver. Coarsely heterogeneous echotexture with a nodular contour and prominent caudate lobe, in keeping with history of cirrhosis. Redemonstrated hyperechoic mass measuring 1.4 x 1.3 x 1.0 cm and the left hepatic lobe, in keeping with a hemangioma demonstrated on prior studies which was previously biopsied. 07/2019. Dynamic MRI of the liver. Slowly growing 2.7 cm mass without arterial enhancement, with washout in hepatic segment IVb. This mass measured up to 2.1 cm thousand 17 and 1.5 cm 2014. LIRADS 4. Fibrotic changes within the right liver have progressed since 2017. Hemangioma and associated shunting within the left lateral liver are unchanged since 2017.  12/2019. Dynamic MRI of the liver. Cirrhosis with no significant change in size or appearance of previously described T2 hypointense nodule in the right hepatic lobe. Given stability as well as current imaging features, this most likely represents a nodular area of liver parenchyma which is accentuated by the adjacent background of confluent fibrosis rather than a true mass. LR-2. Left hepatic lobe hemangioma and adjacent heterogeneous enhancement again identified, unchanged. LR-1. No new suspicious nodules or masses identified in the liver. 07/2020. Ultrasound of the liver. Cirrhosis. Left hepatic lobe hemangioma 1.4 cm.  No suspicious focal hepatic mass. 02/2021. Ultrasound of the liver. Slightly coarsened increased echotexture. 1.0 x 1.1 cm hyperechoic nodule in the left hepatic lobe which corresponds with hemangioma described on prior MRI .  07/2021. Ultrasound of the liver. Stable appearance of liver cirrhosis  and presumed left hepatic lobe  hemangioma. Normal portal venous flow. 02/2022. Ultrasound of the liver. Grossly similar findings of hepatic cirrhosis. Interval increase in size and  hyperechoic hepatic nodule measuring 3.3 cm in greatest dimension on today's exam.  02/2022. Dynamic MRI of the liver. Cirrhosis with sequela of portal hypertension including splenomegaly.  -Observation #1 in the left liver appears similar to prior study, possible sclerosed hemangioma: LR-1.  -Observation #2 in the right liver has minimally increased since prior, likely lobular parenchyma: LR-2.  -No new suspicious liver mass. 25 minutes total time spent with this patient with more than 50% of this time spent counseling and coordinating care as described above. [address] in 6 months for fibroscan and continued monitoring.         MICHAEL Benavides  [facility] [place] [address], [address] [place] [address] Otilia Valles, [address] [facility]   [phone]

## 2022-09-02 LAB
AFP L3 MFR SERPL: NORMAL % (ref 0–9.9)
AFP SERPL-MCNC: 3.8 NG/ML (ref 0–8.4)

## 2022-09-14 ENCOUNTER — HOSPITAL ENCOUNTER (OUTPATIENT)
Dept: ULTRASOUND IMAGING | Age: 67
Discharge: HOME | End: 2022-09-14
Payer: MEDICARE

## 2022-09-14 DIAGNOSIS — K74.60 CIRRHOSIS OF LIVER WITHOUT ASCITES, UNSPECIFIED HEPATIC CIRRHOSIS TYPE (HCC): ICD-10-CM

## 2022-09-14 PROCEDURE — 76705 ECHO EXAM OF ABDOMEN: CPT

## 2022-09-22 DIAGNOSIS — K74.60 CIRRHOSIS OF LIVER WITHOUT ASCITES, UNSPECIFIED HEPATIC CIRRHOSIS TYPE (HCC): Primary | ICD-10-CM

## 2022-10-12 ENCOUNTER — HOSPITAL ENCOUNTER (OUTPATIENT)
Dept: MRI IMAGING | Age: 67
Discharge: HOME | End: 2022-10-12
Payer: MEDICARE

## 2022-10-12 DIAGNOSIS — K74.60 CIRRHOSIS OF LIVER WITHOUT ASCITES, UNSPECIFIED HEPATIC CIRRHOSIS TYPE (HCC): ICD-10-CM

## 2022-10-12 LAB — CREAT UR-MCNC: 1.7 MG/DL (ref 0.6–1.3)

## 2022-10-12 PROCEDURE — 74011250636 HC RX REV CODE- 250/636

## 2022-10-12 PROCEDURE — 82565 ASSAY OF CREATININE: CPT

## 2022-10-12 PROCEDURE — A9577 INJ MULTIHANCE: HCPCS

## 2022-10-12 PROCEDURE — 74183 MRI ABD W/O CNTR FLWD CNTR: CPT

## 2022-10-12 RX ADMIN — GADOBENATE DIMEGLUMINE 16 ML: 529 INJECTION, SOLUTION INTRAVENOUS at 09:20

## 2022-10-17 ENCOUNTER — HOSPITAL ENCOUNTER (OUTPATIENT)
Age: 67
Setting detail: OUTPATIENT SURGERY
Discharge: HOME | End: 2022-10-17
Attending: INTERNAL MEDICINE | Admitting: INTERNAL MEDICINE
Payer: MEDICARE

## 2022-10-17 ENCOUNTER — ANESTHESIA (OUTPATIENT)
Dept: ENDOSCOPY | Age: 67
End: 2022-10-17
Payer: MEDICARE

## 2022-10-17 ENCOUNTER — ANESTHESIA EVENT (OUTPATIENT)
Dept: ENDOSCOPY | Age: 67
End: 2022-10-17
Payer: MEDICARE

## 2022-10-17 VITALS
RESPIRATION RATE: 16 BRPM | HEIGHT: 70 IN | HEART RATE: 54 BPM | BODY MASS INDEX: 27.16 KG/M2 | SYSTOLIC BLOOD PRESSURE: 121 MMHG | TEMPERATURE: 97.2 F | WEIGHT: 189.7 LBS | DIASTOLIC BLOOD PRESSURE: 65 MMHG | OXYGEN SATURATION: 100 %

## 2022-10-17 DIAGNOSIS — K31.89 PORTAL HYPERTENSIVE GASTROPATHY (HCC): Primary | ICD-10-CM

## 2022-10-17 DIAGNOSIS — K76.6 PORTAL HYPERTENSIVE GASTROPATHY (HCC): Primary | ICD-10-CM

## 2022-10-17 LAB — GLUCOSE BLD STRIP.AUTO-MCNC: 107 MG/DL (ref 70–110)

## 2022-10-17 PROCEDURE — 2709999900 HC NON-CHARGEABLE SUPPLY: Performed by: INTERNAL MEDICINE

## 2022-10-17 PROCEDURE — 82962 GLUCOSE BLOOD TEST: CPT

## 2022-10-17 PROCEDURE — 76040000019: Performed by: INTERNAL MEDICINE

## 2022-10-17 PROCEDURE — 74011250636 HC RX REV CODE- 250/636: Performed by: INTERNAL MEDICINE

## 2022-10-17 PROCEDURE — 43235 EGD DIAGNOSTIC BRUSH WASH: CPT | Performed by: INTERNAL MEDICINE

## 2022-10-17 PROCEDURE — 76060000031 HC ANESTHESIA FIRST 0.5 HR: Performed by: INTERNAL MEDICINE

## 2022-10-17 PROCEDURE — 74011000250 HC RX REV CODE- 250: Performed by: NURSE ANESTHETIST, CERTIFIED REGISTERED

## 2022-10-17 PROCEDURE — 74011250636 HC RX REV CODE- 250/636: Performed by: NURSE ANESTHETIST, CERTIFIED REGISTERED

## 2022-10-17 RX ORDER — SODIUM CHLORIDE 0.9 % (FLUSH) 0.9 %
5-40 SYRINGE (ML) INJECTION EVERY 8 HOURS
Status: CANCELLED | OUTPATIENT
Start: 2022-10-17

## 2022-10-17 RX ORDER — DEXMEDETOMIDINE HYDROCHLORIDE 100 UG/ML
INJECTION, SOLUTION INTRAVENOUS AS NEEDED
Status: DISCONTINUED | OUTPATIENT
Start: 2022-10-17 | End: 2022-10-17 | Stop reason: HOSPADM

## 2022-10-17 RX ORDER — SODIUM CHLORIDE 0.9 % (FLUSH) 0.9 %
5-40 SYRINGE (ML) INJECTION AS NEEDED
Status: CANCELLED | OUTPATIENT
Start: 2022-10-17

## 2022-10-17 RX ORDER — NALOXONE HYDROCHLORIDE 0.4 MG/ML
0.4 INJECTION, SOLUTION INTRAMUSCULAR; INTRAVENOUS; SUBCUTANEOUS
Status: CANCELLED | OUTPATIENT
Start: 2022-10-17 | End: 2022-10-17

## 2022-10-17 RX ORDER — FLUMAZENIL 0.1 MG/ML
0.2 INJECTION INTRAVENOUS
Status: CANCELLED | OUTPATIENT
Start: 2022-10-17 | End: 2022-10-17

## 2022-10-17 RX ORDER — ATORVASTATIN CALCIUM 20 MG/1
20 TABLET, FILM COATED ORAL DAILY
COMMUNITY
Start: 2022-07-18

## 2022-10-17 RX ORDER — SODIUM CHLORIDE 9 MG/ML
100 INJECTION, SOLUTION INTRAVENOUS CONTINUOUS
Status: DISCONTINUED | OUTPATIENT
Start: 2022-10-17 | End: 2022-10-17 | Stop reason: HOSPADM

## 2022-10-17 RX ORDER — ATROPINE SULFATE 0.1 MG/ML
0.5 INJECTION INTRAVENOUS
Status: CANCELLED | OUTPATIENT
Start: 2022-10-17 | End: 2022-10-18

## 2022-10-17 RX ORDER — DEXTROMETHORPHAN/PSEUDOEPHED 2.5-7.5/.8
1.2 DROPS ORAL
Status: CANCELLED | OUTPATIENT
Start: 2022-10-17

## 2022-10-17 RX ORDER — PROPOFOL 10 MG/ML
INJECTION, EMULSION INTRAVENOUS AS NEEDED
Status: DISCONTINUED | OUTPATIENT
Start: 2022-10-17 | End: 2022-10-17 | Stop reason: HOSPADM

## 2022-10-17 RX ORDER — EPINEPHRINE 0.1 MG/ML
1 INJECTION INTRACARDIAC; INTRAVENOUS
Status: CANCELLED | OUTPATIENT
Start: 2022-10-17 | End: 2022-10-18

## 2022-10-17 RX ORDER — LIDOCAINE HYDROCHLORIDE 20 MG/ML
INJECTION, SOLUTION EPIDURAL; INFILTRATION; INTRACAUDAL; PERINEURAL AS NEEDED
Status: DISCONTINUED | OUTPATIENT
Start: 2022-10-17 | End: 2022-10-17 | Stop reason: HOSPADM

## 2022-10-17 RX ADMIN — PROPOFOL 50 MG: 10 INJECTION, EMULSION INTRAVENOUS at 09:48

## 2022-10-17 RX ADMIN — PROPOFOL 50 MG: 10 INJECTION, EMULSION INTRAVENOUS at 09:50

## 2022-10-17 RX ADMIN — SODIUM CHLORIDE 100 ML/HR: 9 INJECTION, SOLUTION INTRAVENOUS at 09:27

## 2022-10-17 RX ADMIN — PROPOFOL 30 MG: 10 INJECTION, EMULSION INTRAVENOUS at 09:52

## 2022-10-17 RX ADMIN — DEXMEDETOMIDINE HYDROCHLORIDE 6 MCG: 100 INJECTION, SOLUTION INTRAVENOUS at 09:48

## 2022-10-17 RX ADMIN — LIDOCAINE HYDROCHLORIDE 80 MG: 20 INJECTION, SOLUTION INTRAVENOUS at 09:48

## 2022-10-17 NOTE — DISCHARGE INSTRUCTIONS
[address] [facility], MD, FACP, [address] [place], [place]      DONITA Ricks, [facility]   Skyla Alejandro, [facility]   Libby Joseph, MICHAEL Atkinson FNEFFIE Cartwright, [facility]       Mark Ednatiburcio [address]    at [address], [place] Sher Cabrera  22.    [phone]    FAX: [address] [place] [address], [address]    [phone]    FAX: [phone]         ENDOSCOPY DISCHARGE INSTRUCTIONS      Juaquin Waterman  1955  Date: 10/17/2022    DISCOMFORT:  Use lozenges or warm salt water gargle for sore thoat  Apply warm compress to IV site if red. If redness or soreness persists call the office. You may experience gas and bloating. Walking and belching will help relieve this. You may experience chest discomfort or pressure especially if banding of esophageal varices was performed. DIET:  You may resume your normal diet. ACTIVITY:  Spend the remainder of the day resting. Avoid any strenuous activity. You may not operate a vehicle for 12 hours. You may not engage in an occupation involving machinery or appliances for rest of today. Avoid making any critical or financial decisions for at least 24 hour. Call the [address] [facility] if you have any of the following:  Increasing chest or abdominal pain, nausea, vomiting, vomiting blood, abdominal distension or swelling, fever or chills, bloody discharge from nose or mouth or shortness of breath. Follow-up Instructions:  Call Dr. Rowdy Durán for any questions or problems at the phone number listed above. If a biopsy was performed, you will be contacted by the office staff or Dr Rowdy Durán within 1 week.    If you have not heard from us by then you may call the office at the phone number listed above to inquire about the results. ENDOSCOPY FINDINGS:  Your endoscopy demonstrated mild swelling of the stomach. This is due to cirrhosis  Will repeat EGD to look for development of varices in 3 years. Keep follow-up appointment with Vishal Jackson on 3/7/2023. DISCHARGE SUMMARY from the Nurse: The following personal items collected during your admission are returned to you:   Dental Appliance:    Vision: Visual Aid: Glasses, At home  Hearing Aid:    Jewelry:    Clothing:    Other Valuables:    Valuables sent to safe:      Learning About Coronavirus [phone])  Coronavirus [phone]): Overview  What is coronavirus ([id number])? The coronavirus disease (COVID-19) is caused by a virus. It is an illness that was first found in [place], [place], in December 2019. It has since spread worldwide. The virus can cause fever, cough, and trouble breathing. In severe cases, it can cause pneumonia and make it hard to breathe without help. It can cause death. Coronaviruses are a large group of viruses. They cause the common cold. They also cause more serious illnesses like Middle East respiratory syndrome (MERS) and severe acute respiratory syndrome (SARS). COVID-19 is caused by a novel coronavirus. That means it's a new type that has not been seen in people before. This virus spreads person-to-person through droplets from coughing and sneezing. It can also spread when you are close to someone who is infected. And it can spread when you touch something that has the virus on it, such as a doorknob or a tabletop. What can you do to protect yourself from coronavirus (COVID-19)? The best way to protect yourself from getting sick is to: Avoid areas where there is an outbreak. Avoid contact with people who may be infected. Wash your hands often with soap or alcohol-based hand sanitizers. Avoid crowds and try to stay at least 6 feet away from other people.   Wash your hands often, especially after you cough or sneeze. Use soap and water, and scrub for at least 20 seconds. If soap and water aren't available, use an alcohol-based hand . Avoid touching your mouth, nose, and eyes. What can you do to avoid spreading the virus to others? To help avoid spreading the virus to others:  Cover your mouth with a tissue when you cough or sneeze. Then throw the tissue in the trash. Use a disinfectant to clean things that you touch often. Stay home if you are sick or have been exposed to the virus. Don't go to school, work, or public areas. And don't use public transportation. If you are sick:  Leave your home only if you need to get medical care. But call the doctor's office first so they know you're coming. And wear a face mask, if you have one. If you have a face mask, wear it whenever you're around other people. It can help stop the spread of the virus when you cough or sneeze. Clean and disinfect your home every day. Use household  and disinfectant wipes or sprays. Take special care to clean things that you grab with your hands. These include doorknobs, remote controls, phones, and handles on your refrigerator and microwave. And don't forget countertops, tabletops, bathrooms, and computer keyboards. When to call for help  Call 911 anytime you think you may need emergency care. For example, call if:  You have severe trouble breathing. (You can't talk at all.)  You have constant chest pain or pressure. You are severely dizzy or lightheaded. You are confused or can't think clearly. Your face and lips have a blue color. You pass out (lose consciousness) or are very hard to wake up. Call your doctor now if you develop symptoms such as:  Shortness of breath. Fever. Cough. If you need to get care, call ahead to the doctor's office for instructions before you go. Make sure you wear a face mask, if you have one, to prevent exposing other people to the virus.   Where can you get the latest information? The following health organizations are tracking and studying this virus. Their websites contain the most up-to-date information. Bevdeisy Harris also learn what to do if you think you may have been exposed to the virus. U.S. Centers for Disease Control and Prevention (CDC): The CDC provides updated news about the disease and travel advice. The website also tells you how to prevent the spread of infection. www.cdc.gov  World Health Organization [facility]): WHO offers information about the virus outbreaks. WHO also has travel advice. www.who.int  Current as of: April 1, 2020               Content Version: 12.4  © 2006-2020 Healthwise, Incorporated. Care instructions adapted under license by your healthcare professional. If you have questions about a medical condition or this instruction, always ask your healthcare professional. [address] any warranty or liability for your use of this information. DISCHARGE SUMMARY from Nurse    The following personal items collected during your admission are returned to you:   Dental Appliance:    Vision: Visual Aid: Glasses, At home  Hearing Aid:    Jewelry:    Clothing:    Other Valuables:    Valuables sent to safe:              PATIENT INSTRUCTIONS:    After general anesthesia or intravenous sedation, for 24 hours or while taking prescription Narcotics:  Limit your activities  Do not drive and operate hazardous machinery  Do not make important personal or business decisions  Do  not drink alcoholic beverages  If you have not urinated within 8 hours after discharge, please contact your surgeon on call.     Report the following to your surgeon:  Excessive pain, swelling, redness or odor of or around the surgical area  Temperature over 100.5  Nausea and vomiting lasting longer than 4 hours or if unable to take medications  Any signs of decreased circulation or nerve impairment to extremity: change in color, persistent  numbness, tingling, coldness or increase pain  Any questions      [unfilled]    What to do at Home:  Recommended activity: as above,     If you experience any of the following symptoms as above, please follow up with Dr. Donya Tavares. *  Please give a list of your current medications to your Primary Care Provider. *  Please update this list whenever your medications are discontinued, doses are      changed, or new medications (including over-the-counter products) are added. *  Please carry medication information at all times in case of emergency situations. These are general instructions for a healthy lifestyle:    No smoking/ No tobacco products/ Avoid exposure to second hand smoke    Surgeon General's Warning:  Quitting smoking now greatly reduces serious risk to your health. Obesity, smoking, and sedentary lifestyle greatly increases your risk for illness    A healthy diet, regular physical exercise & weight monitoring are important for maintaining a healthy lifestyle    You may be retaining fluid if you have a history of heart failure or if you experience any of the following symptoms:  Weight gain of 3 pounds or more overnight or 5 pounds in a week, increased swelling in our hands or feet or shortness of breath while lying flat in bed. Please call your doctor as soon as you notice any of these symptoms; do not wait until your next office visit. Recognize signs and symptoms of STROKE:    F-face looks uneven    A-arms unable to move or move unevenly    S-speech slurred or non-existent    T-time-call 911 as soon as signs and symptoms begin-DO NOT go       Back to bed or wait to see if you get better-TIME IS BRAIN. The discharge information has been reviewed with the patient and spouse. The patient and spouse verbalized understanding. Warning Signs of HEART ATTACK     Call 911 if you have these symptoms:  Chest discomfort.  Most heart attacks involve discomfort in the center of the chest that lasts more than a few minutes, or that goes away and comes back. It can feel like uncomfortable pressure, squeezing, fullness, or pain. Discomfort in other areas of the upper body. Symptoms can include pain or discomfort in one or both arms, the back, neck, jaw, or stomach. Shortness of breath with or without chest discomfort. Other signs may include breaking out in a cold sweat, nausea, or lightheadedness. Don't wait more than five minutes to call 911 - MINUTES MATTER! Fast action can save your life. Calling 911 is almost always the fastest way to get lifesaving treatment. Emergency Medical Services staff can begin treatment when they arrive -- up to an hour sooner than if someone gets to the hospital by car. The discharge information has been reviewed with the patient and caregiver. The patient and caregiver verbalized understanding. Discharge medications reviewed with the patient and guardian and appropriate educational materials and side effects teaching were provided.     Patient armband removed and shredded

## 2022-10-17 NOTE — H&P
3340 \[facility]", MD, FACP, [place], [place]      Jordan Dominguez, DONITA Sierra, [facility]   Young Marsh, [facility]   [place]arg[place], FNP-C   Deotne Huerta, P-C    Gino Cortez, [facility]       Mark Dai Herb [address]    at [address], [facility] Sher Cabrera  22.    [phone]    FAX: [address] [place] [address]    at [address], [address]    [phone]    FAX: [phone]         PRE-PROCEDURE NOTE - EGD    H and P from last office visit reviewed. Allergies reviewed. Out-patient medicaton list reviewed. Patient Active Problem List   Diagnosis Code    Hypertension I10    Ventral hernia, unspecified, without mention of obstruction or gangrene K43.9    Hyperlipidemia E78.5    Type II or unspecified type diabetes mellitus without mention of complication, not stated as uncontrolled E11.9    GERD (gastroesophageal reflux disease) K21.9    St's esophagus K22.70    Thrombocytopenia, secondary to cirrhosis D69.59    Cirrhosis ([facility] [address] K74.60    Ascites R18.8    CKD (chronic kidney disease) N18.9    Esophageal varices (HCC) I85.00    CAD (coronary artery disease), native coronary artery I25.10    H/O alcohol abuse F10.11    H/O tobacco use, presenting hazards to health Z87.891    Type 2 diabetes mellitus with nephropathy (HCC) E11.21    Hepatitis C virus infection cured after antiviral drug therapy Z86.19       No Known Allergies    No current facility-administered medications on file prior to encounter. Current Outpatient Medications on File Prior to Encounter   Medication Sig Dispense Refill    atorvastatin (LIPITOR) 20 mg tablet Take 20 mg by mouth daily.       diclofenac (VOLTAREN) 1 % gel 4 gm      amLODIPine (NORVASC) 10 mg tablet Take 1 tablet by mouth daily      alogliptin (NESINA) 25 mg tablet Take 25 mg by mouth daily. anastrozole (ARIMIDEX) 1 mg tablet Take 1 mg by mouth every seven (7) days. terazosin (HYTRIN) 5 mg capsule Take 5 mg by mouth daily. metoprolol succinate (TOPROL-XL) 50 mg XL tablet Take 50 mg by mouth daily. Indications: HYPERTENSION  0    glipiZIDE SR (GLUCOTROL) 10 mg CR tablet Take 10 mg by mouth daily. Indications: type 2 diabetes mellitus      OMEPRAZOLE (PRILOSEC PO) Take 20 mg by mouth daily. Indications: GERD      acyclovir (ZOVIRAX) 5 % topical cream 5gram tube for 1 application Externally Five times a day prn      testosterone 30 mg/actuation (1.5 mL) slpm by TransDERmal route. For EGD to assess for esophageal and gastric varices. Plan to perform banding if indicated based upon variceal size and appearance. The risks of the procedure were discussed with the patient. These included reaction to anesthesia, pain, perforation and bleeding. All questions were answered. The patient wishes to proceed with the procedure. The patient was counseled at length about the risks of kurtis Covid-19 in the ruddy-operative and post-operative states including the recovery window of their procedure. The patient was made aware that kurtis Covid-19 after a surgical procedure may worsen their prognosis for recovering from the virus and lend to a higher morbidity and or mortality risk. The patient was given the options of postponing their procedure. All of the risks, benefits, and alternatives were discussed. The patient does  wish to proceed with the procedure. PHYSICAL EXAMINATION:  Visit Vitals  BP (!) 157/72   Pulse (!) 58   Temp 97.1 °F (36.2 °C)   Resp 14   Ht 5' 10\" (1.778 m)   Wt 189 lb 11.2 oz (86 kg)   SpO2 99%   BMI 27.22 kg/m²       General: No acute distress. Eyes: Sclera anicteric. ENT: No oral lesions. Thyroid normal.  Nodes: No adenopathy. Skin: No spider angiomata. No jaundice. No palmar erythema. Respiratory: Lungs clear to auscultation. Cardiovascular: Regular heart rate. No murmurs. No JVD. Abdomen: Soft non-tender, liver size normal to percussion/palpation. Spleen not palpable. No obvious ascites. Extremities: No edema. No muscle wasting. No gross arthritic changes. Neurologic: Alert and oriented. Cranial nerves grossly intact. No asterixis. MOST RECENT LABORATORY STUDIES:  Lab Results   Component Value Date/Time    WBC 2.5 (L) 08/31/2022 03:12 PM    HGB 11.7 (L) 08/31/2022 03:12 PM    HCT 36.1 08/31/2022 03:12 PM    PLATELET 65 (L) 60/31/5136 03:12 PM    MCV 84.5 08/31/2022 03:12 PM     Lab Results   Component Value Date/Time    INR 1.3 (H) 08/31/2022 03:12 PM    INR 1.2 01/19/2022 09:47 AM    INR 1.2 07/19/2021 11:05 AM    Prothrombin time 16.1 (H) 08/31/2022 03:12 PM    Prothrombin time 14.3 01/19/2022 09:47 AM    Prothrombin time 15.1 07/19/2021 11:05 AM       ASSESSMENT AND PLAN:  EGD to assess for esophageal and/or gastric varices.   Sedation per anesthesiology      Gustavo Troy MD  [address]  [phone]  FAX: [address] [place] [address]

## 2022-10-17 NOTE — ANESTHESIA PREPROCEDURE EVALUATION
Relevant Problems   CARDIOVASCULAR   (+) CAD (coronary artery disease), native coronary artery   (+) Hypertension      GASTROINTESTINAL   (+) Cirrhosis (HCC)   (+) GERD (gastroesophageal reflux disease)      RENAL FAILURE   (+) CKD (chronic kidney disease)      ENDOCRINE   (+) Type 2 diabetes mellitus with nephropathy (HCC)   (+) Type II or unspecified type diabetes mellitus without mention of complication, not stated as uncontrolled       Anesthetic History   No history of anesthetic complications            Review of Systems / Medical History  Patient summary reviewed, nursing notes reviewed and pertinent labs reviewed    Pulmonary  Within defined limits                 Neuro/Psych   Within defined limits           Cardiovascular    Hypertension: well controlled          CAD  Pertinent negatives: No past MI, dysrhythmias, angina, CHF, CABG and cardiac stents  Exercise tolerance: >4 METS     GI/Hepatic/Renal     GERD: well controlled    Renal disease: CRI  Liver disease  Pertinent negatives: No hepatitis   Endo/Other    Diabetes    Arthritis  Pertinent negatives: No hypothyroidism, hyperthyroidism and obesity   Other Findings   Comments: thrombocytopenia           Physical Exam    Airway  Mallampati: III  TM Distance: 4 - 6 cm  Neck ROM: normal range of motion   Mouth opening: Normal     Cardiovascular  Regular rate and rhythm,  S1 and S2 normal,  no murmur, click, rub, or gallop             Dental  No notable dental hx       Pulmonary  Breath sounds clear to auscultation               Abdominal  GI exam deferred       Other Findings            Anesthetic Plan    ASA: 3  Anesthesia type: MAC          Induction: Intravenous  Anesthetic plan and risks discussed with: Patient and Spouse

## 2022-10-17 NOTE — ANESTHESIA POSTPROCEDURE EVALUATION
Post-Anesthesia Evaluation & Assessment    Visit Vitals  /65   Pulse (!) 54   Temp 36.2 °C (97.2 °F)   Resp 16   Ht 5' 10\" (1.778 m)   Wt 86 kg (189 lb 11.2 oz)   SpO2 100%   BMI 27.22 kg/m²       No untreated/active PONV    Post-operative hydration adequate. Adequate post-operative analgesia per PACU discharge criteria    Mental status & level of consciousness: alert and oriented x 3    Respiratory status: patent unassisted airway     No apparent anesthetic complications requiring additional post-anesthetic care    Patient has met all discharge requirements.             Seth Barakat CRNA

## 2022-10-17 NOTE — PROCEDURES
[address]40 [facility], MD, [address], [facility], [place]      DONITA Tyler, [facility]   Erle Buerger, [facility]   CJ Barfield-DINA Watts, FNP-C    Terrance Herman, [facility]       Mark [facility] 136    at [facility]    [address] [facility] [place], [address] Sher Cabrera  22.    [phone]    FAX: [address] [place] [address]    [address], [address] [place] [address], [address] [place] [address] Street [address]    [phone]    FAX: [phone]         UPPER ENDOSCOPY PROCEDURE NOTE    David Waterman  1955    INDICATION: Cirrhosis. Screening for esophageal varices with variceal ligation if  indicated. : Syed Nelson MD    SURGICAL ASSISTANT:  None    PROSTHETIC DEVISES, TISSUE GRAFTS, ORGAN TRANSPLANTS:  Not applicable     ANESTHESIA/SEDATION: MAC Sedation per anesthesiology      PROCEDURE DESCRIPTION:  [employer] consent was obtained from the patient for the procedure. All risks and benefits of the procedure explained. The procedure was performed in the endoscopy suite. The patient was laying on a stretcher and moved to the left lateral decubitus position prior to administration of sedation. Sedation was administered by anesthesiology. See their note for details. The endoscope was inserted into the mouth and advanced under direct vision to the second portion of the duodenum. Careful inspection of upper gastrointestinal tract was made as the endoscope was inserted and withdrawn. Retroflexion of the endoscope to view of the cardia of the stomach was performed. The findings and interventions are described below. FINDINGS:  Esophagus:    Normal.      Stomach:   Mild portal hypertensive gastropathy of the body of the stomach  No gastric varices identified.     Duodenum: Normal bulb and second portion    SPECIMENS COLLECTED:   None    INTERVENTIONS:  None    COMPLICATIONS: None. The patient tolerated the procedure well. EBL: Negligible. RECOMMENDATIONS:  Observe until discharge parameters are achieved. May resume previous diet. Repeat endoscopy to reassess varices and need for banding in 3 years. Follow-up [facility] [place] [facility] office as scheduled.       Jaya Ronquillo MD  [address], [address] [place] [address] Street [address] [phone]  FAX: [address] [place] [address]

## 2023-03-07 ENCOUNTER — OFFICE VISIT (OUTPATIENT)
Age: 68
End: 2023-03-07
Payer: MEDICARE

## 2023-03-07 ENCOUNTER — HOSPITAL ENCOUNTER (OUTPATIENT)
Facility: HOSPITAL | Age: 68
Setting detail: SPECIMEN
Discharge: HOME | End: 2023-03-10
Payer: MEDICARE

## 2023-03-07 VITALS
SYSTOLIC BLOOD PRESSURE: 157 MMHG | TEMPERATURE: 96.2 F | HEART RATE: 60 BPM | DIASTOLIC BLOOD PRESSURE: 83 MMHG | WEIGHT: 200 LBS | OXYGEN SATURATION: 98 % | BODY MASS INDEX: 28.7 KG/M2

## 2023-03-07 DIAGNOSIS — K74.60 CIRRHOSIS OF LIVER WITHOUT ASCITES, UNSPECIFIED HEPATIC CIRRHOSIS TYPE (HCC): ICD-10-CM

## 2023-03-07 DIAGNOSIS — K74.60 CIRRHOSIS OF LIVER WITHOUT ASCITES, UNSPECIFIED HEPATIC CIRRHOSIS TYPE (HCC): Primary | ICD-10-CM

## 2023-03-07 LAB
ALBUMIN SERPL-MCNC: 3.7 G/DL (ref 3.4–5)
ALBUMIN/GLOB SERPL: 1.1 (ref 0.8–1.7)
ALP SERPL-CCNC: 121 U/L (ref 45–117)
ALT SERPL-CCNC: 27 U/L (ref 16–61)
ANION GAP SERPL CALC-SCNC: 3 MMOL/L (ref 3–18)
AST SERPL-CCNC: 25 U/L (ref 10–38)
BASOPHILS # BLD: 0 K/UL (ref 0–0.1)
BASOPHILS NFR BLD: 1 % (ref 0–2)
BILIRUB DIRECT SERPL-MCNC: 0.2 MG/DL (ref 0–0.2)
BILIRUB SERPL-MCNC: 0.6 MG/DL (ref 0.2–1)
BUN SERPL-MCNC: 32 MG/DL (ref 7–18)
BUN/CREAT SERPL: 20 (ref 12–20)
CALCIUM SERPL-MCNC: 9.3 MG/DL (ref 8.5–10.1)
CHLORIDE SERPL-SCNC: 110 MMOL/L (ref 100–111)
CO2 SERPL-SCNC: 27 MMOL/L (ref 21–32)
CREAT SERPL-MCNC: 1.6 MG/DL (ref 0.6–1.3)
DIFFERENTIAL METHOD BLD: ABNORMAL
EOSINOPHIL # BLD: 0.2 K/UL (ref 0–0.4)
EOSINOPHIL NFR BLD: 6 % (ref 0–5)
ERYTHROCYTE [DISTWIDTH] IN BLOOD BY AUTOMATED COUNT: 14.2 % (ref 11.6–14.5)
GLOBULIN SER CALC-MCNC: 3.3 G/DL (ref 2–4)
GLUCOSE SERPL-MCNC: 95 MG/DL (ref 74–99)
HCT VFR BLD AUTO: 39.9 % (ref 36–48)
HGB BLD-MCNC: 12.7 G/DL (ref 13–16)
IMM GRANULOCYTES # BLD AUTO: 0 K/UL (ref 0–0.04)
IMM GRANULOCYTES NFR BLD AUTO: 1 % (ref 0–0.5)
INR PPP: 1.1 (ref 0.8–1.2)
LYMPHOCYTES # BLD: 0.4 K/UL (ref 0.9–3.6)
LYMPHOCYTES NFR BLD: 13 % (ref 21–52)
MCH RBC QN AUTO: 27 PG (ref 24–34)
MCHC RBC AUTO-ENTMCNC: 31.8 G/DL (ref 31–37)
MCV RBC AUTO: 84.7 FL (ref 78–100)
MONOCYTES # BLD: 0.3 K/UL (ref 0.05–1.2)
MONOCYTES NFR BLD: 8 % (ref 3–10)
NEUTS SEG # BLD: 2.4 K/UL (ref 1.8–8)
NEUTS SEG NFR BLD: 72 % (ref 40–73)
NRBC # BLD: 0 K/UL (ref 0–0.01)
NRBC BLD-RTO: 0 PER 100 WBC
PLATELET # BLD AUTO: 75 K/UL (ref 135–420)
PMV BLD AUTO: 13 FL (ref 9.2–11.8)
POTASSIUM SERPL-SCNC: 4.2 MMOL/L (ref 3.5–5.5)
PROT SERPL-MCNC: 7 G/DL (ref 6.4–8.2)
PROTHROMBIN TIME: 14.9 SEC (ref 11.5–15.2)
RBC # BLD AUTO: 4.71 M/UL (ref 4.35–5.65)
SODIUM SERPL-SCNC: 140 MMOL/L (ref 136–145)
WBC # BLD AUTO: 3.3 K/UL (ref 4.6–13.2)

## 2023-03-07 PROCEDURE — 80048 BASIC METABOLIC PNL TOTAL CA: CPT

## 2023-03-07 PROCEDURE — G8484 FLU IMMUNIZE NO ADMIN: HCPCS | Performed by: NURSE PRACTITIONER

## 2023-03-07 PROCEDURE — G8427 DOCREV CUR MEDS BY ELIG CLIN: HCPCS | Performed by: NURSE PRACTITIONER

## 2023-03-07 PROCEDURE — G8417 CALC BMI ABV UP PARAM F/U: HCPCS | Performed by: NURSE PRACTITIONER

## 2023-03-07 PROCEDURE — 1123F ACP DISCUSS/DSCN MKR DOCD: CPT | Performed by: NURSE PRACTITIONER

## 2023-03-07 PROCEDURE — 85610 PROTHROMBIN TIME: CPT

## 2023-03-07 PROCEDURE — 3077F SYST BP >= 140 MM HG: CPT | Performed by: NURSE PRACTITIONER

## 2023-03-07 PROCEDURE — 99214 OFFICE O/P EST MOD 30 MIN: CPT | Performed by: NURSE PRACTITIONER

## 2023-03-07 PROCEDURE — 1036F TOBACCO NON-USER: CPT | Performed by: NURSE PRACTITIONER

## 2023-03-07 PROCEDURE — 82107 ALPHA-FETOPROTEIN L3: CPT

## 2023-03-07 PROCEDURE — 85025 COMPLETE CBC W/AUTO DIFF WBC: CPT

## 2023-03-07 PROCEDURE — 80076 HEPATIC FUNCTION PANEL: CPT

## 2023-03-07 PROCEDURE — 3017F COLORECTAL CA SCREEN DOC REV: CPT | Performed by: NURSE PRACTITIONER

## 2023-03-07 PROCEDURE — 3079F DIAST BP 80-89 MM HG: CPT | Performed by: NURSE PRACTITIONER

## 2023-03-07 PROCEDURE — 36415 COLL VENOUS BLD VENIPUNCTURE: CPT

## 2023-03-07 RX ORDER — DAPAGLIFLOZIN 10 MG/1
TABLET, FILM COATED ORAL
COMMUNITY
Start: 2023-01-21

## 2023-03-07 RX ORDER — OMEPRAZOLE 20 MG/1
CAPSULE, DELAYED RELEASE ORAL
COMMUNITY
Start: 2022-12-03

## 2023-03-07 NOTE — PROGRESS NOTES
[address] [facility], MD, [address], [facility], [place]      Brianaron Phelan, PA-C Ransom Halsted, ACN-BC     April S Jailene, [facility]   CORRINA GarciaP-BRIAN Adam, [facility]       Campbell V[place] Omar De [address]    at [address], [address] Yaakov Vegas  22.    [phone]    FAX: [address] [place] [address]    at [address], [address], [address]    [phone]    FAX: [phone]       Patient Care Team:  Jessica Sutton MD as PCP - General (Family Medicine)  Magdiel Baptiste MD (Internal Medicine Physician)  Juanito Nunez MD (Gastroenterology)  Fransisca Alvarado MD (Nephrology)      Problem List  Date Reviewed: 7/19/2021            Codes Class Noted    Hepatitis C virus infection cured after antiviral drug therapy ICD-10-CM: Z86.19  ICD-9-CM: V12.09  1/19/2021        Type 2 diabetes mellitus with nephropathy [facility]) ICD-10-CM: E11.21  ICD-9-CM: 250.40, 583.81  1/30/2018        H/O alcohol abuse ICD-10-CM: F10.11  ICD-9-CM: 305.03  Unknown    Overview Signed 4/27/2016  6:37 PM by Jarrett Yoo     quit few months ago, particpates in [place] [address]             H/O tobacco use, presenting hazards to health ICD-10-CM: Z87.891  ICD-9-CM: V15.82  Unknown    Overview Signed 4/27/2016  6:37 PM by Jarrett Yoo     quit few months ago             CAD (coronary artery disease), native coronary artery ICD-10-CM: I25.10  ICD-9-CM: 414.01  Unknown    Overview Addendum 1/3/2014  3:55 PM by Jarrett Walter     p/mLAD 60% ((FFR 0.84). Otherwise mild coronary artery disease (march 2013).  LV EF 65% (echo feb 2013)             CKD (chronic kidney disease) ICD-10-CM: N18.9  ICD-9-CM: 585.9  2/26/2013        Esophageal varices ([facility] ICD-10-CM: I85.00  ICD-9-CM: 456.1  2/26/2013        Cirrhosis ([facility] ICD-10-CM: K74.60  ICD-9-CM: 571.5  2/2/2013    Overview Signed 2/2/2013  5:22 PM by Corrinne Garden, MD     Secondary to chronic HCV             Ascites ICD-10-CM: R18.8  ICD-9-CM: 789.59  2/2/2013        Hypertension ICD-10-CM: I10  ICD-9-CM: 401.9  5/23/2011        Ventral hernia, unspecified, without mention of obstruction or gangrene ICD-10-CM: K43.9  ICD-9-CM: 553.20  5/23/2011        Hyperlipidemia ICD-10-CM: E78.5  ICD-9-CM: 272.4  5/23/2011        Type II or unspecified type diabetes mellitus without mention of complication, not stated as uncontrolled ICD-10-CM: E11.9  ICD-9-CM: 250.00  5/23/2011        GERD (gastroesophageal reflux disease) ICD-10-CM: K21.9  ICD-9-CM: 530.81  5/23/2011        Garcia's esophagus ICD-10-CM: K22.70  ICD-9-CM: 530.85  5/23/2011        Thrombocytopenia, secondary to cirrhosis ICD-10-CM: D69.59  ICD-9-CM: 287.49  5/23/2011           Carina Talamantes returns to the [facility] for monitoring of cirrhosis from chronic HCV. The HCV has been treated and cured. The active problem list, all pertinent past medical history, medications, liver histology, endoscopic studies, radiologic findings and laboratory findings related to the liver disorder were reviewed with the patient. Ascites resolved. The patient has not developed hepatic encephalopathy. The patient has esophageal varices without bleeding. The patient has began, but has not completed liver transplant evaluation testing. The Thallium stress test demonstrated a fixed and 2 small reversible lesions. Cardiac catheterization did not indicate the need for stenting. Mr. Miller Dsouza was noted to have a nodule on his thyroid while being worked up for a pleural effusion. He had a biopsy that was negative for malignancy. The patient had a LI-RADS 4 lesions from abdominal MRI performed in 11/2017. This was worked up and biopsied.   Biopsy demonstrated that this lesion is a hemangioma. We have discussed the liver transplant process and various centers he could go to for liver transplantation. He would like to be seen at [place], [place] if this becomes neccessary. Mr. Angeline Duran completed 12 weeks of treatment with sofosbuvir and simeprevir in June 2014. He had a SVR two years post treatment. Mr. Elio Saunders denies any further alcohol use since January 2016. He states that he continues to participate in [place]. Dr. Romario Wheat has performed HALO procedures for Garcia esophagus. Dr. Romario Wheat has released the patient. Mr. Angeline Duran is overall feeling well since his last office visit. His lower extremity edema has completely resolved and he is currently not taking any diuretics. He deneis any shortness of breath. The patient has no complaints which can be attributed to liver disease. The patient has not experienced problems concentrating, swelling of the abdomen, swelling of the lower extremities, hematemesis, hematochezia. Since the last office appointment the patient has:  Had no changes in the liver disease. Continues in .  7+ year sobriety. 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 The most recent laboratory studies indicate that the liver transaminases are normal, alkaline phosphatase is elevated, tests of hepatic synthetic and metabolic function are normal, and the platelet count is depressed. Will perform laboratory testing to monitor liver function and degree of liver injury. This will include hepatic panel, a CBC w/ diff, a BMP, a PT/INR, and an AFP-L3%. Complications of cirrhosis were discussed in detail. We discussed thrombocytopenia, portal hypertension, varices, GI bleeding, peripheral edema, ascites, hepatic encephalopathy, and hepatocellular carcinoma. We discussed the need for follow ups on a regular basis to monitor for complications.  We discussed the need for every 6 month liver imaging studies. Fibroscan analysis performed in 12/2022 indicates cirrhosis. There is no evidence of fatty liver disease. Will repeat the fibroscan when the patient returns for follow up in one year. Chronic HCV, genotype 1. He completed twelve weeks of treatment with simeprevir and sofosbuvir in June 2014. He had a SVR 2 years post treatment. Peripheral edema. Resolved. Esophageal varicies without prior bleeding. He has had repeat EGDs with HALO procedure performed. No varices identified. Most recent EGD was performed by Dr. Lanny Lugo. Patient reports that he did no have any esophageal varices. Dr. Lanny Lugo has retired. EGD ordered today to be performed  by Dr. Kayode Marlow. Hepatic encephalopathy has not developed to date. There is no need for treatment with lactulose and/or xifaxan at this time. ETOH abuse. He has not consumed any alcohol since 01/2016. Continue AA. The patient had a postive thalium stress test with reversible ischemia. He has a cardiac catheterization. No significant ASCAD identified. Thrombocytopenia secondary to cirrhosis. There is no evidence of overt bleeding. There is no indication for platelet transfusions or pharmacologic treatment to increase the platelet count. [address] screening. Imaging suggested that Mr. Brianne Haas had developed [address] He was referred to Dr. Stalin Garcia for evaluation. The LI-RADS 4 lesion was biopsied and determined to be a hemangioma. The plan was to continue with advanced imaging, either MRI or CT, every 6 months moving forward. Lesion now classified as LI-RADS 2. We will therefore perform ultrasound alternating with advanced imaging at 6 month intervals. These, along with AFP's every 6 months, should suffice for [address] screenings. Ultrasound was ordered today to be performed in 09/2022. All of the above issues were discussed with the patient. All questions were answered.   The patient expressed a clear understanding of the above. ALLERGIES:  No Known Allergies    Current Outpatient Medications   Medication Sig    amLODIPine (NORVASC) 10 mg tablet Take 1 tablet by mouth daily    hydroCHLOROthiazide (HYDRODIURIL) 25 mg tablet     alogliptin (NESINA) 25 mg tablet Take 25 mg by mouth daily. nitroglycerin (NITROSTAT) 0.4 mg SL tablet 1 Tab by SubLINGual route every five (5) minutes as needed for Chest Pain. Up to 3 doses. anastrozole (ARIMIDEX) 1 mg tablet Take 1 mg by mouth every seven (7) days. testosterone 30 mg/actuation (1.5 mL) slpm by TransDERmal route. terazosin (HYTRIN) 5 mg capsule Take 5 mg by mouth daily. atorvastatin (LIPITOR) 10 mg tablet Take 20 mg by mouth daily. metoprolol succinate (TOPROL-XL) 50 mg XL tablet Take 50 mg by mouth daily. Indications: HYPERTENSION    glipiZIDE SR (GLUCOTROL) 10 mg CR tablet Take 10 mg by mouth daily. Indications: type 2 diabetes mellitus    OMEPRAZOLE (PRILOSEC PO) Take 20 mg by mouth daily. Indications: GERD     No current facility-administered medications for this visit. SYSTEM REVIEW NOT RELATED TO LIVER DISEASE OR REVIEWED ABOVE:  Constitution systems: Negative for fever, chills, weight gain, weight loss. Eyes: Negative for visual changes. ENT: Negative for sore throat, painful swallowing. Respiratory: Negative for cough, hemoptysis, SOB. Cardiology: Negative for chest pain, palpitations. GI:  Negative for constipation or diarrhea. : Negative for urinary frequency, dysuria, hematuria, nocturia. Skin: Negative for rash. Hematology: Positive for easy bruising. Negative for blood clots. Musculo-skelatal: Negative for back pain, muscle pain, weakness. Neurologic: Negative for headaches, dizziness, vertigo, memory problems not related to HE. Psychology: Negative for anxiety, depression. FAMILY/SOCIAL HISTORY:  The patient is . The patient has 2 children. Stoped smoking 1/2016.   History of heavy alcohol use. Last alcohol consumption was 1/2016. The patient retired in 2010. PHYSICAL EXAMINATION:  Visit Vitals  BP (!) 155/85 (BP 1 Location: Left upper arm, BP Patient Position: Sitting, BP Cuff Size: Small adult)   Pulse 62   Temp (!) 96.4 °F (35.8 °C)   Resp 17   Ht 5' 10\" (1.778 m)   Wt 197 lb (89.4 kg)   SpO2 98%   BMI 28.27 kg/m²       PHYSICAL EXAMINATION:  VS: per nursing note  General: No acute distress. Eyes: Sclera anicteric. ENT: No oral lesions. Thyroid normal.  Nodes: No adenopathy. Skin: No spider angiomata. No jaundice. No palmar erythema. Respiratory: Lungs clear to auscultation. Cardiovascular: Regular heart rate. No murmurs. No JVD. Abdomen: Soft non-tender, liver size normal to percussion/palpation. Spleen not palpable. No obvious ascites. Extremities: No edema. No muscle wasting. No gross arthritic changes. Neurologic: Alert and oriented. Cranial nerves grossly intact. No asterixis. LABORATORY:  [facility] [place] of [address] Units 8/31/2022 1/19/2022   WBC 4.6 - 13.2 K/uL 2.5 (L) 3.1 (L)   ANC 1.8 - 8.0 K/UL 1.9 2.2   HGB 13.0 - 16.0 g/dL 11.7 (L) 13.4    - 420 K/uL 65 (L) 75 (L)   INR 0.8 - 1.2   1.3 (H) 1.2   AST 10 - 38 U/L 29 25   ALT 16 - 61 U/L 30 37   Alk Phos 45 - 117 U/L 107 133 (H)   Bili, Total 0.2 - 1.0 MG/DL 0.6 0.4   Bili, Direct 0.0 - 0.2 MG/DL 0.2 0.1   Albumin 3.4 - 5.0 g/dL 3.5 3.5   BUN 7.0 - 18 MG/DL 32 (H) 27 (H)   Creat 0.6 - 1.3 MG/DL 1.72 (H) 1.57 (H)   Creat (iSTAT) 0.6 - 1.3 MG/DL     Na 136 - 145 mmol/L 141 143   K 3.5 - 5.5 mmol/L 4.3 5.0   Cl 100 - 111 mmol/L 112 (H) 112 (H)   CO2 21 - 32 mmol/L 26 29   Glucose 74 - 99 mg/dL 109 (H) 112 (H)     Cancer Screening Latest Ref Rng & Units 8/31/2022 1/19/2022 7/19/2021   AFP Tumor Marker 0 - 9 ng/mL      AFP, Serum 0.0 - 8.4 ng/mL 3.8 4.4 4.6   AFP-L3% 0.0 - 9.9 % Comment Comment Comment     SEROLOGY  2/2012.   Anti-HCV negative, CMV IgG positive, EBV IgG positive,     LIVER HISTOLOGY  12/2017. Liver mass biopsy. Hepatocytes show some mild steatosis. The lesion in this case is a hemangioma, and there are several fragments of it along with the liver tissue, characterized by a fibrous background and scattered simple, capillary-like vessels. The architecture is cavernous. There is no evidence of malignancy. 12/2018. TRANSIENT HEPATIC ELASTOGRAPHY:   E Range: 8.9-13.9 kPa  E Mean: 11.5 kPa  E Median: 11.6 kPa  E Std: 1.7 kPa    07/2020. TRANSIENT HEPATIC ELASTOGRAPHY:   E Range: 6.8-14.9 kPa  E Mean: 10.0 kPa  E Median: 8.9 kPa  E Std: 2.8 kPa    1/2022. FibroScan performed at [address] EkPa was 29.1. IQR/med 13%. . The results suggested a fibrosis level of F4. The CAP score suggests there is no significant hepatic steatosis. ENDOSCOPIC PROCEDURES  2/2017:  EGD with HALO per Dr. Trice Brcok. Repeat in 3 months. 05/2017. EGD by Dr. Swetha Rod. HALO for Garcia's. Repeat in 3 months. 08/2017. EGD by Dr. Swetha Rod. HALO for Garcia's. Repeat in 3 months. 02/2018. EGD by Dr. Swetha Rod. Esophagus: Few small 3-4 mm salmon colored islands were seen at the 38 cm. No nodularity was seen. Grade 1 varices were seen. Stomach: Streaky erythema was noted in the antrum radiating towards the pylorus, consistent with gastric antral vascular ectasia. Duodenum/jejunum:normal.  Variceal ablation (including the Garcia's mucosa) was performed with 2 of bands placed. Repeat in one year. 04/2019. EGD by Dr. Swetha Rod. Irregular Z line was noted. No nodularity was seen. No [place] colored islands were noted. No varices were seen. Scarring from previous band ligation was noted. Repeat in one year. 12/2020. EGD by Dr. Phillip Wild. No report available. Patient reports no banding. 10/2022. EGD by Dr. Michael Alvares. Normal esophagus. Repeat EGD in 3 years. RADIOLOGY  11/2017. Abdominal MRI w/wo contrast.  Hepatic cirrhosis.   Li-RADS 4 lesion in hepatic segment 3 (probable hepatocellular carcinoma). Faint arterial phase hyperenhancement throughout the left hepatic lobe. While infiltrative lesion not absolutely excluded, suspect finding reflects intralesional shunting related to the lesion. Scattered additional Li-RADS 3 hepatic lesions (indeterminate). 12/2017. Abdominal CT w/wo contrast. Complex hypervascular lesion in the periphery of segment 3 of the left hepatic lobe as described and measured above. Minimal interval increase in size. LI-RADS 4. Developing hepatoma is most likely. 06/2018. Ultrasound of the liver. Cirrhosis. Ovoid hyperechoic nodule in the right lobe, unchanged in the interval. Prior biopsy confirmed this as a hemangioma. 12/2018. Ultrasound of the liver. Coarsely heterogeneous echotexture with a nodular contour and prominent caudate lobe, in keeping with history of cirrhosis. Redemonstrated hyperechoic mass measuring 1.4 x 1.3 x 1.0 cm and the left hepatic lobe, in keeping with a hemangioma demonstrated on prior studies which was previously biopsied. 07/2019. Dynamic MRI of the liver. Slowly growing 2.7 cm mass without arterial enhancement, with washout in hepatic segment IVb. This mass measured up to 2.1 cm thousand 17 and 1.5 cm 2014. LIRADS 4. Fibrotic changes within the right liver have progressed since 2017. Hemangioma and associated shunting within the left lateral liver are unchanged since 2017.  12/2019. Dynamic MRI of the liver. Cirrhosis with no significant change in size or appearance of previously described T2 hypointense nodule in the right hepatic lobe. Given stability as well as current imaging features, this most likely represents a nodular area of liver parenchyma which is accentuated by the adjacent background of confluent fibrosis rather than a true mass. LR-2. Left hepatic lobe hemangioma and adjacent heterogeneous enhancement again identified, unchanged. LR-1.   No new suspicious nodules or masses identified in the liver. 07/2020. Ultrasound of the liver. Cirrhosis. Left hepatic lobe hemangioma 1.4 cm. No suspicious focal hepatic mass. 02/2021. Ultrasound of the liver. Slightly coarsened increased echotexture. 1.0 x 1.1 cm hyperechoic nodule in the left hepatic lobe which corresponds with hemangioma described on prior MRI .  07/2021. Ultrasound of the liver. Stable appearance of liver cirrhosis  and presumed left hepatic lobe  hemangioma. Normal portal venous flow. 02/2022. Ultrasound of the liver. Grossly similar findings of hepatic cirrhosis. Interval increase in size and  hyperechoic hepatic nodule measuring 3.3 cm in greatest dimension on today's exam.  02/2022. Dynamic MRI of the liver. Cirrhosis with sequela of portal hypertension including splenomegaly.  -Observation #1 in the left liver appears similar to prior study, possible sclerosed hemangioma: LR-1.  -Observation #2 in the right liver has minimally increased since prior, likely lobular parenchyma: LR-2.  -No new suspicious liver mass. 09/2022. Ultrasound of the liver. Coarse echotexture and nodular liver contour consistent with cirrhosis. There is a hyperechoic focus within the left lobe which may represent small calcification or hemangioma. Follow up  25 minutes total time spent with this patient with more than 50% of this time spent counseling and coordinating care as described above. [email or web address] in 6 months for fibroscan and continued monitoring.         CORRINA MullenP-C  Liver [place] of [address], [address] [place] [address] Tomasa Joel, [address] [place] [address]   [phone]

## 2023-03-08 LAB
AFP L3 MFR SERPL: NORMAL % (ref 0–9.9)
AFP SERPL-MCNC: 3.3 NG/ML (ref 0–8.4)

## 2023-04-19 ENCOUNTER — HOSPITAL ENCOUNTER (OUTPATIENT)
Facility: HOSPITAL | Age: 68
Discharge: HOME | End: 2023-04-22
Payer: MEDICARE

## 2023-04-19 DIAGNOSIS — K74.60 CIRRHOSIS OF LIVER WITHOUT ASCITES, UNSPECIFIED HEPATIC CIRRHOSIS TYPE (HCC): ICD-10-CM

## 2023-04-19 PROCEDURE — 76705 ECHO EXAM OF ABDOMEN: CPT

## 2023-09-19 ENCOUNTER — OFFICE VISIT (OUTPATIENT)
Age: 68
End: 2023-09-19
Payer: MEDICARE

## 2023-09-19 ENCOUNTER — HOSPITAL ENCOUNTER (OUTPATIENT)
Facility: HOSPITAL | Age: 68
Setting detail: SPECIMEN
Discharge: HOME | End: 2023-09-22
Payer: MEDICARE

## 2023-09-19 VITALS
DIASTOLIC BLOOD PRESSURE: 74 MMHG | HEART RATE: 55 BPM | OXYGEN SATURATION: 98 % | TEMPERATURE: 97.9 F | HEIGHT: 70 IN | BODY MASS INDEX: 27.92 KG/M2 | SYSTOLIC BLOOD PRESSURE: 140 MMHG | WEIGHT: 195 LBS

## 2023-09-19 DIAGNOSIS — K74.60 CIRRHOSIS OF LIVER WITHOUT ASCITES, UNSPECIFIED HEPATIC CIRRHOSIS TYPE (HCC): Primary | ICD-10-CM

## 2023-09-19 DIAGNOSIS — K74.60 CIRRHOSIS OF LIVER WITHOUT ASCITES, UNSPECIFIED HEPATIC CIRRHOSIS TYPE (HCC): ICD-10-CM

## 2023-09-19 LAB
ALBUMIN SERPL-MCNC: 3.7 G/DL (ref 3.4–5)
ALBUMIN/GLOB SERPL: 1.1 (ref 0.8–1.7)
ALP SERPL-CCNC: 106 U/L (ref 45–117)
ALT SERPL-CCNC: 27 U/L (ref 16–61)
ANION GAP SERPL CALC-SCNC: 3 MMOL/L (ref 3–18)
AST SERPL-CCNC: 22 U/L (ref 10–38)
BASOPHILS # BLD: 0 K/UL (ref 0–0.1)
BASOPHILS NFR BLD: 1 % (ref 0–2)
BILIRUB DIRECT SERPL-MCNC: 0.2 MG/DL (ref 0–0.2)
BILIRUB SERPL-MCNC: 0.7 MG/DL (ref 0.2–1)
BUN SERPL-MCNC: 28 MG/DL (ref 7–18)
BUN/CREAT SERPL: 18 (ref 12–20)
CALCIUM SERPL-MCNC: 9 MG/DL (ref 8.5–10.1)
CHLORIDE SERPL-SCNC: 110 MMOL/L (ref 100–111)
CO2 SERPL-SCNC: 28 MMOL/L (ref 21–32)
CREAT SERPL-MCNC: 1.6 MG/DL (ref 0.6–1.3)
DIFFERENTIAL METHOD BLD: ABNORMAL
EOSINOPHIL # BLD: 0.2 K/UL (ref 0–0.4)
EOSINOPHIL NFR BLD: 7 % (ref 0–5)
ERYTHROCYTE [DISTWIDTH] IN BLOOD BY AUTOMATED COUNT: 14.3 % (ref 11.6–14.5)
GLOBULIN SER CALC-MCNC: 3.5 G/DL (ref 2–4)
GLUCOSE SERPL-MCNC: 152 MG/DL (ref 74–99)
HCT VFR BLD AUTO: 41.3 % (ref 36–48)
HGB BLD-MCNC: 13.5 G/DL (ref 13–16)
IMM GRANULOCYTES # BLD AUTO: 0 K/UL (ref 0–0.04)
IMM GRANULOCYTES NFR BLD AUTO: 1 % (ref 0–0.5)
INR PPP: 1.2 (ref 0.9–1.1)
LYMPHOCYTES # BLD: 0.4 K/UL (ref 0.9–3.6)
LYMPHOCYTES NFR BLD: 13 % (ref 21–52)
MCH RBC QN AUTO: 28 PG (ref 24–34)
MCHC RBC AUTO-ENTMCNC: 32.7 G/DL (ref 31–37)
MCV RBC AUTO: 85.7 FL (ref 78–100)
MONOCYTES # BLD: 0.2 K/UL (ref 0.05–1.2)
MONOCYTES NFR BLD: 7 % (ref 3–10)
NEUTS SEG # BLD: 2.4 K/UL (ref 1.8–8)
NEUTS SEG NFR BLD: 72 % (ref 40–73)
NRBC # BLD: 0 K/UL (ref 0–0.01)
NRBC BLD-RTO: 0 PER 100 WBC
PLATELET # BLD AUTO: 68 K/UL (ref 135–420)
PMV BLD AUTO: 12.9 FL (ref 9.2–11.8)
POTASSIUM SERPL-SCNC: 4.5 MMOL/L (ref 3.5–5.5)
PROT SERPL-MCNC: 7.2 G/DL (ref 6.4–8.2)
PROTHROMBIN TIME: 14.9 SEC (ref 11.9–14.7)
RBC # BLD AUTO: 4.82 M/UL (ref 4.35–5.65)
SODIUM SERPL-SCNC: 141 MMOL/L (ref 136–145)
WBC # BLD AUTO: 3.4 K/UL (ref 4.6–13.2)

## 2023-09-19 PROCEDURE — G8417 CALC BMI ABV UP PARAM F/U: HCPCS | Performed by: NURSE PRACTITIONER

## 2023-09-19 PROCEDURE — 99214 OFFICE O/P EST MOD 30 MIN: CPT | Performed by: NURSE PRACTITIONER

## 2023-09-19 PROCEDURE — 85610 PROTHROMBIN TIME: CPT

## 2023-09-19 PROCEDURE — 1123F ACP DISCUSS/DSCN MKR DOCD: CPT | Performed by: NURSE PRACTITIONER

## 2023-09-19 PROCEDURE — 80048 BASIC METABOLIC PNL TOTAL CA: CPT

## 2023-09-19 PROCEDURE — 36415 COLL VENOUS BLD VENIPUNCTURE: CPT

## 2023-09-19 PROCEDURE — 80076 HEPATIC FUNCTION PANEL: CPT

## 2023-09-19 PROCEDURE — 3077F SYST BP >= 140 MM HG: CPT | Performed by: NURSE PRACTITIONER

## 2023-09-19 PROCEDURE — 91200 LIVER ELASTOGRAPHY: CPT | Performed by: NURSE PRACTITIONER

## 2023-09-19 PROCEDURE — 85025 COMPLETE CBC W/AUTO DIFF WBC: CPT

## 2023-09-19 PROCEDURE — G8427 DOCREV CUR MEDS BY ELIG CLIN: HCPCS | Performed by: NURSE PRACTITIONER

## 2023-09-19 PROCEDURE — 82107 ALPHA-FETOPROTEIN L3: CPT

## 2023-09-19 PROCEDURE — 3078F DIAST BP <80 MM HG: CPT | Performed by: NURSE PRACTITIONER

## 2023-09-19 PROCEDURE — 1036F TOBACCO NON-USER: CPT | Performed by: NURSE PRACTITIONER

## 2023-09-19 PROCEDURE — 3017F COLORECTAL CA SCREEN DOC REV: CPT | Performed by: NURSE PRACTITIONER

## 2023-09-19 NOTE — PROGRESS NOTES
Stomach: Streaky erythema was noted in the antrum radiating towards the pylorus, consistent with gastric antral vascular ectasia. Duodenum/jejunum:normal.  Variceal ablation (including the Garcia's mucosa) was performed with 2 of bands placed. Repeat in one year. 04/2019. EGD by Dr. Hector Griffiths. Irregular Z line was noted. No nodularity was seen. No [place] colored islands were noted. No varices were seen. Scarring from previous band ligation was noted. Repeat in one year. 12/2020. EGD by Dr. Humberto Martell. No report available. Patient reports no banding. 10/2022. EGD by Dr. Tea Rubio. Normal esophagus. Repeat EGD in 3 years. RADIOLOGY  02/2022. Ultrasound of the liver. Grossly similar findings of hepatic cirrhosis. Interval increase in size and hyperechoic hepatic nodule measuring 3.3 cm in greatest dimension on today's exam.  02/2022. Dynamic MRI of the liver. Cirrhosis with sequela of portal hypertension including splenomegaly.  -Observation #1 in the left liver appears similar to prior study, possible sclerosed hemangioma: LR-1.  -Observation #2 in the right liver has minimally increased since prior, likely lobular parenchyma: LR-2.  -No new suspicious liver mass. 10/2022. Ultrasound of the liver. Coarse echotexture and nodular liver contour consistent with cirrhosis. There is a hyperechoic focus within the left lobe which may represent small calcification or hemangioma. 04/2023. Ultrasound of the liver. Cirrhotic liver. There is an echogenic ovoid structure in the right lobe of the liver measuring 0.4 x 0.3 x 0.4 cm. No distinct solid mass or cystic lesion is detected in the visualized segments of the liver. Follow up  25 minutes total time spent with this patient with more than 50% of this time spent counseling and coordinating care as described above. Jennifer in 6 months for continued monitoring.         DENI Brown  Liver

## 2023-09-20 LAB
AFP L3 MFR SERPL: NORMAL % (ref 0–9.9)
AFP SERPL-MCNC: 3.4 NG/ML (ref 0–8.4)

## 2023-09-27 ENCOUNTER — HOSPITAL ENCOUNTER (OUTPATIENT)
Facility: HOSPITAL | Age: 68
Discharge: HOME | End: 2023-09-30
Payer: MEDICARE

## 2023-09-27 DIAGNOSIS — K74.60 CIRRHOSIS OF LIVER WITHOUT ASCITES, UNSPECIFIED HEPATIC CIRRHOSIS TYPE (HCC): ICD-10-CM

## 2023-09-27 PROCEDURE — 76705 ECHO EXAM OF ABDOMEN: CPT

## 2024-03-21 ENCOUNTER — OFFICE VISIT (OUTPATIENT)
Age: 69
End: 2024-03-21
Payer: MEDICARE

## 2024-03-21 ENCOUNTER — HOSPITAL ENCOUNTER (OUTPATIENT)
Facility: HOSPITAL | Age: 69
Setting detail: SPECIMEN
Discharge: HOME | End: 2024-03-21
Payer: MEDICARE

## 2024-03-21 VITALS
RESPIRATION RATE: 18 BRPM | HEIGHT: 70 IN | HEART RATE: 70 BPM | BODY MASS INDEX: 27.2 KG/M2 | SYSTOLIC BLOOD PRESSURE: 118 MMHG | WEIGHT: 190 LBS | OXYGEN SATURATION: 99 % | DIASTOLIC BLOOD PRESSURE: 78 MMHG

## 2024-03-21 DIAGNOSIS — K74.60 CIRRHOSIS OF LIVER WITHOUT ASCITES, UNSPECIFIED HEPATIC CIRRHOSIS TYPE (HCC): ICD-10-CM

## 2024-03-21 DIAGNOSIS — K74.60 CIRRHOSIS OF LIVER WITHOUT ASCITES, UNSPECIFIED HEPATIC CIRRHOSIS TYPE (HCC): Primary | ICD-10-CM

## 2024-03-21 LAB
ALBUMIN SERPL-MCNC: 3.8 G/DL (ref 3.4–5)
ALBUMIN/GLOB SERPL: 1.1 (ref 0.8–1.7)
ALP SERPL-CCNC: 120 U/L (ref 45–117)
ALT SERPL-CCNC: 29 U/L (ref 16–61)
ANION GAP SERPL CALC-SCNC: 4 MMOL/L (ref 3–18)
AST SERPL-CCNC: 30 U/L (ref 10–38)
BASOPHILS # BLD: 0 K/UL (ref 0–0.1)
BASOPHILS NFR BLD: 1 % (ref 0–2)
BILIRUB DIRECT SERPL-MCNC: 0.2 MG/DL (ref 0–0.2)
BILIRUB SERPL-MCNC: 0.6 MG/DL (ref 0.2–1)
BUN SERPL-MCNC: 36 MG/DL (ref 7–18)
BUN/CREAT SERPL: 20 (ref 12–20)
CALCIUM SERPL-MCNC: 8.9 MG/DL (ref 8.5–10.1)
CHLORIDE SERPL-SCNC: 110 MMOL/L (ref 100–111)
CO2 SERPL-SCNC: 27 MMOL/L (ref 21–32)
CREAT SERPL-MCNC: 1.83 MG/DL (ref 0.6–1.3)
DIFFERENTIAL METHOD BLD: ABNORMAL
EOSINOPHIL # BLD: 0.2 K/UL (ref 0–0.4)
EOSINOPHIL NFR BLD: 6 % (ref 0–5)
ERYTHROCYTE [DISTWIDTH] IN BLOOD BY AUTOMATED COUNT: 13.1 % (ref 11.6–14.5)
GLOBULIN SER CALC-MCNC: 3.4 G/DL (ref 2–4)
GLUCOSE SERPL-MCNC: 139 MG/DL (ref 74–99)
HCT VFR BLD AUTO: 41.6 % (ref 36–48)
HGB BLD-MCNC: 13.4 G/DL (ref 13–16)
IMM GRANULOCYTES # BLD AUTO: 0 K/UL (ref 0–0.04)
IMM GRANULOCYTES NFR BLD AUTO: 1 % (ref 0–0.5)
INR PPP: 1.1 (ref 0.9–1.1)
LYMPHOCYTES # BLD: 0.4 K/UL (ref 0.9–3.6)
LYMPHOCYTES NFR BLD: 13 % (ref 21–52)
MCH RBC QN AUTO: 27.7 PG (ref 24–34)
MCHC RBC AUTO-ENTMCNC: 32.2 G/DL (ref 31–37)
MCV RBC AUTO: 86 FL (ref 78–100)
MONOCYTES # BLD: 0.2 K/UL (ref 0.05–1.2)
MONOCYTES NFR BLD: 6 % (ref 3–10)
NEUTS SEG # BLD: 2.2 K/UL (ref 1.8–8)
NEUTS SEG NFR BLD: 74 % (ref 40–73)
NRBC # BLD: 0 K/UL (ref 0–0.01)
NRBC BLD-RTO: 0 PER 100 WBC
PLATELET # BLD AUTO: 70 K/UL (ref 135–420)
PMV BLD AUTO: 13.1 FL (ref 9.2–11.8)
POTASSIUM SERPL-SCNC: 4.2 MMOL/L (ref 3.5–5.5)
PROT SERPL-MCNC: 7.2 G/DL (ref 6.4–8.2)
PROTHROMBIN TIME: 14.8 SEC (ref 11.9–14.7)
RBC # BLD AUTO: 4.84 M/UL (ref 4.35–5.65)
SODIUM SERPL-SCNC: 141 MMOL/L (ref 136–145)
WBC # BLD AUTO: 3 K/UL (ref 4.6–13.2)

## 2024-03-21 PROCEDURE — 3078F DIAST BP <80 MM HG: CPT | Performed by: NURSE PRACTITIONER

## 2024-03-21 PROCEDURE — G8417 CALC BMI ABV UP PARAM F/U: HCPCS | Performed by: NURSE PRACTITIONER

## 2024-03-21 PROCEDURE — 1036F TOBACCO NON-USER: CPT | Performed by: NURSE PRACTITIONER

## 2024-03-21 PROCEDURE — 3017F COLORECTAL CA SCREEN DOC REV: CPT | Performed by: NURSE PRACTITIONER

## 2024-03-21 PROCEDURE — 99214 OFFICE O/P EST MOD 30 MIN: CPT | Performed by: NURSE PRACTITIONER

## 2024-03-21 PROCEDURE — G8484 FLU IMMUNIZE NO ADMIN: HCPCS | Performed by: NURSE PRACTITIONER

## 2024-03-21 PROCEDURE — 85610 PROTHROMBIN TIME: CPT

## 2024-03-21 PROCEDURE — 80048 BASIC METABOLIC PNL TOTAL CA: CPT

## 2024-03-21 PROCEDURE — 85025 COMPLETE CBC W/AUTO DIFF WBC: CPT

## 2024-03-21 PROCEDURE — G8428 CUR MEDS NOT DOCUMENT: HCPCS | Performed by: NURSE PRACTITIONER

## 2024-03-21 PROCEDURE — 80076 HEPATIC FUNCTION PANEL: CPT

## 2024-03-21 PROCEDURE — 1123F ACP DISCUSS/DSCN MKR DOCD: CPT | Performed by: NURSE PRACTITIONER

## 2024-03-21 PROCEDURE — 36415 COLL VENOUS BLD VENIPUNCTURE: CPT

## 2024-03-21 PROCEDURE — 3074F SYST BP LT 130 MM HG: CPT | Performed by: NURSE PRACTITIONER

## 2024-03-21 PROCEDURE — 82107 ALPHA-FETOPROTEIN L3: CPT

## 2024-03-21 NOTE — PROGRESS NOTES
for rash.  Hematology: Positive for easy bruising. Negative for blood clots.    Musculo-skelatal: Negative for back pain, muscle pain, weakness.   Neurologic: Negative for headaches, dizziness, vertigo, memory problems not related to HE.    Psychology: Negative for anxiety, depression.     FAMILY/SOCIAL HISTORY:  The patient is .  The patient has 2 children.  Stoped smoking 1/2016.  History of heavy alcohol use.  Last alcohol consumption was 1/2016. The patient retired in 2010.      PHYSICAL EXAMINATION:  /78 (Site: Right Upper Arm, Position: Sitting, Cuff Size: Medium Adult)   Pulse 70   Resp 18   Ht 1.778 m (5' 10\")   Wt 86.2 kg (190 lb)   SpO2 99%   BMI 27.26 kg/m²     PHYSICAL EXAMINATION:  VS: per nursing note  General: No acute distress.   Eyes: Sclera anicteric.   ENT: No oral lesions.  Thyroid normal.  Nodes: No adenopathy.   Skin: No spider angiomata.  No jaundice.  No palmar erythema.  Respiratory: Lungs clear to auscultation.   Cardiovascular: Regular heart rate.  Positive murmurs.  No JVD.  Abdomen: Soft non-tender, liver size normal to percussion/palpation.  Spleen not palpable. No obvious ascites.  Extremities: No edema.  No muscle wasting.  No gross arthritic changes.  Neurologic: Alert and oriented.  Cranial nerves grossly intact.  No asterixis.    LABORATORY:   Latest Ref Rng 3/7/2023 9/19/2023 3/21/2024   ZENAIDA - Routine Labs       WBC 4.6 - 13.2 K/uL 3.3 (L)  3.4 (L)  3.0 (L)    ANC 1.8 - 8.0 K/UL  2.4  2.2    ANC 1.8 - 8.0 K/UL 2.4      HGB 13.0 - 16.0 g/dL 12.7 (L)  13.5  13.4     - 420 K/uL 75 (L)  68 (L)  70 (L)    INR 0.9 - 1.1  1.1  1.2 (H)  1.1    AST 10 - 38 U/L 25  22  30    ALT 16 - 61 U/L 27  27  29    Alk Phos 45 - 117 U/L 121 (H)  106  120 (H)    Bili, Total 0.2 - 1.0 MG/DL 0.6  0.7  0.6    Bili, Direct 0.0 - 0.2 MG/DL 0.2  0.2  0.2    Albumin 3.4 - 5.0 g/dL 3.7  3.7  3.8    BUN 7.0 - 18 MG/DL 32 (H)  28 (H)  36 (H)    Creat 0.6 - 1.3 MG/DL 1.60 (H)  1.60 (H)

## 2024-03-25 ENCOUNTER — HOSPITAL ENCOUNTER (OUTPATIENT)
Facility: HOSPITAL | Age: 69
Discharge: HOME | End: 2024-03-28
Payer: MEDICARE

## 2024-03-25 DIAGNOSIS — K74.60 CIRRHOSIS OF LIVER WITHOUT ASCITES, UNSPECIFIED HEPATIC CIRRHOSIS TYPE (HCC): ICD-10-CM

## 2024-03-25 PROCEDURE — 76705 ECHO EXAM OF ABDOMEN: CPT

## 2024-03-26 LAB
AFP L3 MFR SERPL: NORMAL % (ref 0–9.9)
AFP SERPL-MCNC: 3.7 NG/ML (ref 0–8.4)

## 2024-09-26 ENCOUNTER — PATIENT MESSAGE (OUTPATIENT)
Age: 69
End: 2024-09-26

## 2024-09-27 ENCOUNTER — TELEPHONE (OUTPATIENT)
Age: 69
End: 2024-09-27

## 2024-09-27 DIAGNOSIS — K74.60 CIRRHOSIS OF LIVER WITHOUT ASCITES, UNSPECIFIED HEPATIC CIRRHOSIS TYPE (HCC): Primary | ICD-10-CM

## 2024-09-27 NOTE — TELEPHONE ENCOUNTER
Rcvd call from patient stating he would like US faxed to [facility] in [facility]#[phone]    Printed and faxed as requested.

## 2024-10-02 ENCOUNTER — HOSPITAL ENCOUNTER (OUTPATIENT)
Facility: HOSPITAL | Age: 69
Discharge: HOME | End: 2024-10-05
Payer: MEDICARE

## 2024-10-02 DIAGNOSIS — K74.60 CIRRHOSIS OF LIVER WITHOUT ASCITES, UNSPECIFIED HEPATIC CIRRHOSIS TYPE (HCC): ICD-10-CM

## 2024-10-02 PROCEDURE — 76705 ECHO EXAM OF ABDOMEN: CPT

## 2024-10-09 ENCOUNTER — TELEPHONE (OUTPATIENT)
Age: 69
End: 2024-10-09

## 2024-10-09 ENCOUNTER — HOSPITAL ENCOUNTER (OUTPATIENT)
Facility: HOSPITAL | Age: 69
Setting detail: SPECIMEN
Discharge: HOME | End: 2024-10-12
Payer: MEDICARE

## 2024-10-09 ENCOUNTER — OFFICE VISIT (OUTPATIENT)
Age: 69
End: 2024-10-09
Payer: MEDICARE

## 2024-10-09 VITALS
WEIGHT: 180 LBS | TEMPERATURE: 97.3 F | OXYGEN SATURATION: 98 % | HEART RATE: 50 BPM | HEIGHT: 70 IN | BODY MASS INDEX: 25.77 KG/M2 | DIASTOLIC BLOOD PRESSURE: 75 MMHG | SYSTOLIC BLOOD PRESSURE: 145 MMHG

## 2024-10-09 DIAGNOSIS — K74.60 CIRRHOSIS OF LIVER WITHOUT ASCITES, UNSPECIFIED HEPATIC CIRRHOSIS TYPE (HCC): ICD-10-CM

## 2024-10-09 DIAGNOSIS — K74.60 CIRRHOSIS OF LIVER WITHOUT ASCITES, UNSPECIFIED HEPATIC CIRRHOSIS TYPE (HCC): Primary | ICD-10-CM

## 2024-10-09 LAB
ALBUMIN SERPL-MCNC: 3.8 G/DL (ref 3.4–5)
ALBUMIN/GLOB SERPL: 1 (ref 0.8–1.7)
ALP SERPL-CCNC: 105 U/L (ref 45–117)
ALT SERPL-CCNC: 30 U/L (ref 16–61)
ANION GAP SERPL CALC-SCNC: 4 MMOL/L (ref 3–18)
AST SERPL-CCNC: 23 U/L (ref 10–38)
BASOPHILS # BLD: 0 K/UL (ref 0–0.1)
BASOPHILS NFR BLD: 1 % (ref 0–2)
BILIRUB DIRECT SERPL-MCNC: 0.2 MG/DL (ref 0–0.2)
BILIRUB SERPL-MCNC: 0.7 MG/DL (ref 0.2–1)
BUN SERPL-MCNC: 36 MG/DL (ref 7–18)
BUN/CREAT SERPL: 20 (ref 12–20)
CALCIUM SERPL-MCNC: 9.3 MG/DL (ref 8.5–10.1)
CHLORIDE SERPL-SCNC: 108 MMOL/L (ref 100–111)
CO2 SERPL-SCNC: 28 MMOL/L (ref 21–32)
CREAT SERPL-MCNC: 1.76 MG/DL (ref 0.6–1.3)
DIFFERENTIAL METHOD BLD: ABNORMAL
EOSINOPHIL # BLD: 0.2 K/UL (ref 0–0.4)
EOSINOPHIL NFR BLD: 5 % (ref 0–5)
ERYTHROCYTE [DISTWIDTH] IN BLOOD BY AUTOMATED COUNT: 13.6 % (ref 11.6–14.5)
GLOBULIN SER CALC-MCNC: 3.7 G/DL (ref 2–4)
GLUCOSE SERPL-MCNC: 122 MG/DL (ref 74–99)
HCT VFR BLD AUTO: 43.1 % (ref 36–48)
HGB BLD-MCNC: 13.9 G/DL (ref 13–16)
IMM GRANULOCYTES # BLD AUTO: 0 K/UL (ref 0–0.04)
IMM GRANULOCYTES NFR BLD AUTO: 1 % (ref 0–0.5)
INR PPP: 1.2 (ref 0.9–1.1)
LYMPHOCYTES # BLD: 0.4 K/UL (ref 0.9–3.6)
LYMPHOCYTES NFR BLD: 12 % (ref 21–52)
MCH RBC QN AUTO: 27.8 PG (ref 24–34)
MCHC RBC AUTO-ENTMCNC: 32.3 G/DL (ref 31–37)
MCV RBC AUTO: 86.2 FL (ref 78–100)
MONOCYTES # BLD: 0.3 K/UL (ref 0.05–1.2)
MONOCYTES NFR BLD: 8 % (ref 3–10)
NEUTS SEG # BLD: 2.7 K/UL (ref 1.8–8)
NEUTS SEG NFR BLD: 74 % (ref 40–73)
NRBC # BLD: 0 K/UL (ref 0–0.01)
NRBC BLD-RTO: 0 PER 100 WBC
PLATELET # BLD AUTO: 70 K/UL (ref 135–420)
PMV BLD AUTO: 12.6 FL (ref 9.2–11.8)
POTASSIUM SERPL-SCNC: 4.8 MMOL/L (ref 3.5–5.5)
PROT SERPL-MCNC: 7.5 G/DL (ref 6.4–8.2)
PROTHROMBIN TIME: 15.7 SEC (ref 11.9–14.9)
RBC # BLD AUTO: 5 M/UL (ref 4.35–5.65)
SODIUM SERPL-SCNC: 140 MMOL/L (ref 136–145)
WBC # BLD AUTO: 3.6 K/UL (ref 4.6–13.2)

## 2024-10-09 PROCEDURE — 85025 COMPLETE CBC W/AUTO DIFF WBC: CPT

## 2024-10-09 PROCEDURE — 91200 LIVER ELASTOGRAPHY: CPT | Performed by: NURSE PRACTITIONER

## 2024-10-09 PROCEDURE — 1123F ACP DISCUSS/DSCN MKR DOCD: CPT | Performed by: NURSE PRACTITIONER

## 2024-10-09 PROCEDURE — 3017F COLORECTAL CA SCREEN DOC REV: CPT | Performed by: NURSE PRACTITIONER

## 2024-10-09 PROCEDURE — 80076 HEPATIC FUNCTION PANEL: CPT

## 2024-10-09 PROCEDURE — G8484 FLU IMMUNIZE NO ADMIN: HCPCS | Performed by: NURSE PRACTITIONER

## 2024-10-09 PROCEDURE — 36415 COLL VENOUS BLD VENIPUNCTURE: CPT

## 2024-10-09 PROCEDURE — 82107 ALPHA-FETOPROTEIN L3: CPT

## 2024-10-09 PROCEDURE — G8427 DOCREV CUR MEDS BY ELIG CLIN: HCPCS | Performed by: NURSE PRACTITIONER

## 2024-10-09 PROCEDURE — G8417 CALC BMI ABV UP PARAM F/U: HCPCS | Performed by: NURSE PRACTITIONER

## 2024-10-09 PROCEDURE — 80048 BASIC METABOLIC PNL TOTAL CA: CPT

## 2024-10-09 PROCEDURE — 3078F DIAST BP <80 MM HG: CPT | Performed by: NURSE PRACTITIONER

## 2024-10-09 PROCEDURE — 1036F TOBACCO NON-USER: CPT | Performed by: NURSE PRACTITIONER

## 2024-10-09 PROCEDURE — 85610 PROTHROMBIN TIME: CPT

## 2024-10-09 PROCEDURE — 99214 OFFICE O/P EST MOD 30 MIN: CPT | Performed by: NURSE PRACTITIONER

## 2024-10-09 PROCEDURE — 3077F SYST BP >= 140 MM HG: CPT | Performed by: NURSE PRACTITIONER

## 2024-10-09 NOTE — PROGRESS NOTES
[facility]      Paulie ERIC MD Michael, FACP, FACG, FAASLD      Rajwinder Barbosa, PA-C    Cailin Dent, [facility]   Saba Moody, [facility]   Isamar Bridger, [facility]  Janes Dia, [facility]   Gaby Baird, [facility]   Kamini Kramer, [facility]   at [facility]   [address], [address]   [place]  [address] [phone]   FAX: [phone]  [facility]   [address], [address]   [place]  [address] [phone]   FAX: [phone]       Patient Care Team:  Charles Bianchi MD as PCP - General (Family Medicine)  Jordon Mtz MD (Internal Medicine Physician)  Everett Caballero MD (Gastroenterology)  Romain Mtz MD (Nephrology)      Patient Active Problem List   Diagnosis    Thrombocytopenia, secondary    Hepatitis C virus infection cured after antiviral drug therapy    GERD (gastroesophageal reflux disease)    CKD (chronic kidney disease)    H/O tobacco use, presenting hazards to health    H/O alcohol abuse    CAD (coronary artery disease), native coronary artery    Esophageal varices (HCC)    Type 2 diabetes mellitus with nephropathy (HCC)    Ascites    Hyperlipidemia    Cirrhosis (HCC)    Hypertension         Saad Hirsch returns to the [facility] for monitoring of cirrhosis from chronic HCV.  The HCV has been treated and cured.      The active problem list, all pertinent past medical history, medications, liver histology, endoscopic studies, radiologic findings and laboratory findings related to the liver disorder were reviewed with the patient.      Ascites resolved.    The patient has not developed hepatic encephalopathy.    The patient has esophageal varices without bleeding.      The patient has begun, but has not completed liver transplant evaluation testing.  The

## 2024-10-09 NOTE — TELEPHONE ENCOUNTER
----- Message from MANDO Johnson CNP sent at 10/8/2024  4:59 PM EDT -----  Please let him know that his ultrasound does not demonstrate any hepatic masses.  Imaging is consistent with cirrhosis.  Thank you.

## 2024-10-10 LAB
AFP L3 MFR SERPL: NORMAL % (ref 0–9.9)
AFP SERPL-MCNC: 3.3 NG/ML (ref 0–8.4)

## 2025-04-09 ENCOUNTER — OFFICE VISIT (OUTPATIENT)
Age: 70
End: 2025-04-09
Payer: MEDICARE

## 2025-04-09 ENCOUNTER — PREP FOR PROCEDURE (OUTPATIENT)
Age: 70
End: 2025-04-09

## 2025-04-09 ENCOUNTER — HOSPITAL ENCOUNTER (OUTPATIENT)
Facility: HOSPITAL | Age: 70
Setting detail: SPECIMEN
Discharge: HOME | End: 2025-04-12
Payer: MEDICARE

## 2025-04-09 VITALS
SYSTOLIC BLOOD PRESSURE: 133 MMHG | TEMPERATURE: 98 F | WEIGHT: 191.4 LBS | HEIGHT: 70 IN | HEART RATE: 73 BPM | OXYGEN SATURATION: 98 % | BODY MASS INDEX: 27.4 KG/M2 | DIASTOLIC BLOOD PRESSURE: 80 MMHG

## 2025-04-09 DIAGNOSIS — K74.60 CIRRHOSIS OF LIVER WITHOUT ASCITES, UNSPECIFIED HEPATIC CIRRHOSIS TYPE (HCC): ICD-10-CM

## 2025-04-09 DIAGNOSIS — K74.60 CIRRHOSIS OF LIVER WITHOUT ASCITES, UNSPECIFIED HEPATIC CIRRHOSIS TYPE (HCC): Primary | ICD-10-CM

## 2025-04-09 LAB
ALBUMIN SERPL-MCNC: 3.5 G/DL (ref 3.4–5)
ALBUMIN/GLOB SERPL: 1.1 (ref 0.8–1.7)
ALP SERPL-CCNC: 117 U/L (ref 45–117)
ALT SERPL-CCNC: 34 U/L (ref 16–61)
ANION GAP SERPL CALC-SCNC: 4 MMOL/L (ref 3–18)
AST SERPL-CCNC: 27 U/L (ref 10–38)
BASOPHILS # BLD: 0.02 K/UL (ref 0–0.1)
BASOPHILS NFR BLD: 0.4 % (ref 0–2)
BILIRUB DIRECT SERPL-MCNC: 0.2 MG/DL (ref 0–0.2)
BILIRUB SERPL-MCNC: 0.7 MG/DL (ref 0.2–1)
BUN SERPL-MCNC: 29 MG/DL (ref 7–18)
BUN/CREAT SERPL: 18 (ref 12–20)
CALCIUM SERPL-MCNC: 8.8 MG/DL (ref 8.5–10.1)
CHLORIDE SERPL-SCNC: 109 MMOL/L (ref 100–111)
CO2 SERPL-SCNC: 28 MMOL/L (ref 21–32)
CREAT SERPL-MCNC: 1.58 MG/DL (ref 0.6–1.3)
DIFFERENTIAL METHOD BLD: ABNORMAL
EOSINOPHIL # BLD: 0.19 K/UL (ref 0–0.4)
EOSINOPHIL NFR BLD: 3.9 % (ref 0–5)
ERYTHROCYTE [DISTWIDTH] IN BLOOD BY AUTOMATED COUNT: 13.7 % (ref 11.6–14.5)
GLOBULIN SER CALC-MCNC: 3.2 G/DL (ref 2–4)
GLUCOSE SERPL-MCNC: 91 MG/DL (ref 74–99)
HCT VFR BLD AUTO: 43.7 % (ref 36–48)
HGB BLD-MCNC: 13.7 G/DL (ref 13–16)
IMM GRANULOCYTES # BLD AUTO: 0.04 K/UL (ref 0–0.04)
IMM GRANULOCYTES NFR BLD AUTO: 0.8 % (ref 0–0.5)
INR PPP: 1.2 (ref 0.9–1.1)
LYMPHOCYTES # BLD: 0.61 K/UL (ref 0.9–3.3)
LYMPHOCYTES NFR BLD: 12.5 % (ref 21–52)
MCH RBC QN AUTO: 27.4 PG (ref 24–34)
MCHC RBC AUTO-ENTMCNC: 31.4 G/DL (ref 31–37)
MCV RBC AUTO: 87.4 FL (ref 78–100)
MONOCYTES # BLD: 0.34 K/UL (ref 0.05–1.2)
MONOCYTES NFR BLD: 7 % (ref 3–10)
NEUTS SEG # BLD: 3.67 K/UL (ref 1.8–8)
NEUTS SEG NFR BLD: 75.4 % (ref 40–73)
NRBC # BLD: 0 K/UL (ref 0–0.01)
NRBC BLD-RTO: 0 PER 100 WBC
PLATELET # BLD AUTO: 104 K/UL (ref 135–420)
PMV BLD AUTO: 12.8 FL (ref 9.2–11.8)
POTASSIUM SERPL-SCNC: 4.7 MMOL/L (ref 3.5–5.5)
PROT SERPL-MCNC: 6.7 G/DL (ref 6.4–8.2)
PROTHROMBIN TIME: 15.3 SEC (ref 11.9–14.9)
RBC # BLD AUTO: 5 M/UL (ref 4.35–5.65)
SODIUM SERPL-SCNC: 141 MMOL/L (ref 136–145)
WBC # BLD AUTO: 4.9 K/UL (ref 4.6–13.2)

## 2025-04-09 PROCEDURE — 3079F DIAST BP 80-89 MM HG: CPT | Performed by: NURSE PRACTITIONER

## 2025-04-09 PROCEDURE — G8427 DOCREV CUR MEDS BY ELIG CLIN: HCPCS | Performed by: NURSE PRACTITIONER

## 2025-04-09 PROCEDURE — 3075F SYST BP GE 130 - 139MM HG: CPT | Performed by: NURSE PRACTITIONER

## 2025-04-09 PROCEDURE — 36415 COLL VENOUS BLD VENIPUNCTURE: CPT

## 2025-04-09 PROCEDURE — 99214 OFFICE O/P EST MOD 30 MIN: CPT | Performed by: NURSE PRACTITIONER

## 2025-04-09 PROCEDURE — 1123F ACP DISCUSS/DSCN MKR DOCD: CPT | Performed by: NURSE PRACTITIONER

## 2025-04-09 PROCEDURE — 1036F TOBACCO NON-USER: CPT | Performed by: NURSE PRACTITIONER

## 2025-04-09 PROCEDURE — 85610 PROTHROMBIN TIME: CPT

## 2025-04-09 PROCEDURE — 1160F RVW MEDS BY RX/DR IN RCRD: CPT | Performed by: NURSE PRACTITIONER

## 2025-04-09 PROCEDURE — G8417 CALC BMI ABV UP PARAM F/U: HCPCS | Performed by: NURSE PRACTITIONER

## 2025-04-09 PROCEDURE — 1126F AMNT PAIN NOTED NONE PRSNT: CPT | Performed by: NURSE PRACTITIONER

## 2025-04-09 PROCEDURE — 3017F COLORECTAL CA SCREEN DOC REV: CPT | Performed by: NURSE PRACTITIONER

## 2025-04-09 PROCEDURE — 82107 ALPHA-FETOPROTEIN L3: CPT

## 2025-04-09 PROCEDURE — 80048 BASIC METABOLIC PNL TOTAL CA: CPT

## 2025-04-09 PROCEDURE — 1159F MED LIST DOCD IN RCRD: CPT | Performed by: NURSE PRACTITIONER

## 2025-04-09 PROCEDURE — 85025 COMPLETE CBC W/AUTO DIFF WBC: CPT

## 2025-04-09 PROCEDURE — 80076 HEPATIC FUNCTION PANEL: CPT

## 2025-04-09 RX ORDER — AZITHROMYCIN 250 MG/1
TABLET, FILM COATED ORAL
COMMUNITY
Start: 2025-03-26 | End: 2025-04-09 | Stop reason: ALTCHOICE

## 2025-04-09 RX ORDER — BENZONATATE 200 MG/1
CAPSULE ORAL
COMMUNITY
Start: 2025-03-26 | End: 2025-04-09 | Stop reason: ALTCHOICE

## 2025-04-09 NOTE — PROGRESS NOTES
liver.  Coarse echotexture and nodular liver contour consistent with cirrhosis.  There is a hyperechoic focus within the left lobe which may represent small calcification or hemangioma.  04/2023.  Ultrasound of the liver.  Cirrhotic liver.  There is an echogenic ovoid structure in the right lobe of the liver measuring 0.4 x 0.3 x 0.4 cm.  No distinct solid mass or cystic lesion is detected in the visualized segments of the liver.  09/2023.  Ultrasound of the liver.  Nodular contour. Heterogeneous echotexture with stable 0.4  cm echogenic nodule in the right lobe. Small cyst in the right lobe. No additional mass.  03/2024.  Ultrasound of the liver.  The liver has coarse heterogeneous echotexture. 4 mm echogenic focus in  the posterior left hepatic lobe.  10/2024.  Ultrasound of the liver.  Cirrhosis.  Hepatic cysts.  No distinct solid mass is detected in the visualized segments of the liver.    Follow up  25 minutes total time spent with this patient with more than 50% of this time spent counseling and coordinating care as described above.     Follow-up [facility] in 6 months for continued monitoring.          [facility] [place]  [facility]  [address], [address]   [place] [address] [phone]          
axox3 ,nad ,s/p fall on 10/07/17 - Ankle FX -name with c/o LEFT ankle pain

## 2025-04-11 LAB
AFP L3 MFR SERPL: NORMAL % (ref 0–9.9)
AFP SERPL-MCNC: 2.8 NG/ML (ref 0–8.4)

## 2025-04-14 ENCOUNTER — HOSPITAL ENCOUNTER (OUTPATIENT)
Facility: HOSPITAL | Age: 70
Discharge: HOME | End: 2025-04-17
Payer: MEDICARE

## 2025-04-14 DIAGNOSIS — K74.60 CIRRHOSIS OF LIVER WITHOUT ASCITES, UNSPECIFIED HEPATIC CIRRHOSIS TYPE (HCC): ICD-10-CM

## 2025-04-14 PROCEDURE — 76705 ECHO EXAM OF ABDOMEN: CPT

## 2025-04-21 ENCOUNTER — RESULTS FOLLOW-UP (OUTPATIENT)
Age: 70
End: 2025-04-21

## 2025-04-24 NOTE — TELEPHONE ENCOUNTER
Spoke with pt and gave ultrasound/lab results as written ----- Message from MANDO Johnson CNP sent at 4/21/2025  2:35 PM EDT -----  Please call with ultrasound results.  No hepatic masses.  Consistent with cirrhosis.  Thank you.

## 2025-04-28 ENCOUNTER — HOSPITAL ENCOUNTER (OUTPATIENT)
Facility: HOSPITAL | Age: 70
Setting detail: OUTPATIENT SURGERY
Discharge: HOME | End: 2025-04-28
Attending: INTERNAL MEDICINE | Admitting: INTERNAL MEDICINE
Payer: MEDICARE

## 2025-04-28 ENCOUNTER — ANESTHESIA (OUTPATIENT)
Facility: HOSPITAL | Age: 70
End: 2025-04-28
Payer: MEDICARE

## 2025-04-28 ENCOUNTER — ANESTHESIA EVENT (OUTPATIENT)
Facility: HOSPITAL | Age: 70
End: 2025-04-28
Payer: MEDICARE

## 2025-04-28 VITALS
BODY MASS INDEX: 28.53 KG/M2 | WEIGHT: 192.6 LBS | DIASTOLIC BLOOD PRESSURE: 74 MMHG | HEART RATE: 63 BPM | SYSTOLIC BLOOD PRESSURE: 132 MMHG | TEMPERATURE: 98 F | HEIGHT: 69 IN | OXYGEN SATURATION: 99 % | RESPIRATION RATE: 17 BRPM

## 2025-04-28 LAB — GLUCOSE BLD STRIP.AUTO-MCNC: 131 MG/DL (ref 70–110)

## 2025-04-28 PROCEDURE — 3600007512: Performed by: INTERNAL MEDICINE

## 2025-04-28 PROCEDURE — 2580000003 HC RX 258: Performed by: ANESTHESIOLOGY

## 2025-04-28 PROCEDURE — 7100000010 HC PHASE II RECOVERY - FIRST 15 MIN: Performed by: INTERNAL MEDICINE

## 2025-04-28 PROCEDURE — 3700000001 HC ADD 15 MINUTES (ANESTHESIA): Performed by: INTERNAL MEDICINE

## 2025-04-28 PROCEDURE — 7100000011 HC PHASE II RECOVERY - ADDTL 15 MIN: Performed by: INTERNAL MEDICINE

## 2025-04-28 PROCEDURE — 82962 GLUCOSE BLOOD TEST: CPT

## 2025-04-28 PROCEDURE — 3700000000 HC ANESTHESIA ATTENDED CARE: Performed by: INTERNAL MEDICINE

## 2025-04-28 PROCEDURE — 2709999900 HC NON-CHARGEABLE SUPPLY: Performed by: INTERNAL MEDICINE

## 2025-04-28 PROCEDURE — 6360000002 HC RX W HCPCS: Performed by: ANESTHESIOLOGY

## 2025-04-28 PROCEDURE — 3600007502: Performed by: INTERNAL MEDICINE

## 2025-04-28 PROCEDURE — 43235 EGD DIAGNOSTIC BRUSH WASH: CPT | Performed by: INTERNAL MEDICINE

## 2025-04-28 RX ORDER — METOPROLOL SUCCINATE 100 MG/1
100 TABLET, EXTENDED RELEASE ORAL DAILY
COMMUNITY

## 2025-04-28 RX ORDER — FENTANYL CITRATE 50 UG/ML
25 INJECTION, SOLUTION INTRAMUSCULAR; INTRAVENOUS AS NEEDED
Status: DISCONTINUED | OUTPATIENT
Start: 2025-04-28 | End: 2025-04-28 | Stop reason: HOSPADM

## 2025-04-28 RX ORDER — SODIUM CHLORIDE 9 MG/ML
INJECTION, SOLUTION INTRAVENOUS PRN
Status: DISCONTINUED | OUTPATIENT
Start: 2025-04-28 | End: 2025-04-28 | Stop reason: HOSPADM

## 2025-04-28 RX ORDER — NALOXONE HYDROCHLORIDE 0.4 MG/ML
INJECTION, SOLUTION INTRAMUSCULAR; INTRAVENOUS; SUBCUTANEOUS PRN
Status: DISCONTINUED | OUTPATIENT
Start: 2025-04-28 | End: 2025-04-28 | Stop reason: HOSPADM

## 2025-04-28 RX ORDER — LIDOCAINE HYDROCHLORIDE 20 MG/ML
INJECTION, SOLUTION EPIDURAL; INFILTRATION; INTRACAUDAL; PERINEURAL
Status: DISCONTINUED | OUTPATIENT
Start: 2025-04-28 | End: 2025-04-28 | Stop reason: SDUPTHER

## 2025-04-28 RX ORDER — SODIUM CHLORIDE 0.9 % (FLUSH) 0.9 %
5-40 SYRINGE (ML) INJECTION EVERY 12 HOURS SCHEDULED
Status: CANCELLED | OUTPATIENT
Start: 2025-04-28

## 2025-04-28 RX ORDER — PROPOFOL 10 MG/ML
INJECTION, EMULSION INTRAVENOUS
Status: DISCONTINUED | OUTPATIENT
Start: 2025-04-28 | End: 2025-04-28 | Stop reason: SDUPTHER

## 2025-04-28 RX ORDER — ONDANSETRON 2 MG/ML
INJECTION INTRAMUSCULAR; INTRAVENOUS
Status: DISCONTINUED | OUTPATIENT
Start: 2025-04-28 | End: 2025-04-28 | Stop reason: SDUPTHER

## 2025-04-28 RX ORDER — SODIUM CHLORIDE 0.9 % (FLUSH) 0.9 %
5-40 SYRINGE (ML) INJECTION PRN
Status: CANCELLED | OUTPATIENT
Start: 2025-04-28

## 2025-04-28 RX ORDER — ONDANSETRON 2 MG/ML
2 INJECTION INTRAMUSCULAR; INTRAVENOUS AS NEEDED
Status: DISCONTINUED | OUTPATIENT
Start: 2025-04-28 | End: 2025-04-28 | Stop reason: HOSPADM

## 2025-04-28 RX ORDER — SODIUM CHLORIDE 9 MG/ML
INJECTION, SOLUTION INTRAVENOUS PRN
Status: CANCELLED | OUTPATIENT
Start: 2025-04-28

## 2025-04-28 RX ADMIN — PROPOFOL 20 MG: 10 INJECTION, EMULSION INTRAVENOUS at 11:27

## 2025-04-28 RX ADMIN — PROPOFOL 20 MG: 10 INJECTION, EMULSION INTRAVENOUS at 11:31

## 2025-04-28 RX ADMIN — PROPOFOL 20 MG: 10 INJECTION, EMULSION INTRAVENOUS at 11:29

## 2025-04-28 RX ADMIN — SODIUM CHLORIDE: 0.9 INJECTION, SOLUTION INTRAVENOUS at 11:03

## 2025-04-28 RX ADMIN — ONDANSETRON HYDROCHLORIDE 4 MG: 2 INJECTION INTRAMUSCULAR; INTRAVENOUS at 11:23

## 2025-04-28 RX ADMIN — PROPOFOL 10 MG: 10 INJECTION, EMULSION INTRAVENOUS at 11:30

## 2025-04-28 RX ADMIN — PROPOFOL 80 MG: 10 INJECTION, EMULSION INTRAVENOUS at 11:25

## 2025-04-28 RX ADMIN — PROPOFOL 10 MG: 10 INJECTION, EMULSION INTRAVENOUS at 11:33

## 2025-04-28 RX ADMIN — LIDOCAINE HYDROCHLORIDE 50 MG: 20 INJECTION, SOLUTION EPIDURAL; INFILTRATION; INTRACAUDAL; PERINEURAL at 11:23

## 2025-04-28 RX ADMIN — PROPOFOL 20 MG: 10 INJECTION, EMULSION INTRAVENOUS at 11:26

## 2025-04-28 RX ADMIN — PROPOFOL 20 MG: 10 INJECTION, EMULSION INTRAVENOUS at 11:28

## 2025-04-28 ASSESSMENT — PAIN - FUNCTIONAL ASSESSMENT: PAIN_FUNCTIONAL_ASSESSMENT: 0-10

## 2025-04-28 NOTE — ANESTHESIA PRE PROCEDURE
Department of Anesthesiology  Preprocedure Note       Name:  Saad Hirsch   Age:  69 y.o.  :  1955                                          MRN:  [id number]         Date:  2025      Surgeon: Surgeon(s):  Paulie Rodriguez MD    Procedure: Procedure(s):  ESOPHAGOGASTRODUODENOSCOPY    Medications prior to admission:   Prior to Admission medications    Medication Sig Start Date End Date Taking? Authorizing Provider   FARXIGA 10 MG tablet  23   Provider, MD Carol   omeprazole (PRILOSEC) 20 MG delayed release capsule  12/3/22   Provider, MD Carol   acyclovir (ZOVIRAX) 5 % CREA 5gram tube for 1 application Externally Five times a day prn    Automatic Reconciliation, Ar   alogliptin (NESINA) 25 MG TABS tablet Take 12.5 mg by mouth daily 20   Automatic Reconciliation, Ar   amLODIPine (NORVASC) 10 MG tablet Take 1 tablet by mouth daily 21   Automatic Reconciliation, Ar   atorvastatin (LIPITOR) 20 MG tablet Take 20 mg by mouth daily 22   Automatic Reconciliation, Ar   diclofenac sodium (VOLTAREN) 1 % GEL 4 gm    Automatic Reconciliation, Ar   glipiZIDE (GLUCOTROL XL) 10 MG extended release tablet Take 10 mg by mouth daily    Automatic Reconciliation, Ar   terazosin (HYTRIN) 5 MG capsule Take 5 mg by mouth daily 17   Automatic Reconciliation, Ar   Testosterone 30 MG/ACT SOLN Place onto the skin.    Automatic Reconciliation, Ar       Current medications:    No current facility-administered medications for this encounter.       Allergies:  No Known Allergies    Problem List:    Patient Active Problem List   Diagnosis Code   • Thrombocytopenia, secondary D69.59   • Hepatitis C virus infection cured after antiviral drug therapy Z86.19   • GERD (gastroesophageal reflux disease) K21.9   • CKD (chronic kidney disease) N18.9   • H/O tobacco use, presenting hazards to health Z87.891   • H/O alcohol abuse F10.11   • CAD (coronary artery disease), native coronary artery I25.10

## 2025-04-28 NOTE — DISCHARGE INSTRUCTIONS
phone number listed above to inquire about the results.    ENDOSCOPY FINDINGS:  Your endoscopy demonstrated mild swelling of the stomach.  Will repeat EGD to look for development of varices in 3 years.  Follow-up appointment to be arranged.    DISCHARGE SUMMARY from the Nurse:  The following personal items collected during your admission are returned to you:                     Valuables Given To: Family (Comment)         DISCHARGE SUMMARY from Nurse    PATIENT INSTRUCTIONS:    After general anesthesia or intravenous sedation, for 24 hours or while taking prescription Narcotics:  Limit your activities  Do not drive and operate hazardous machinery  Do not make important personal or business decisions  Do  not drink alcoholic beverages  If you have not urinated within 8 hours after discharge, please contact your surgeon on call.    Report the following to your surgeon:  Excessive pain, swelling, redness or odor of or around the surgical area  Temperature over 100.5  Nausea and vomiting lasting longer than 4 hours or if unable to take medications  Any signs of decreased circulation or nerve impairment to extremity: change in color, persistent  numbness, tingling, coldness or increase pain  Any questions    What to do at Home:  Recommended activity: ambulate in house and no lifting, Driving, or Strenuous exercise UNTIL ADVISED    If you experience any of the following symptoms ABOVE, please follow up with DR. PRETTY.    *  Please give a list of your current medications to your Primary Care Provider.    *  Please update this list whenever your medications are discontinued, doses are      changed, or new medications (including over-the-counter products) are added.    *  Please carry medication information at all times in case of emergency situations.    These are general instructions for a healthy lifestyle:    No smoking/ No tobacco products/ Avoid exposure to second hand smoke  Surgeon General's Warning:  Quitting

## 2025-04-28 NOTE — ANESTHESIA POSTPROCEDURE EVALUATION
Department of Anesthesiology  Postprocedure Note    Patient: Saad Hirsch  MRN: [id number]  YOB: 1955  Date of evaluation: 4/28/2025    Procedure Summary       Date: 04/28/25 Room / Location: [facility] 01 / [facility] ENDOSCOPY    Anesthesia Start: 1115 Anesthesia Stop: 1138    Procedure: ESOPHAGOGASTRODUODENOSCOPY (Upper GI Region) Diagnosis:       Cirrhosis of liver without ascites (HCC)      (Cirrhosis of liver without ascites (HCC) [K74.60])    Surgeons: Paulie Rodriguez MD Responsible Provider: Brant Darby MD    Anesthesia Type: MAC ASA Status: 3            Anesthesia Type: No value filed.    Connor Phase I: Connor Score: 10    Connor Phase II: Connor Score: 10    Anesthesia Post Evaluation    Patient location during evaluation: PACU  Patient participation: complete - patient participated  Level of consciousness: awake and alert  Pain score: 0  Airway patency: patent  Nausea & Vomiting: no nausea and no vomiting  Cardiovascular status: blood pressure returned to baseline  Respiratory status: acceptable  Hydration status: euvolemic  Pain management: adequate    No notable events documented.

## 2025-04-28 NOTE — OP NOTE
[facility]     Paulie Rodriguez MD, FACP, MACG, FAASLD   MD Rajwinder Lockett, IRIS Dent, [facility]   Saba Moody, [facility]  Janes Dia, FNP-C   Gaby Brie, [facility]   [address], [address]   [place]  [address] [phone]   FAX: [phone]  [facility]   [address], [address]   [place]  [address] [phone]   FAX: [phone]         UPPER ENDOSCOPY PROCEDURE NOTE    NAME: Saad Hirsch  :  1955  MRN:  [id number]    INDICATION: Cirrhosis.  Screening for esophageal varices with variceal ligation if  indicated.    : Paulie Rodriguez MD    SURGICAL ASSISTANT:  None    PROSTHETIC DEVISES, TISSUE GRAFTS, ORGAN TRANSPLANTS:  Not applicable     ANESTHESIA/SEDATION: MAC Sedation per anesthesiology          PROCEDURE DESCRIPTION:  [employer] consent was obtained from the patient for the procedure.  All risks and benefits of the procedure explained.     The procedure was performed in the endoscopy suite.  The patient was laying on a stretcher and moved to the left lateral decubitus position prior to administration of sedation.    Sedation was administered by anesthesiology.  See their note for details.      The endoscope was inserted into the mouth and advanced under direct vision to the second portion of the duodenum.  Careful inspection of upper gastrointestinal tract was made as the endoscope was inserted and withdrawn.  Retroflexion of the endoscope to view of the cardia of the stomach was performed.  The findings and interventions are described below.      FINDINGS:  Esophagus:    Normal.      Stomach:   Mild portal hypertensive gastropathy of the body of the stomach  No gastric varices identified.    Duodenum:

## 2025-04-28 NOTE — PERIOP NOTE
WAITING  FOR ANESTHESIOLOGIST TO CLEAR PT- HR BETWEEN HIGH 50'S  - 90'S - ASYMPTOMATIC ON METOPROLOL   NO NEW ORDER-  will proceed with procedure.

## 2025-04-28 NOTE — H&P
[facility]     Paulie Rodriguez MD, FACP, MACG, FAASLD   MD Rajwinder Lockett, IRIS Dent, [facility]   Saba Dixonbob, [facility]  Janes Dia, FNP-C   Gaby Baird, [facility]         [facility]   [address], [address]   [place]  [address] [phone]   FAX: [phone]  [facility]   [address], [address]   [place]  [address] [phone]   FAX: [phone]       PRE-PROCEDURE NOTE - EGD    H and P from last office visit reviewed.    Allergies reviewed.  Out-patient medicaton list reviewed.    Patient Active Problem List   Diagnosis    Thrombocytopenia, secondary    Hepatitis C virus infection cured after antiviral drug therapy    GERD (gastroesophageal reflux disease)    CKD (chronic kidney disease)    H/O tobacco use, presenting hazards to health    H/O alcohol abuse    CAD (coronary artery disease), native coronary artery    Esophageal varices (HCC)    Type 2 diabetes mellitus with nephropathy (HCC)    Ascites    Hyperlipidemia    Cirrhosis (HCC)    Hypertension    Cirrhosis of liver without ascites (HCC)         No Known Allergies    No current facility-administered medications on file prior to encounter.     Current Outpatient Medications on File Prior to Encounter   Medication Sig Dispense Refill    metoprolol succinate (TOPROL XL) 100 MG extended release tablet Take 1 tablet by mouth daily      FARXIGA 10 MG tablet       omeprazole (PRILOSEC) 20 MG delayed release capsule       alogliptin (NESINA) 25 MG TABS tablet Take 12.5 mg by mouth daily      amLODIPine (NORVASC) 10 MG tablet Take 1 tablet by mouth daily      atorvastatin (LIPITOR) 20 MG tablet Take 1 tablet by mouth daily      glipiZIDE (GLUCOTROL XL) 10 MG extended release tablet Take 1 tablet by

## (undated) DEVICE — 1200 GUARD II KIT W/5MM TUBE W/O VAC TUBE: Brand: GUARDIAN

## (undated) DEVICE — CATHETER ENDOSCP L135CM W7.5XL15.7MM DIA2.8MM FLX RFA

## (undated) DEVICE — Device

## (undated) DEVICE — CATH IV AUTOGRD BC BLU 22GA 25 -- INSYTE

## (undated) DEVICE — CANN NASAL O2 CAPNOGRAPHY AD -- FILTERLINE

## (undated) DEVICE — SYRINGE MED 20ML STD CLR PLAS LUERLOCK TIP N CTRL DISP

## (undated) DEVICE — BW-412T DISP COMBO CLEANING BRUSH: Brand: SINGLE USE COMBINATION CLEANING BRUSH

## (undated) DEVICE — MULTIPLE BAND LIGATOR: Brand: SPEEDBAND SUPERVIEW SUPER 7

## (undated) DEVICE — SINGLE USE LIGATING DEVICE: Brand: SINGLE USE LIGATING DEVICE

## (undated) DEVICE — SOLIDIFIER FLUID 3000 CC ABSORB

## (undated) DEVICE — MAJ-1414 SINGLE USE ADPATER BIOPSY VALV: Brand: SINGLE USE ADAPTOR BIOPSY VALVE

## (undated) DEVICE — BAG BELONG PT PERS CLEAR HANDL

## (undated) DEVICE — ENDO CARRY-ON PROCEDURE KIT INCLUDES ENZYMATIC SPONGE, GAUZE, BIOHAZARD LABEL, TRAY, LUBRICANT, DIRTY SCOPE LABEL, WATER LABEL, TRAY, DRAWSTRING PAD, AND DEFENDO 4-PIECE KIT.: Brand: ENDO CARRY-ON PROCEDURE KIT

## (undated) DEVICE — KIT IV STRT W CHLORAPREP PD 1ML

## (undated) DEVICE — SINGLE PORT MANIFOLD: Brand: NEPTUNE 2

## (undated) DEVICE — Z DISCONTINUED NO SUB IDED SET EXTN W/ 4 W STPCOCK M SPIN LOK 36IN

## (undated) DEVICE — CAP CLN SM SFT DISP BARRX

## (undated) DEVICE — SPONGE GZ W4XL4IN COT 12 PLY TYP VII WVN C FLD DSGN

## (undated) DEVICE — SET ADMIN 16ML TBNG L100IN 2 Y INJ SITE IV PIGGY BK DISP

## (undated) DEVICE — KENDALL RADIOLUCENT FOAM MONITORING ELECTRODE RECTANGULAR SHAPE: Brand: KENDALL

## (undated) DEVICE — TRAP SPEC COLL POLYP POLYSTYR --

## (undated) DEVICE — KENDALL RADIOLUCENT FOAM MONITORING ELECTRODE -RECTANGULAR SHAPE: Brand: KENDALL

## (undated) DEVICE — NEEDLE HYPO 18GA L1.5IN PNK S STL HUB POLYPR SHLD REG BVL

## (undated) DEVICE — MOUTHPIECE ENDOSCP 20X27MM --

## (undated) DEVICE — MOUTHPIECE ENDOSCP L CTRL OPN AND SIDE PORTS DISP

## (undated) DEVICE — 360 EXPRESS RFA BALLOON CATHETER: Brand: BARRX

## (undated) DEVICE — BITE BLK ENDOSCP AD 54FR GRN POLYETH ENDOSCP W STRP SLD

## (undated) DEVICE — NEONATAL-ADULT SPO2 SENSOR: Brand: NELLCOR

## (undated) DEVICE — MEDI-VAC NON-CONDUCTIVE SUCTION TUBING: Brand: CARDINAL HEALTH

## (undated) DEVICE — Device: Brand: MEDICAL ACTION INDUSTRIES

## (undated) DEVICE — SET EXTN TBNG L BOR 4 W STPCOCK ST 32IN PRIMING VOL 6ML

## (undated) DEVICE — TUBING SUCT L12FT DIA0.25IN CLR W/ MAXI-GRIP AND M/M CONN